# Patient Record
Sex: MALE | Race: WHITE | Employment: OTHER | URBAN - NONMETROPOLITAN AREA
[De-identification: names, ages, dates, MRNs, and addresses within clinical notes are randomized per-mention and may not be internally consistent; named-entity substitution may affect disease eponyms.]

---

## 2017-02-13 RX ORDER — FLECAINIDE ACETATE 100 MG/1
TABLET ORAL
Qty: 180 TABLET | Refills: 1 | Status: SHIPPED | OUTPATIENT
Start: 2017-02-13 | End: 2017-06-12 | Stop reason: SDUPTHER

## 2017-06-12 ENCOUNTER — OFFICE VISIT (OUTPATIENT)
Dept: CARDIOLOGY | Age: 82
End: 2017-06-12

## 2017-06-12 VITALS
BODY MASS INDEX: 25.2 KG/M2 | DIASTOLIC BLOOD PRESSURE: 74 MMHG | HEART RATE: 61 BPM | HEIGHT: 70 IN | SYSTOLIC BLOOD PRESSURE: 160 MMHG | WEIGHT: 176 LBS

## 2017-06-12 DIAGNOSIS — I48.0 PAROXYSMAL ATRIAL FIBRILLATION (HCC): Primary | ICD-10-CM

## 2017-06-12 DIAGNOSIS — I10 ESSENTIAL HYPERTENSION: ICD-10-CM

## 2017-06-12 PROCEDURE — 99213 OFFICE O/P EST LOW 20 MIN: CPT | Performed by: NUCLEAR MEDICINE

## 2017-06-12 PROCEDURE — 4040F PNEUMOC VAC/ADMIN/RCVD: CPT | Performed by: NUCLEAR MEDICINE

## 2017-06-12 PROCEDURE — G8428 CUR MEDS NOT DOCUMENT: HCPCS | Performed by: NUCLEAR MEDICINE

## 2017-06-12 PROCEDURE — G8598 ASA/ANTIPLAT THER USED: HCPCS | Performed by: NUCLEAR MEDICINE

## 2017-06-12 PROCEDURE — G8419 CALC BMI OUT NRM PARAM NOF/U: HCPCS | Performed by: NUCLEAR MEDICINE

## 2017-06-12 PROCEDURE — 1123F ACP DISCUSS/DSCN MKR DOCD: CPT | Performed by: NUCLEAR MEDICINE

## 2017-06-12 PROCEDURE — 93000 ELECTROCARDIOGRAM COMPLETE: CPT | Performed by: NUCLEAR MEDICINE

## 2017-06-12 PROCEDURE — 1036F TOBACCO NON-USER: CPT | Performed by: NUCLEAR MEDICINE

## 2017-06-12 RX ORDER — LEVOTHYROXINE SODIUM 0.03 MG/1
12.5 TABLET ORAL DAILY
COMMUNITY
End: 2019-08-07 | Stop reason: SDUPTHER

## 2017-06-12 RX ORDER — FLECAINIDE ACETATE 100 MG/1
TABLET ORAL
Qty: 180 TABLET | Refills: 3 | Status: SHIPPED | OUTPATIENT
Start: 2017-06-12 | End: 2018-01-15 | Stop reason: SDUPTHER

## 2017-06-21 ENCOUNTER — OFFICE VISIT (OUTPATIENT)
Age: 82
End: 2017-06-21

## 2017-06-21 VITALS
OXYGEN SATURATION: 96 % | DIASTOLIC BLOOD PRESSURE: 80 MMHG | BODY MASS INDEX: 25.04 KG/M2 | HEART RATE: 51 BPM | SYSTOLIC BLOOD PRESSURE: 140 MMHG | WEIGHT: 174.9 LBS | TEMPERATURE: 97.7 F | RESPIRATION RATE: 16 BRPM | HEIGHT: 70 IN

## 2017-06-21 DIAGNOSIS — L72.3 SEBACEOUS CYST: Primary | ICD-10-CM

## 2017-06-21 PROCEDURE — 99999 PR OFFICE/OUTPT VISIT,PROCEDURE ONLY: CPT | Performed by: SURGERY

## 2017-06-21 PROCEDURE — 1036F TOBACCO NON-USER: CPT | Performed by: SURGERY

## 2017-06-21 PROCEDURE — 12051 INTMD RPR FACE/MM 2.5 CM/<: CPT | Performed by: SURGERY

## 2017-06-21 PROCEDURE — 11441 EXC FACE-MM B9+MARG 0.6-1 CM: CPT | Performed by: SURGERY

## 2017-06-21 ASSESSMENT — ENCOUNTER SYMPTOMS
FACIAL SWELLING: 0
SHORTNESS OF BREATH: 0
DIARRHEA: 0
EYE PAIN: 0
EYE REDNESS: 0
SINUS PRESSURE: 0
SORE THROAT: 0
COLOR CHANGE: 0
APNEA: 0
CONSTIPATION: 0
VOMITING: 0
CHEST TIGHTNESS: 0
CHOKING: 0
COUGH: 0
ABDOMINAL DISTENTION: 0
ABDOMINAL PAIN: 0
WHEEZING: 0
RHINORRHEA: 0
EYE ITCHING: 0
BACK PAIN: 0
STRIDOR: 0
EYE DISCHARGE: 0
PHOTOPHOBIA: 0
RECTAL PAIN: 0
NAUSEA: 0
BLOOD IN STOOL: 0
TROUBLE SWALLOWING: 0
ANAL BLEEDING: 0
VOICE CHANGE: 0

## 2017-08-30 ENCOUNTER — HOSPITAL ENCOUNTER (OUTPATIENT)
Age: 82
Discharge: HOME | End: 2017-08-30
Payer: MEDICARE

## 2017-08-30 ENCOUNTER — HOSPITAL ENCOUNTER (OUTPATIENT)
Dept: GENERAL RADIOLOGY | Age: 82
Discharge: HOME | End: 2017-08-30
Payer: MEDICARE

## 2017-08-30 DIAGNOSIS — R05.9 COUGH: ICD-10-CM

## 2017-08-30 PROCEDURE — 71020 XR CHEST STANDARD TWO VW: CPT

## 2017-09-01 ENCOUNTER — HOSPITAL ENCOUNTER (OUTPATIENT)
Age: 82
Discharge: HOME | End: 2017-09-01
Payer: MEDICARE

## 2017-09-01 ENCOUNTER — HOSPITAL ENCOUNTER (OUTPATIENT)
Dept: CT IMAGING | Age: 82
Discharge: HOME | End: 2017-09-01
Payer: MEDICARE

## 2017-09-01 DIAGNOSIS — R91.8 LUNG MASS: ICD-10-CM

## 2017-09-01 LAB
CREATININE, WHOLE BLOOD: 1 MG/DL (ref 0.5–1.2)
ESTIMATED GFR, PCACC: 76 ML/MIN/1.73M2

## 2017-09-01 PROCEDURE — 71260 CT THORAX DX C+: CPT

## 2017-09-01 PROCEDURE — 6360000004 HC RX CONTRAST MEDICATION: Performed by: FAMILY MEDICINE

## 2017-09-01 PROCEDURE — 82565 ASSAY OF CREATININE: CPT

## 2017-09-01 RX ADMIN — IOPAMIDOL 100 ML: 755 INJECTION, SOLUTION INTRAVENOUS at 08:30

## 2017-09-09 ENCOUNTER — APPOINTMENT (OUTPATIENT)
Dept: GENERAL RADIOLOGY | Age: 82
DRG: 305 | End: 2017-09-09
Payer: MEDICARE

## 2017-09-09 ENCOUNTER — HOSPITAL ENCOUNTER (INPATIENT)
Age: 82
LOS: 1 days | Discharge: HOME | DRG: 305 | End: 2017-09-11
Attending: EMERGENCY MEDICINE | Admitting: INTERNAL MEDICINE
Payer: MEDICARE

## 2017-09-09 DIAGNOSIS — M54.50 ACUTE MIDLINE LOW BACK PAIN WITHOUT SCIATICA: ICD-10-CM

## 2017-09-09 DIAGNOSIS — I16.1 HYPERTENSIVE EMERGENCY: Primary | ICD-10-CM

## 2017-09-09 DIAGNOSIS — Z79.01 ANTICOAGULATED: ICD-10-CM

## 2017-09-09 DIAGNOSIS — R42 DIZZINESS: ICD-10-CM

## 2017-09-09 PROCEDURE — 84484 ASSAY OF TROPONIN QUANT: CPT

## 2017-09-09 PROCEDURE — 80053 COMPREHEN METABOLIC PANEL: CPT

## 2017-09-09 PROCEDURE — 99285 EMERGENCY DEPT VISIT HI MDM: CPT

## 2017-09-09 PROCEDURE — 36415 COLL VENOUS BLD VENIPUNCTURE: CPT

## 2017-09-09 PROCEDURE — 83690 ASSAY OF LIPASE: CPT

## 2017-09-09 PROCEDURE — 83880 ASSAY OF NATRIURETIC PEPTIDE: CPT

## 2017-09-09 PROCEDURE — 85610 PROTHROMBIN TIME: CPT

## 2017-09-09 PROCEDURE — 93005 ELECTROCARDIOGRAM TRACING: CPT

## 2017-09-09 PROCEDURE — 85025 COMPLETE CBC W/AUTO DIFF WBC: CPT

## 2017-09-09 PROCEDURE — 82248 BILIRUBIN DIRECT: CPT

## 2017-09-09 PROCEDURE — 71010 XR CHEST PORTABLE: CPT

## 2017-09-09 RX ORDER — ONDANSETRON 2 MG/ML
4 INJECTION INTRAMUSCULAR; INTRAVENOUS EVERY 30 MIN PRN
Status: DISCONTINUED | OUTPATIENT
Start: 2017-09-09 | End: 2017-09-11 | Stop reason: HOSPADM

## 2017-09-09 RX ORDER — SODIUM CHLORIDE 9 MG/ML
INJECTION, SOLUTION INTRAVENOUS CONTINUOUS
Status: DISCONTINUED | OUTPATIENT
Start: 2017-09-10 | End: 2017-09-11

## 2017-09-10 ENCOUNTER — APPOINTMENT (OUTPATIENT)
Dept: CT IMAGING | Age: 82
DRG: 305 | End: 2017-09-10
Payer: MEDICARE

## 2017-09-10 LAB
ALBUMIN SERPL-MCNC: 4 G/DL (ref 3.5–5.1)
ALP BLD-CCNC: 78 U/L (ref 38–126)
ALT SERPL-CCNC: 16 U/L (ref 11–66)
ANION GAP SERPL CALCULATED.3IONS-SCNC: 11 MEQ/L (ref 8–16)
AST SERPL-CCNC: 16 U/L (ref 5–40)
BASOPHILS # BLD: 0.3 %
BASOPHILS ABSOLUTE: 0 THOU/MM3 (ref 0–0.1)
BILIRUB SERPL-MCNC: < 0.2 MG/DL (ref 0.3–1.2)
BILIRUBIN DIRECT: < 0.2 MG/DL (ref 0–0.3)
BILIRUBIN URINE: NEGATIVE
BLOOD, URINE: NEGATIVE
BUN BLDV-MCNC: 22 MG/DL (ref 7–22)
CALCIUM SERPL-MCNC: 9.2 MG/DL (ref 8.5–10.5)
CHARACTER, URINE: CLEAR
CHLORIDE BLD-SCNC: 105 MEQ/L (ref 98–111)
CO2: 27 MEQ/L (ref 23–33)
COLOR: YELLOW
CREAT SERPL-MCNC: 0.8 MG/DL (ref 0.4–1.2)
EOSINOPHIL # BLD: 1.2 %
EOSINOPHILS ABSOLUTE: 0.1 THOU/MM3 (ref 0–0.4)
GFR SERPL CREATININE-BSD FRML MDRD: > 90 ML/MIN/1.73M2
GLUCOSE BLD-MCNC: 122 MG/DL (ref 70–108)
GLUCOSE URINE: NEGATIVE MG/DL
HCT VFR BLD CALC: 41.6 % (ref 42–52)
HEMOGLOBIN: 14.1 GM/DL (ref 14–18)
INR BLD: 0.92 (ref 0.85–1.13)
KETONES, URINE: NEGATIVE
LEUKOCYTE ESTERASE, URINE: NEGATIVE
LIPASE: 46.7 U/L (ref 5.6–51.3)
LIPASE: 76.7 U/L (ref 5.6–51.3)
LYMPHOCYTES # BLD: 17.9 %
LYMPHOCYTES ABSOLUTE: 1.9 THOU/MM3 (ref 1–4.8)
MACROCYTES: ABNORMAL
MCH RBC QN AUTO: 33.7 PG (ref 27–31)
MCHC RBC AUTO-ENTMCNC: 33.9 GM/DL (ref 33–37)
MCV RBC AUTO: 99.3 FL (ref 80–94)
MONOCYTES # BLD: 6.7 %
MONOCYTES ABSOLUTE: 0.7 THOU/MM3 (ref 0.4–1.3)
NITRITE, URINE: NEGATIVE
NUCLEATED RED BLOOD CELLS: 0 /100 WBC
OSMOLALITY CALCULATION: 289.6 MOSMOL/KG (ref 275–300)
PDW BLD-RTO: 12.2 % (ref 11.5–14.5)
PH UA: 7
PLATELET # BLD: 222 THOU/MM3 (ref 130–400)
PMV BLD AUTO: 7.9 MCM (ref 7.4–10.4)
POTASSIUM SERPL-SCNC: 3.9 MEQ/L (ref 3.5–5.2)
PRO-BNP: 69.1 PG/ML (ref 0–1800)
PROTEIN UA: NEGATIVE
RBC # BLD: 4.19 MILL/MM3 (ref 4.7–6.1)
RBC # BLD: NORMAL 10*6/UL
SEG NEUTROPHILS: 73.9 %
SEGMENTED NEUTROPHILS ABSOLUTE COUNT: 7.7 THOU/MM3 (ref 1.8–7.7)
SODIUM BLD-SCNC: 143 MEQ/L (ref 135–145)
SPECIFIC GRAVITY, URINE: 1.01 (ref 1–1.03)
TOTAL PROTEIN: 6.3 G/DL (ref 6.1–8)
TROPONIN T: < 0.01 NG/ML
TROPONIN T: < 0.01 NG/ML
UROBILINOGEN, URINE: 0.2 EU/DL
WBC # BLD: 10.4 THOU/MM3 (ref 4.8–10.8)

## 2017-09-10 PROCEDURE — 71275 CT ANGIOGRAPHY CHEST: CPT

## 2017-09-10 PROCEDURE — 2580000003 HC RX 258: Performed by: EMERGENCY MEDICINE

## 2017-09-10 PROCEDURE — 36415 COLL VENOUS BLD VENIPUNCTURE: CPT

## 2017-09-10 PROCEDURE — 6370000000 HC RX 637 (ALT 250 FOR IP): Performed by: INTERNAL MEDICINE

## 2017-09-10 PROCEDURE — 2500000003 HC RX 250 WO HCPCS: Performed by: EMERGENCY MEDICINE

## 2017-09-10 PROCEDURE — 2580000003 HC RX 258: Performed by: INTERNAL MEDICINE

## 2017-09-10 PROCEDURE — 81003 URINALYSIS AUTO W/O SCOPE: CPT

## 2017-09-10 PROCEDURE — 2060000000 HC ICU INTERMEDIATE R&B

## 2017-09-10 PROCEDURE — 6360000004 HC RX CONTRAST MEDICATION: Performed by: EMERGENCY MEDICINE

## 2017-09-10 PROCEDURE — 84484 ASSAY OF TROPONIN QUANT: CPT

## 2017-09-10 PROCEDURE — 74174 CTA ABD&PLVS W/CONTRAST: CPT

## 2017-09-10 PROCEDURE — 83690 ASSAY OF LIPASE: CPT

## 2017-09-10 PROCEDURE — 70450 CT HEAD/BRAIN W/O DYE: CPT

## 2017-09-10 RX ORDER — SODIUM CHLORIDE 0.9 % (FLUSH) 0.9 %
10 SYRINGE (ML) INJECTION PRN
Status: DISCONTINUED | OUTPATIENT
Start: 2017-09-10 | End: 2017-09-11 | Stop reason: HOSPADM

## 2017-09-10 RX ORDER — HYDROCODONE BITARTRATE AND ACETAMINOPHEN 5; 325 MG/1; MG/1
2 TABLET ORAL EVERY 4 HOURS PRN
Status: DISCONTINUED | OUTPATIENT
Start: 2017-09-10 | End: 2017-09-11 | Stop reason: HOSPADM

## 2017-09-10 RX ORDER — SODIUM CHLORIDE 0.9 % (FLUSH) 0.9 %
10 SYRINGE (ML) INJECTION EVERY 12 HOURS SCHEDULED
Status: DISCONTINUED | OUTPATIENT
Start: 2017-09-10 | End: 2017-09-11 | Stop reason: HOSPADM

## 2017-09-10 RX ORDER — FLECAINIDE ACETATE 50 MG/1
100 TABLET ORAL 2 TIMES DAILY
Status: DISCONTINUED | OUTPATIENT
Start: 2017-09-10 | End: 2017-09-11 | Stop reason: HOSPADM

## 2017-09-10 RX ORDER — ASPIRIN 81 MG/1
81 TABLET ORAL DAILY
Status: DISCONTINUED | OUTPATIENT
Start: 2017-09-10 | End: 2017-09-11 | Stop reason: HOSPADM

## 2017-09-10 RX ORDER — LEVOTHYROXINE SODIUM 0.03 MG/1
12.5 TABLET ORAL DAILY
Status: DISCONTINUED | OUTPATIENT
Start: 2017-09-10 | End: 2017-09-11 | Stop reason: HOSPADM

## 2017-09-10 RX ORDER — MULTIVITAMIN WITH FOLIC ACID 400 MCG
1 TABLET ORAL DAILY
Status: DISCONTINUED | OUTPATIENT
Start: 2017-09-10 | End: 2017-09-11 | Stop reason: HOSPADM

## 2017-09-10 RX ORDER — ACETAMINOPHEN 325 MG/1
650 TABLET ORAL EVERY 4 HOURS PRN
Status: DISCONTINUED | OUTPATIENT
Start: 2017-09-10 | End: 2017-09-11 | Stop reason: HOSPADM

## 2017-09-10 RX ORDER — HYDROCODONE BITARTRATE AND ACETAMINOPHEN 5; 325 MG/1; MG/1
1 TABLET ORAL EVERY 4 HOURS PRN
Status: DISCONTINUED | OUTPATIENT
Start: 2017-09-10 | End: 2017-09-11 | Stop reason: HOSPADM

## 2017-09-10 RX ADMIN — FLECAINIDE ACETATE 100 MG: 50 TABLET ORAL at 08:44

## 2017-09-10 RX ADMIN — FLECAINIDE ACETATE 100 MG: 50 TABLET ORAL at 17:35

## 2017-09-10 RX ADMIN — ASPIRIN 81 MG: 81 TABLET ORAL at 08:44

## 2017-09-10 RX ADMIN — IOPAMIDOL 85 ML: 755 INJECTION, SOLUTION INTRAVENOUS at 00:41

## 2017-09-10 RX ADMIN — NICARDIPINE HYDROCHLORIDE 5 MG/HR: 2.5 INJECTION INTRAVENOUS at 06:46

## 2017-09-10 RX ADMIN — Medication 10 ML: at 21:31

## 2017-09-10 RX ADMIN — SODIUM CHLORIDE: 900 INJECTION, SOLUTION INTRAVENOUS at 01:08

## 2017-09-10 RX ADMIN — RIVAROXABAN 20 MG: 20 TABLET, FILM COATED ORAL at 08:44

## 2017-09-10 RX ADMIN — LEVOTHYROXINE SODIUM 12.5 MCG: 25 TABLET ORAL at 06:43

## 2017-09-10 RX ADMIN — NICARDIPINE HYDROCHLORIDE 5 MG/HR: 2.5 INJECTION INTRAVENOUS at 01:08

## 2017-09-10 RX ADMIN — THERA TABS 1 TABLET: TAB at 08:44

## 2017-09-10 ASSESSMENT — ENCOUNTER SYMPTOMS
COUGH: 0
WHEEZING: 0
SORE THROAT: 0
BACK PAIN: 1
BLOOD IN STOOL: 0
VOMITING: 0
SHORTNESS OF BREATH: 0
NAUSEA: 0
ABDOMINAL PAIN: 0
DIARRHEA: 0

## 2017-09-10 ASSESSMENT — PAIN SCALES - GENERAL
PAINLEVEL_OUTOF10: 0
PAINLEVEL_OUTOF10: 0

## 2017-09-11 ENCOUNTER — TELEPHONE (OUTPATIENT)
Dept: CARDIOLOGY CLINIC | Age: 82
End: 2017-09-11

## 2017-09-11 ENCOUNTER — APPOINTMENT (OUTPATIENT)
Dept: INTERVENTIONAL RADIOLOGY/VASCULAR | Age: 82
DRG: 305 | End: 2017-09-11
Payer: MEDICARE

## 2017-09-11 VITALS
TEMPERATURE: 98 F | RESPIRATION RATE: 16 BRPM | BODY MASS INDEX: 24.34 KG/M2 | WEIGHT: 170 LBS | DIASTOLIC BLOOD PRESSURE: 66 MMHG | OXYGEN SATURATION: 94 % | HEART RATE: 62 BPM | HEIGHT: 70 IN | SYSTOLIC BLOOD PRESSURE: 143 MMHG

## 2017-09-11 DIAGNOSIS — I48.20 CHRONIC ATRIAL FIBRILLATION (HCC): Primary | ICD-10-CM

## 2017-09-11 DIAGNOSIS — I10 ESSENTIAL HYPERTENSION: ICD-10-CM

## 2017-09-11 DIAGNOSIS — R42 DIZZINESS: ICD-10-CM

## 2017-09-11 LAB
EKG ATRIAL RATE: 267 BPM
EKG P AXIS: 12 DEGREES
EKG P-R INTERVAL: 182 MS
EKG Q-T INTERVAL: 408 MS
EKG QRS DURATION: 98 MS
EKG QTC CALCULATION (BAZETT): 437 MS
EKG R AXIS: -21 DEGREES
EKG T AXIS: 92 DEGREES
EKG VENTRICULAR RATE: 69 BPM
PROSTATE SPECIFIC ANTIGEN: 1.17 NG/ML (ref 0–1)

## 2017-09-11 PROCEDURE — 84153 ASSAY OF PSA TOTAL: CPT

## 2017-09-11 PROCEDURE — 2580000003 HC RX 258: Performed by: INTERNAL MEDICINE

## 2017-09-11 PROCEDURE — 36415 COLL VENOUS BLD VENIPUNCTURE: CPT

## 2017-09-11 PROCEDURE — 6370000000 HC RX 637 (ALT 250 FOR IP): Performed by: INTERNAL MEDICINE

## 2017-09-11 PROCEDURE — A6257 TRANSPARENT FILM <= 16 SQ IN: HCPCS

## 2017-09-11 PROCEDURE — 93880 EXTRACRANIAL BILAT STUDY: CPT

## 2017-09-11 RX ORDER — ACETAMINOPHEN 325 MG/1
650 TABLET ORAL EVERY 4 HOURS PRN
Qty: 120 TABLET | Refills: 3 | Status: SHIPPED | OUTPATIENT
Start: 2017-09-11 | End: 2017-09-12

## 2017-09-11 RX ORDER — AMLODIPINE BESYLATE 2.5 MG/1
2.5 TABLET ORAL DAILY
Status: DISCONTINUED | OUTPATIENT
Start: 2017-09-11 | End: 2017-09-11 | Stop reason: HOSPADM

## 2017-09-11 RX ORDER — AMLODIPINE BESYLATE 2.5 MG/1
2.5 TABLET ORAL DAILY
Qty: 30 TABLET | Refills: 3 | Status: SHIPPED | OUTPATIENT
Start: 2017-09-11 | End: 2018-01-15 | Stop reason: SDUPTHER

## 2017-09-11 RX ADMIN — AMLODIPINE BESYLATE 2.5 MG: 2.5 TABLET ORAL at 13:44

## 2017-09-11 RX ADMIN — ASPIRIN 81 MG: 81 TABLET ORAL at 08:30

## 2017-09-11 RX ADMIN — LEVOTHYROXINE SODIUM 12.5 MCG: 25 TABLET ORAL at 06:49

## 2017-09-11 RX ADMIN — RIVAROXABAN 20 MG: 20 TABLET, FILM COATED ORAL at 08:29

## 2017-09-11 RX ADMIN — Medication 10 ML: at 08:30

## 2017-09-11 RX ADMIN — FLECAINIDE ACETATE 100 MG: 50 TABLET ORAL at 08:29

## 2017-09-11 RX ADMIN — THERA TABS 1 TABLET: TAB at 08:29

## 2017-09-12 ENCOUNTER — INITIAL CONSULT (OUTPATIENT)
Dept: NEUROLOGY | Age: 82
End: 2017-09-12
Payer: MEDICARE

## 2017-09-12 VITALS
HEIGHT: 70 IN | WEIGHT: 174 LBS | DIASTOLIC BLOOD PRESSURE: 82 MMHG | BODY MASS INDEX: 24.91 KG/M2 | HEART RATE: 62 BPM | SYSTOLIC BLOOD PRESSURE: 138 MMHG

## 2017-09-12 VITALS — HEIGHT: 70 IN | BODY MASS INDEX: 24.91 KG/M2 | WEIGHT: 174 LBS

## 2017-09-12 DIAGNOSIS — R42 DIZZINESS: Primary | ICD-10-CM

## 2017-09-12 DIAGNOSIS — R26.89 BALANCE PROBLEM: ICD-10-CM

## 2017-09-12 PROCEDURE — 1111F DSCHRG MED/CURRENT MED MERGE: CPT | Performed by: PSYCHIATRY & NEUROLOGY

## 2017-09-12 PROCEDURE — G8427 DOCREV CUR MEDS BY ELIG CLIN: HCPCS | Performed by: PSYCHIATRY & NEUROLOGY

## 2017-09-12 PROCEDURE — G8420 CALC BMI NORM PARAMETERS: HCPCS | Performed by: PSYCHIATRY & NEUROLOGY

## 2017-09-12 PROCEDURE — 99215 OFFICE O/P EST HI 40 MIN: CPT | Performed by: PSYCHIATRY & NEUROLOGY

## 2017-09-12 PROCEDURE — G8598 ASA/ANTIPLAT THER USED: HCPCS | Performed by: PSYCHIATRY & NEUROLOGY

## 2017-09-12 PROCEDURE — 1123F ACP DISCUSS/DSCN MKR DOCD: CPT | Performed by: PSYCHIATRY & NEUROLOGY

## 2017-09-12 PROCEDURE — 4040F PNEUMOC VAC/ADMIN/RCVD: CPT | Performed by: PSYCHIATRY & NEUROLOGY

## 2017-09-12 PROCEDURE — 1036F TOBACCO NON-USER: CPT | Performed by: PSYCHIATRY & NEUROLOGY

## 2017-09-13 PROBLEM — I71.20 THORACIC AORTIC ANEURYSM WITHOUT RUPTURE: Status: ACTIVE | Noted: 2017-09-13

## 2017-09-13 PROBLEM — R42 DIZZINESS: Status: ACTIVE | Noted: 2017-09-13

## 2017-09-15 ENCOUNTER — HOSPITAL ENCOUNTER (OUTPATIENT)
Dept: INPATIENT UNIT | Age: 82
Discharge: HOME | End: 2017-09-15

## 2017-09-15 ENCOUNTER — HOSPITAL ENCOUNTER (OUTPATIENT)
Dept: INPATIENT UNIT | Age: 82
Discharge: HOME | End: 2017-09-15
Attending: NUCLEAR MEDICINE | Admitting: NUCLEAR MEDICINE
Payer: MEDICARE

## 2017-09-15 VITALS
HEART RATE: 59 BPM | RESPIRATION RATE: 16 BRPM | SYSTOLIC BLOOD PRESSURE: 142 MMHG | OXYGEN SATURATION: 97 % | TEMPERATURE: 97.7 F | DIASTOLIC BLOOD PRESSURE: 64 MMHG

## 2017-09-15 LAB
ABO CHECK: NORMAL
ANION GAP SERPL CALCULATED.3IONS-SCNC: 14 MEQ/L (ref 8–16)
BUN BLDV-MCNC: 13 MG/DL (ref 7–22)
CALCIUM SERPL-MCNC: 9 MG/DL (ref 8.5–10.5)
CHLORIDE BLD-SCNC: 100 MEQ/L (ref 98–111)
CHOLESTEROL, TOTAL: 160 MG/DL (ref 100–199)
CO2: 28 MEQ/L (ref 23–33)
CREAT SERPL-MCNC: 0.8 MG/DL (ref 0.4–1.2)
GEL INDIRECT COOMBS: NORMAL
GFR SERPL CREATININE-BSD FRML MDRD: > 90 ML/MIN/1.73M2
GLUCOSE BLD-MCNC: 97 MG/DL (ref 70–108)
HCT VFR BLD CALC: 42.8 % (ref 42–52)
HDLC SERPL-MCNC: 48 MG/DL
HEMOGLOBIN: 14.4 GM/DL (ref 14–18)
LDL CHOLESTEROL CALCULATED: 87 MG/DL
MCH RBC QN AUTO: 33.5 PG (ref 27–31)
MCHC RBC AUTO-ENTMCNC: 33.6 GM/DL (ref 33–37)
MCV RBC AUTO: 99.7 FL (ref 80–94)
PDW BLD-RTO: 12.5 % (ref 11.5–14.5)
PLATELET # BLD: 207 THOU/MM3 (ref 130–400)
PMV BLD AUTO: 7.9 MCM (ref 7.4–10.4)
POTASSIUM SERPL-SCNC: 4.5 MEQ/L (ref 3.5–5.2)
RBC # BLD: 4.29 MILL/MM3 (ref 4.7–6.1)
RH FACTOR: NORMAL
SODIUM BLD-SCNC: 142 MEQ/L (ref 135–145)
TRIGL SERPL-MCNC: 127 MG/DL (ref 0–199)
WBC # BLD: 6.7 THOU/MM3 (ref 4.8–10.8)

## 2017-09-15 PROCEDURE — 36415 COLL VENOUS BLD VENIPUNCTURE: CPT

## 2017-09-15 PROCEDURE — 86901 BLOOD TYPING SEROLOGIC RH(D): CPT

## 2017-09-15 PROCEDURE — 80061 LIPID PANEL: CPT

## 2017-09-15 PROCEDURE — C1894 INTRO/SHEATH, NON-LASER: HCPCS

## 2017-09-15 PROCEDURE — C1769 GUIDE WIRE: HCPCS

## 2017-09-15 PROCEDURE — A6258 TRANSPARENT FILM >16<=48 IN: HCPCS

## 2017-09-15 PROCEDURE — 2580000003 HC RX 258: Performed by: NUCLEAR MEDICINE

## 2017-09-15 PROCEDURE — 80048 BASIC METABOLIC PNL TOTAL CA: CPT

## 2017-09-15 PROCEDURE — 93458 L HRT ARTERY/VENTRICLE ANGIO: CPT | Performed by: NUCLEAR MEDICINE

## 2017-09-15 PROCEDURE — 2720000010 HC SURG SUPPLY STERILE

## 2017-09-15 PROCEDURE — 2500000003 HC RX 250 WO HCPCS

## 2017-09-15 PROCEDURE — 2780000010 HC IMPLANT OTHER

## 2017-09-15 PROCEDURE — 85027 COMPLETE CBC AUTOMATED: CPT

## 2017-09-15 PROCEDURE — 93005 ELECTROCARDIOGRAM TRACING: CPT

## 2017-09-15 PROCEDURE — 86885 COOMBS TEST INDIRECT QUAL: CPT

## 2017-09-15 PROCEDURE — 6360000002 HC RX W HCPCS

## 2017-09-15 PROCEDURE — 86900 BLOOD TYPING SEROLOGIC ABO: CPT

## 2017-09-15 RX ORDER — ACETAMINOPHEN 325 MG/1
650 TABLET ORAL EVERY 4 HOURS PRN
Status: DISCONTINUED | OUTPATIENT
Start: 2017-09-15 | End: 2017-09-15 | Stop reason: HOSPADM

## 2017-09-15 RX ORDER — SODIUM CHLORIDE 0.9 % (FLUSH) 0.9 %
10 SYRINGE (ML) INJECTION EVERY 12 HOURS SCHEDULED
Status: DISCONTINUED | OUTPATIENT
Start: 2017-09-15 | End: 2017-09-15 | Stop reason: HOSPADM

## 2017-09-15 RX ORDER — ATROPINE SULFATE 0.4 MG/ML
0.5 AMPUL (ML) INJECTION
Status: DISCONTINUED | OUTPATIENT
Start: 2017-09-15 | End: 2017-09-15 | Stop reason: HOSPADM

## 2017-09-15 RX ORDER — SODIUM CHLORIDE 9 MG/ML
INJECTION, SOLUTION INTRAVENOUS CONTINUOUS
Status: DISCONTINUED | OUTPATIENT
Start: 2017-09-15 | End: 2017-09-15 | Stop reason: SDUPTHER

## 2017-09-15 RX ORDER — ATORVASTATIN CALCIUM 10 MG/1
10 TABLET, FILM COATED ORAL DAILY
Qty: 30 TABLET | Refills: 3 | Status: SHIPPED | OUTPATIENT
Start: 2017-09-15 | End: 2018-01-15 | Stop reason: SDUPTHER

## 2017-09-15 RX ORDER — SODIUM CHLORIDE 9 MG/ML
INJECTION, SOLUTION INTRAVENOUS CONTINUOUS
Status: DISCONTINUED | OUTPATIENT
Start: 2017-09-15 | End: 2017-09-15 | Stop reason: HOSPADM

## 2017-09-15 RX ORDER — ONDANSETRON 2 MG/ML
4 INJECTION INTRAMUSCULAR; INTRAVENOUS EVERY 6 HOURS PRN
Status: DISCONTINUED | OUTPATIENT
Start: 2017-09-15 | End: 2017-09-15 | Stop reason: HOSPADM

## 2017-09-15 RX ORDER — SODIUM CHLORIDE 0.9 % (FLUSH) 0.9 %
10 SYRINGE (ML) INJECTION PRN
Status: DISCONTINUED | OUTPATIENT
Start: 2017-09-15 | End: 2017-09-15 | Stop reason: HOSPADM

## 2017-09-15 RX ORDER — SODIUM CHLORIDE 0.9 % (FLUSH) 0.9 %
10 SYRINGE (ML) INJECTION PRN
Status: DISCONTINUED | OUTPATIENT
Start: 2017-09-15 | End: 2017-09-15 | Stop reason: SDUPTHER

## 2017-09-15 RX ORDER — SODIUM CHLORIDE 0.9 % (FLUSH) 0.9 %
10 SYRINGE (ML) INJECTION EVERY 12 HOURS SCHEDULED
Status: DISCONTINUED | OUTPATIENT
Start: 2017-09-15 | End: 2017-09-15 | Stop reason: SDUPTHER

## 2017-09-15 RX ADMIN — SODIUM CHLORIDE: 9 INJECTION, SOLUTION INTRAVENOUS at 11:05

## 2017-09-18 ENCOUNTER — HOSPITAL ENCOUNTER (OUTPATIENT)
Dept: MRI IMAGING | Age: 82
Discharge: HOME | End: 2017-09-18
Payer: MEDICARE

## 2017-09-18 DIAGNOSIS — R26.89 BALANCE PROBLEM: ICD-10-CM

## 2017-09-18 DIAGNOSIS — R42 DIZZINESS: ICD-10-CM

## 2017-09-18 LAB
EKG ATRIAL RATE: 51 BPM
EKG P AXIS: 0 DEGREES
EKG P-R INTERVAL: 232 MS
EKG Q-T INTERVAL: 430 MS
EKG QRS DURATION: 100 MS
EKG QTC CALCULATION (BAZETT): 396 MS
EKG R AXIS: -3 DEGREES
EKG T AXIS: 79 DEGREES
EKG VENTRICULAR RATE: 51 BPM

## 2017-09-18 PROCEDURE — 70544 MR ANGIOGRAPHY HEAD W/O DYE: CPT

## 2017-09-18 PROCEDURE — 70551 MRI BRAIN STEM W/O DYE: CPT

## 2017-09-22 ENCOUNTER — HOSPITAL ENCOUNTER (OUTPATIENT)
Dept: PULMONOLOGY | Age: 82
Discharge: HOME | End: 2017-09-22
Payer: MEDICARE

## 2017-09-22 PROCEDURE — 94729 DIFFUSING CAPACITY: CPT

## 2017-09-22 PROCEDURE — 94726 PLETHYSMOGRAPHY LUNG VOLUMES: CPT

## 2017-09-22 PROCEDURE — 94060 EVALUATION OF WHEEZING: CPT

## 2017-09-27 ENCOUNTER — OFFICE VISIT (OUTPATIENT)
Dept: NEUROLOGY | Age: 82
End: 2017-09-27
Payer: MEDICARE

## 2017-09-27 VITALS
BODY MASS INDEX: 24.9 KG/M2 | DIASTOLIC BLOOD PRESSURE: 80 MMHG | WEIGHT: 173.94 LBS | SYSTOLIC BLOOD PRESSURE: 130 MMHG | HEART RATE: 67 BPM | HEIGHT: 70 IN

## 2017-09-27 DIAGNOSIS — R42 DIZZINESS: Primary | ICD-10-CM

## 2017-09-27 PROCEDURE — 4040F PNEUMOC VAC/ADMIN/RCVD: CPT | Performed by: PSYCHIATRY & NEUROLOGY

## 2017-09-27 PROCEDURE — 1111F DSCHRG MED/CURRENT MED MERGE: CPT | Performed by: PSYCHIATRY & NEUROLOGY

## 2017-09-27 PROCEDURE — 1123F ACP DISCUSS/DSCN MKR DOCD: CPT | Performed by: PSYCHIATRY & NEUROLOGY

## 2017-09-27 PROCEDURE — G8427 DOCREV CUR MEDS BY ELIG CLIN: HCPCS | Performed by: PSYCHIATRY & NEUROLOGY

## 2017-09-27 PROCEDURE — G8598 ASA/ANTIPLAT THER USED: HCPCS | Performed by: PSYCHIATRY & NEUROLOGY

## 2017-09-27 PROCEDURE — 99213 OFFICE O/P EST LOW 20 MIN: CPT | Performed by: PSYCHIATRY & NEUROLOGY

## 2017-09-27 PROCEDURE — G8420 CALC BMI NORM PARAMETERS: HCPCS | Performed by: PSYCHIATRY & NEUROLOGY

## 2017-09-27 PROCEDURE — 1036F TOBACCO NON-USER: CPT | Performed by: PSYCHIATRY & NEUROLOGY

## 2017-09-27 RX ORDER — FLUTICASONE PROPIONATE 110 UG/1
1 AEROSOL, METERED RESPIRATORY (INHALATION) 2 TIMES DAILY
COMMUNITY
End: 2018-07-25 | Stop reason: ALTCHOICE

## 2017-09-27 RX ORDER — MINOCYCLINE HYDROCHLORIDE 100 MG/1
100 CAPSULE ORAL 2 TIMES DAILY
COMMUNITY
End: 2018-07-25 | Stop reason: ALTCHOICE

## 2017-09-28 ENCOUNTER — PROCEDURE VISIT (OUTPATIENT)
Dept: ENT CLINIC | Age: 82
End: 2017-09-28
Payer: MEDICARE

## 2017-09-28 ENCOUNTER — OFFICE VISIT (OUTPATIENT)
Dept: ENT CLINIC | Age: 82
End: 2017-09-28
Payer: MEDICARE

## 2017-09-28 VITALS
SYSTOLIC BLOOD PRESSURE: 112 MMHG | HEART RATE: 60 BPM | BODY MASS INDEX: 24.37 KG/M2 | WEIGHT: 170.2 LBS | HEIGHT: 70 IN | RESPIRATION RATE: 12 BRPM | DIASTOLIC BLOOD PRESSURE: 64 MMHG | TEMPERATURE: 97.7 F

## 2017-09-28 DIAGNOSIS — Z79.01 LONG TERM CURRENT USE OF ANTICOAGULANT THERAPY: ICD-10-CM

## 2017-09-28 DIAGNOSIS — R05.3 CHRONIC COUGH: ICD-10-CM

## 2017-09-28 DIAGNOSIS — H74.8X1 HEMOTYMPANUM, RIGHT: Primary | ICD-10-CM

## 2017-09-28 DIAGNOSIS — R42 LIGHTHEADEDNESS: ICD-10-CM

## 2017-09-28 DIAGNOSIS — H74.8X2 HEMOTYMPANUM, LEFT: Primary | ICD-10-CM

## 2017-09-28 PROCEDURE — G8598 ASA/ANTIPLAT THER USED: HCPCS | Performed by: NURSE PRACTITIONER

## 2017-09-28 PROCEDURE — 99202 OFFICE O/P NEW SF 15 MIN: CPT | Performed by: NURSE PRACTITIONER

## 2017-09-28 PROCEDURE — 1036F TOBACCO NON-USER: CPT | Performed by: NURSE PRACTITIONER

## 2017-09-28 PROCEDURE — G8427 DOCREV CUR MEDS BY ELIG CLIN: HCPCS | Performed by: NURSE PRACTITIONER

## 2017-09-28 PROCEDURE — 4040F PNEUMOC VAC/ADMIN/RCVD: CPT | Performed by: NURSE PRACTITIONER

## 2017-09-28 PROCEDURE — 92504 EAR MICROSCOPY EXAMINATION: CPT | Performed by: OTOLARYNGOLOGY

## 2017-09-28 PROCEDURE — 1123F ACP DISCUSS/DSCN MKR DOCD: CPT | Performed by: NURSE PRACTITIONER

## 2017-09-28 PROCEDURE — G8420 CALC BMI NORM PARAMETERS: HCPCS | Performed by: NURSE PRACTITIONER

## 2017-09-28 PROCEDURE — 1111F DSCHRG MED/CURRENT MED MERGE: CPT | Performed by: NURSE PRACTITIONER

## 2017-09-28 ASSESSMENT — ENCOUNTER SYMPTOMS
STRIDOR: 0
SORE THROAT: 0
TROUBLE SWALLOWING: 0
VOICE CHANGE: 0
EYE PAIN: 0
WHEEZING: 0
VOMITING: 0
COLOR CHANGE: 0
SHORTNESS OF BREATH: 0
RECTAL PAIN: 0
CHEST TIGHTNESS: 0
ABDOMINAL DISTENTION: 0
BACK PAIN: 0
ANAL BLEEDING: 0
APNEA: 0
CONSTIPATION: 0
COUGH: 1
RHINORRHEA: 0
ABDOMINAL PAIN: 0
PHOTOPHOBIA: 0
NAUSEA: 0
DIARRHEA: 0
BLOOD IN STOOL: 0
EYE DISCHARGE: 0
FACIAL SWELLING: 0
SINUS PRESSURE: 0
EYE ITCHING: 0
CHOKING: 0
EYE REDNESS: 0

## 2017-10-03 ENCOUNTER — OFFICE VISIT (OUTPATIENT)
Dept: ENT CLINIC | Age: 82
End: 2017-10-03
Payer: MEDICARE

## 2017-10-03 VITALS
DIASTOLIC BLOOD PRESSURE: 62 MMHG | BODY MASS INDEX: 24.72 KG/M2 | TEMPERATURE: 97.7 F | WEIGHT: 172.3 LBS | RESPIRATION RATE: 16 BRPM | SYSTOLIC BLOOD PRESSURE: 120 MMHG | HEART RATE: 78 BPM

## 2017-10-03 DIAGNOSIS — R05.3 CHRONIC COUGH: Primary | ICD-10-CM

## 2017-10-03 DIAGNOSIS — H91.93 BILATERAL HEARING LOSS, UNSPECIFIED HEARING LOSS TYPE: ICD-10-CM

## 2017-10-03 DIAGNOSIS — R42 LIGHTHEADEDNESS: ICD-10-CM

## 2017-10-03 DIAGNOSIS — Z79.01 LONG TERM CURRENT USE OF ANTICOAGULANT THERAPY: ICD-10-CM

## 2017-10-03 PROCEDURE — 1123F ACP DISCUSS/DSCN MKR DOCD: CPT | Performed by: OTOLARYNGOLOGY

## 2017-10-03 PROCEDURE — 99213 OFFICE O/P EST LOW 20 MIN: CPT | Performed by: OTOLARYNGOLOGY

## 2017-10-03 PROCEDURE — 1036F TOBACCO NON-USER: CPT | Performed by: OTOLARYNGOLOGY

## 2017-10-03 PROCEDURE — G8420 CALC BMI NORM PARAMETERS: HCPCS | Performed by: OTOLARYNGOLOGY

## 2017-10-03 PROCEDURE — G8427 DOCREV CUR MEDS BY ELIG CLIN: HCPCS | Performed by: OTOLARYNGOLOGY

## 2017-10-03 PROCEDURE — 31575 DIAGNOSTIC LARYNGOSCOPY: CPT | Performed by: OTOLARYNGOLOGY

## 2017-10-03 PROCEDURE — 4040F PNEUMOC VAC/ADMIN/RCVD: CPT | Performed by: OTOLARYNGOLOGY

## 2017-10-03 PROCEDURE — 1111F DSCHRG MED/CURRENT MED MERGE: CPT | Performed by: OTOLARYNGOLOGY

## 2017-10-03 PROCEDURE — G8484 FLU IMMUNIZE NO ADMIN: HCPCS | Performed by: OTOLARYNGOLOGY

## 2017-10-03 PROCEDURE — G8598 ASA/ANTIPLAT THER USED: HCPCS | Performed by: OTOLARYNGOLOGY

## 2017-10-03 ASSESSMENT — ENCOUNTER SYMPTOMS
COLOR CHANGE: 0
WHEEZING: 0
RHINORRHEA: 0
CHEST TIGHTNESS: 0
SORE THROAT: 0
NAUSEA: 0
VOICE CHANGE: 0
SHORTNESS OF BREATH: 0
SINUS PRESSURE: 0
STRIDOR: 0
FACIAL SWELLING: 0
CHOKING: 0
VOMITING: 0
COUGH: 0
TROUBLE SWALLOWING: 0
APNEA: 0
ABDOMINAL PAIN: 0
DIARRHEA: 0

## 2017-10-03 NOTE — PATIENT INSTRUCTIONS
Do not fill up stomach for 3 hours before bedtime. Elevate the head of the bed, perhaps with a foam wedge. Do not clear your throat, just cough instead.

## 2017-10-03 NOTE — MR AVS SNAPSHOT
After Visit Summary             Adrian Pabon   10/3/2017 8:45 AM   Office Visit    Description:  Male : 1934   Provider:  Dorothy Uriarte MD   Department:  [facility]              Your Follow-Up and Future Appointments         Below is a list of your follow-up and future appointments. This may not be a complete list as you may have made appointments directly with providers that we are not aware of or your providers may have made some for you. Please call your providers to confirm appointments. It is important to keep your appointments. Please bring your current insurance card, photo ID, co-pay, and all medication bottles to your appointment. If self-pay, payment is expected at the time of service. Your To-Do List     Future Appointments Provider Department Dept Phone    2017 10:00 AM Dorothy Uriarte MD [facility] [phone]    Please arrive 15 minutes prior to appointment, bring photo ID and insurance card. Please arrive 15 minutes prior to appointment, bring photo ID and insurance card. 2018 7:45 AM Jonny Omer MD [facility] [phone]    Please arrive 15 minutes prior to appointment, bring photo ID and insurance card. Please arrive 15 minutes prior to appointment, bring photo ID and insurance card. 2018 9:40 AM Chantelle Bonillaa. [facility] 60 [phone]    Please arrive 15 minutes prior to scheduled appointment time to complete paper work, bring photo ID and insurance cards. Please arrive 15 minutes prior to scheduled appointment time to complete paper work, bring photo ID and insurance cards. 2018 11:00 AM Jonny Omer MD [facility] [phone]    Please arrive 15 minutes prior to appointment, bring photo ID and insurance card. Please arrive 15 minutes prior to appointment, bring photo ID and insurance card.          Information from Your Visit        Department Name Address Phone Fax    [facility] [address] [facility]  [phone]      You Were Seen for:         Comments    Chronic cough   [[id number]]         Vital Signs     Blood Pressure Pulse Temperature Respirations Weight Body Mass Index    120/62 (Site: Right Arm, Position: Sitting) 78 97.7 °F (36.5 °C) (Oral) 16 172 lb 4.8 oz (78.2 kg) 24.72 kg/m2    Smoking Status                   Never Smoker           Instructions    Do not fill up stomach for 3 hours before bedtime. Elevate the head of the bed, perhaps with a foam wedge. Do not clear your throat, just cough instead. Medications and Orders      Your Current Medications Are              fluticasone (FLOVENT HFA) 110 MCG/ACT inhaler Inhale 1 puff into the lungs 2 times daily    mometasone-formoterol (DULERA) 100-5 MCG/ACT inhaler Inhale 2 puffs into the lungs 2 times daily    atorvastatin (LIPITOR) 10 MG tablet Take 1 tablet by mouth daily    amLODIPine (NORVASC) 2.5 MG tablet Take 1 tablet by mouth daily    levothyroxine (SYNTHROID) 25 MCG tablet Take 12.5 mcg by mouth Daily    rivaroxaban (XARELTO) 20 MG TABS tablet Take 1 tablet by mouth daily (with breakfast) Lox [id number]  Exp 5-19    flecainide (TAMBOCOR) 100 MG tablet TAKE ONE TABLET BY MOUTH TWICE A DAY    aspirin 81 MG tablet Take 81 mg by mouth daily.     MULTIPLE VITAMIN PO Take by mouth daily     minocycline (MINOCIN;DYNACIN) 100 MG capsule Take 100 mg by mouth 2 times daily      Allergies           No Known Allergies         Additional Information        Basic Information     Date Of Birth Sex Race Ethnicity Preferred Language Preferred Written Language    1934 Male White Non-/Non  English English      Problem List as of 10/3/2017  Date Reviewed: 9/28/2017                Coronary artery disease of native artery of native heart with stable angina pectoris ([address]    Dizziness    Thoracic aortic aneurysm without rupture ([address]

## 2017-10-12 RX ORDER — AZITHROMYCIN 250 MG/1
TABLET, FILM COATED ORAL
Qty: 2 PACKET | Refills: 0 | Status: SHIPPED | OUTPATIENT
Start: 2017-10-12 | End: 2018-07-25 | Stop reason: ALTCHOICE

## 2018-01-15 ENCOUNTER — OFFICE VISIT (OUTPATIENT)
Dept: CARDIOLOGY CLINIC | Age: 83
End: 2018-01-15
Payer: MEDICARE

## 2018-01-15 VITALS
DIASTOLIC BLOOD PRESSURE: 80 MMHG | BODY MASS INDEX: 25.2 KG/M2 | HEART RATE: 61 BPM | HEIGHT: 70 IN | WEIGHT: 176 LBS | SYSTOLIC BLOOD PRESSURE: 142 MMHG

## 2018-01-15 DIAGNOSIS — I25.10 CORONARY ARTERY DISEASE INVOLVING NATIVE CORONARY ARTERY OF NATIVE HEART WITHOUT ANGINA PECTORIS: ICD-10-CM

## 2018-01-15 DIAGNOSIS — I48.20 CHRONIC ATRIAL FIBRILLATION (HCC): Primary | ICD-10-CM

## 2018-01-15 DIAGNOSIS — I10 ESSENTIAL HYPERTENSION: ICD-10-CM

## 2018-01-15 PROCEDURE — 4040F PNEUMOC VAC/ADMIN/RCVD: CPT | Performed by: NUCLEAR MEDICINE

## 2018-01-15 PROCEDURE — 93000 ELECTROCARDIOGRAM COMPLETE: CPT | Performed by: NUCLEAR MEDICINE

## 2018-01-15 PROCEDURE — 1036F TOBACCO NON-USER: CPT | Performed by: NUCLEAR MEDICINE

## 2018-01-15 PROCEDURE — G8428 CUR MEDS NOT DOCUMENT: HCPCS | Performed by: NUCLEAR MEDICINE

## 2018-01-15 PROCEDURE — G8598 ASA/ANTIPLAT THER USED: HCPCS | Performed by: NUCLEAR MEDICINE

## 2018-01-15 PROCEDURE — G8484 FLU IMMUNIZE NO ADMIN: HCPCS | Performed by: NUCLEAR MEDICINE

## 2018-01-15 PROCEDURE — 99214 OFFICE O/P EST MOD 30 MIN: CPT | Performed by: NUCLEAR MEDICINE

## 2018-01-15 PROCEDURE — G8419 CALC BMI OUT NRM PARAM NOF/U: HCPCS | Performed by: NUCLEAR MEDICINE

## 2018-01-15 PROCEDURE — 1123F ACP DISCUSS/DSCN MKR DOCD: CPT | Performed by: NUCLEAR MEDICINE

## 2018-01-15 RX ORDER — ATORVASTATIN CALCIUM 10 MG/1
10 TABLET, FILM COATED ORAL DAILY
Qty: 90 TABLET | Refills: 3 | Status: SHIPPED | OUTPATIENT
Start: 2018-01-15 | End: 2019-01-12 | Stop reason: SDUPTHER

## 2018-01-15 RX ORDER — FLECAINIDE ACETATE 100 MG/1
TABLET ORAL
Qty: 180 TABLET | Refills: 3 | Status: SHIPPED | OUTPATIENT
Start: 2018-01-15 | End: 2018-12-26 | Stop reason: SDUPTHER

## 2018-01-15 RX ORDER — AMLODIPINE BESYLATE 2.5 MG/1
2.5 TABLET ORAL DAILY
Qty: 90 TABLET | Refills: 3 | Status: SHIPPED | OUTPATIENT
Start: 2018-01-15 | End: 2019-01-12 | Stop reason: SDUPTHER

## 2018-01-15 NOTE — PROGRESS NOTES
[facility]  [facility]  [address]  [address]  [facility] [address]  Dept: [phone]  Dept Fax: [phone]  Loc: [phone]    Visit Date: 1/15/2018    Umang Snowden is a 80 y.o. male who presents today for:  Chief Complaint   Patient presents with    Follow Up After Procedure     cath    Atrial Fibrillation    Hypertension    Coronary Artery Disease     Intermittent A fib   Cath done   diffuse CAD  No chest pain  No changes in breathing  Active   No new issues   Some ear issues  Some bleeding  They want him off of the Xeralto   Some vertigo   HPI:  HPI  Past Medical History:   Diagnosis Date    Bronchitis     CAD (coronary artery disease)     atrial fib     Chronic kidney disease     kidney stones     Hypertension     Stroke (cerebrum) ([address]     Thyroid disease     Trigeminal neuralgia       Past Surgical History:   Procedure Laterality Date    APPENDECTOMY  1951    BRAIN SURGERY  2003    CHOLECYSTECTOMY      COLONOSCOPY      GALLBLADDER SURGERY  2010    HERNIA REPAIR      KIDNEY STONE SURGERY  2003    TURP  2003     Family History   Problem Relation Age of Onset    Heart Disease Mother     Heart Attack Mother     Diabetes Father     Heart Disease Father     High Blood Pressure Father     Cancer Brother     Diabetes Brother     Heart Disease Brother     Diabetes Brother     Stroke Brother      Social History   Substance Use Topics    Smoking status: Never Smoker    Smokeless tobacco: Never Used    Alcohol use No      Current Outpatient Prescriptions   Medication Sig Dispense Refill    atorvastatin (LIPITOR) 10 MG tablet Take 1 tablet by mouth daily 30 tablet 3    amLODIPine (NORVASC) 2.5 MG tablet Take 1 tablet by mouth daily 30 tablet 3    rivaroxaban (XARELTO) 20 MG TABS tablet Take 1 tablet by mouth daily (with breakfast) Lox [id number]  Exp 5-19 91 tablet 0    flecainide (TAMBOCOR) 100 MG tablet TAKE ONE TABLET BY MOUTH TWICE A

## 2018-01-15 NOTE — PROGRESS NOTES
Patient here for follow up to heart cath. Patient complains of occasional lightheadedness and sob on occasion. Patient denies chest pain.

## 2018-04-17 ENCOUNTER — HOSPITAL ENCOUNTER (OUTPATIENT)
Age: 83
Discharge: HOME | End: 2018-04-17
Payer: MEDICARE

## 2018-04-17 ENCOUNTER — HOSPITAL ENCOUNTER (OUTPATIENT)
Dept: CT IMAGING | Age: 83
Discharge: HOME | End: 2018-04-17
Payer: MEDICARE

## 2018-04-17 DIAGNOSIS — R10.84 ABDOMINAL PAIN, GENERALIZED: ICD-10-CM

## 2018-04-17 LAB
CREATININE, WHOLE BLOOD: 1 MG/DL (ref 0.5–1.2)
ESTIMATED GFR, PCACC: 76 ML/MIN/1.73M2

## 2018-04-17 PROCEDURE — 74177 CT ABD & PELVIS W/CONTRAST: CPT

## 2018-04-17 PROCEDURE — 82565 ASSAY OF CREATININE: CPT

## 2018-04-17 PROCEDURE — 6360000004 HC RX CONTRAST MEDICATION: Performed by: FAMILY MEDICINE

## 2018-04-17 RX ADMIN — IOPAMIDOL 100 ML: 755 INJECTION, SOLUTION INTRAVENOUS at 10:30

## 2018-04-17 RX ADMIN — IOHEXOL 50 ML: 240 INJECTION, SOLUTION INTRATHECAL; INTRAVASCULAR; INTRAVENOUS; ORAL at 09:00

## 2018-07-25 ENCOUNTER — OFFICE VISIT (OUTPATIENT)
Dept: CARDIOLOGY CLINIC | Age: 83
End: 2018-07-25
Payer: MEDICARE

## 2018-07-25 ENCOUNTER — OFFICE VISIT (OUTPATIENT)
Dept: SURGERY | Age: 83
End: 2018-07-25
Payer: MEDICARE

## 2018-07-25 VITALS
SYSTOLIC BLOOD PRESSURE: 146 MMHG | WEIGHT: 170 LBS | HEART RATE: 51 BPM | BODY MASS INDEX: 24.34 KG/M2 | DIASTOLIC BLOOD PRESSURE: 60 MMHG | HEIGHT: 70 IN

## 2018-07-25 VITALS
OXYGEN SATURATION: 95 % | TEMPERATURE: 96.6 F | HEART RATE: 60 BPM | HEIGHT: 70 IN | RESPIRATION RATE: 18 BRPM | WEIGHT: 171.1 LBS | DIASTOLIC BLOOD PRESSURE: 60 MMHG | SYSTOLIC BLOOD PRESSURE: 132 MMHG | BODY MASS INDEX: 24.5 KG/M2

## 2018-07-25 DIAGNOSIS — Z01.818 PRE-OP TESTING: ICD-10-CM

## 2018-07-25 DIAGNOSIS — I71.20 THORACIC AORTIC ANEURYSM WITHOUT RUPTURE: ICD-10-CM

## 2018-07-25 DIAGNOSIS — I48.20 CHRONIC ATRIAL FIBRILLATION (HCC): ICD-10-CM

## 2018-07-25 DIAGNOSIS — R42 DIZZINESS: ICD-10-CM

## 2018-07-25 DIAGNOSIS — I25.118 CORONARY ARTERY DISEASE OF NATIVE ARTERY OF NATIVE HEART WITH STABLE ANGINA PECTORIS (HCC): Primary | ICD-10-CM

## 2018-07-25 DIAGNOSIS — K40.90 RIGHT INGUINAL HERNIA: Primary | ICD-10-CM

## 2018-07-25 DIAGNOSIS — I25.118 CORONARY ARTERY DISEASE OF NATIVE ARTERY OF NATIVE HEART WITH STABLE ANGINA PECTORIS (HCC): ICD-10-CM

## 2018-07-25 PROCEDURE — G8427 DOCREV CUR MEDS BY ELIG CLIN: HCPCS | Performed by: SURGERY

## 2018-07-25 PROCEDURE — G8420 CALC BMI NORM PARAMETERS: HCPCS | Performed by: NUCLEAR MEDICINE

## 2018-07-25 PROCEDURE — G8420 CALC BMI NORM PARAMETERS: HCPCS | Performed by: SURGERY

## 2018-07-25 PROCEDURE — G8427 DOCREV CUR MEDS BY ELIG CLIN: HCPCS | Performed by: NUCLEAR MEDICINE

## 2018-07-25 PROCEDURE — 99213 OFFICE O/P EST LOW 20 MIN: CPT | Performed by: SURGERY

## 2018-07-25 PROCEDURE — 1123F ACP DISCUSS/DSCN MKR DOCD: CPT | Performed by: NUCLEAR MEDICINE

## 2018-07-25 PROCEDURE — 1101F PT FALLS ASSESS-DOCD LE1/YR: CPT | Performed by: SURGERY

## 2018-07-25 PROCEDURE — G8598 ASA/ANTIPLAT THER USED: HCPCS | Performed by: NUCLEAR MEDICINE

## 2018-07-25 PROCEDURE — 1036F TOBACCO NON-USER: CPT | Performed by: NUCLEAR MEDICINE

## 2018-07-25 PROCEDURE — 99213 OFFICE O/P EST LOW 20 MIN: CPT | Performed by: NUCLEAR MEDICINE

## 2018-07-25 PROCEDURE — 93000 ELECTROCARDIOGRAM COMPLETE: CPT | Performed by: NUCLEAR MEDICINE

## 2018-07-25 PROCEDURE — 4040F PNEUMOC VAC/ADMIN/RCVD: CPT | Performed by: NUCLEAR MEDICINE

## 2018-07-25 PROCEDURE — 1101F PT FALLS ASSESS-DOCD LE1/YR: CPT | Performed by: NUCLEAR MEDICINE

## 2018-07-25 NOTE — PROGRESS NOTES
mouth daily        No current facility-administered medications for this visit. Allergies   Allergen Reactions    Prednisone      Raised BP     Health Maintenance   Topic Date Due    DTaP/Tdap/Td vaccine (1 - Tdap) 02/05/1953    Shingles Vaccine (1 of 2 - 2 Dose Series) 02/05/1984    Pneumococcal low/med risk (1 of 2 - PCV13) 02/05/1999    Flu vaccine (1) 09/01/2018       Subjective:  Review of Systems  General:   No fever, no chills, No fatigue or weight loss  Pulmonary:    No dyspnea, no wheezing  Cardiac:    Denies recent chest pain,   GI:     No nausea or vomiting, no abdominal pain  Neuro:    No dizziness or light headedness,   Musculoskeletal:  No recent active issues  Extremities:   No edema, good peripheral pulses      Objective:  Physical Exam  BP (!) 146/60   Pulse 51   Ht 5' 10\" (1.778 m)   Wt 170 lb (77.1 kg)   BMI 24.39 kg/m²   General:   Well developed, well nourished  Lungs:   Clear to auscultation  Heart:    Normal S1 S2, Slight murmur. no rubs, no gallops  Abdomen:   Soft, non tender, no organomegalies, positive bowel sounds  Extremities:   No edema, no cyanosis, good peripheral pulses  Neurological:   Awake, alert, oriented. No obvious focal deficits  Musculoskelatal:  No obvious deformities  Assessment:      Diagnosis Orders   1. Coronary artery disease of native artery of native heart with stable angina pectoris ([address]  EKG 12 lead   2. Chronic atrial fibrillation (HCC)  EKG 12 lead   3. Dizziness  EKG 12 lead   4. Thoracic aortic aneurysm without rupture (HCC)  EKG 12 lead   cardiac fair for now   ECG in office was done today. I reviewed the ECG. No acute findings    Plan:  No Follow-up on file. As above  Continue risk factor modification and medical management  Thank you for allowing me to participate in the care of your patient.  Please don't hesitate to contact me regarding any further issues related to the patient care    Orders Placed:  Orders Placed This Encounter   Procedures

## 2018-07-27 LAB
ANION GAP SERPL CALCULATED.3IONS-SCNC: 14 MEQ/L (ref 10–19)
BUN BLDV-MCNC: 17 MG/DL (ref 8–23)
CALCIUM SERPL-MCNC: 9.4 MG/DL (ref 8.5–10.5)
CHLORIDE BLD-SCNC: 105 MEQ/L (ref 95–107)
CO2: 26 MEQ/L (ref 19–31)
CREAT SERPL-MCNC: 0.9 MG/DL (ref 0.8–1.4)
EGFR AFRICAN AMERICAN: 90.6 ML/MIN/1.73 M2
EGFR IF NONAFRICAN AMERICAN: 78.2 ML/MIN/1.73 M2
GLUCOSE: 98 MG/DL (ref 70–99)
HCT VFR BLD CALC: 41.8 % (ref 41.4–51)
HEMOGLOBIN: 14.5 G/DL (ref 13.8–17)
POTASSIUM SERPL-SCNC: 4.9 MEQ/L (ref 3.5–5.4)
SODIUM BLD-SCNC: 145 MEQ/L (ref 135–146)

## 2018-07-30 ASSESSMENT — ENCOUNTER SYMPTOMS
FACIAL SWELLING: 0
BACK PAIN: 0
CHOKING: 0
COLOR CHANGE: 0
SINUS PRESSURE: 0
SORE THROAT: 0
STRIDOR: 0
ALLERGIC/IMMUNOLOGIC NEGATIVE: 1
ABDOMINAL PAIN: 1
DIARRHEA: 0
ANAL BLEEDING: 0
ABDOMINAL DISTENTION: 0
VOMITING: 0
SHORTNESS OF BREATH: 0
COUGH: 0
NAUSEA: 0
RHINORRHEA: 0
PHOTOPHOBIA: 0
EYE ITCHING: 0
TROUBLE SWALLOWING: 0
EYE DISCHARGE: 0
WHEEZING: 0
BLOOD IN STOOL: 0
RECTAL PAIN: 0
CHEST TIGHTNESS: 0
EYE REDNESS: 0
EYE PAIN: 0
APNEA: 0
CONSTIPATION: 0
VOICE CHANGE: 0

## 2018-08-15 NOTE — H&P
 levothyroxine (SYNTHROID) 25 MCG tablet Take 12.5 mcg by mouth Daily        aspirin 81 MG tablet Take 81 mg by mouth daily.        MULTIPLE VITAMIN PO Take by mouth daily           No current facility-administered medications for this visit.                   Allergies   Allergen Reactions    Prednisone         Raised BP         Family History         Family History   Problem Relation Age of Onset    Heart Disease Mother      Heart Attack Mother      Diabetes Father      Heart Disease Father      High Blood Pressure Father      Cancer Brother      Diabetes Brother      Heart Disease Brother      Diabetes Brother      Stroke Brother              Social History   Social History            Social History    Marital status:        Spouse name: N/A    Number of children: 4    Years of education: N/A          Occupational History    Not on file.           Social History Main Topics    Smoking status: Never Smoker    Smokeless tobacco: Never Used    Alcohol use No    Drug use: No    Sexual activity: Not on file           Other Topics Concern    Not on file          Social History Narrative    No narrative on file             Vitals:     07/25/18 1335   BP: 132/60   Pulse: 60   Resp: 18   Temp: 96.6 °F (35.9 °C)   SpO2: 95%      Body mass index is 24.55 kg/m².     Objective:   Physical Exam   Constitutional: He is oriented to person, place, and time. He appears well-developed and well-nourished. No distress. HENT:   Head: Normocephalic and atraumatic. Right Ear: External ear normal.   Left Ear: External ear normal.   Nose: Nose normal.   Mouth/Throat: Oropharynx is clear and moist and mucous membranes are normal. No oropharyngeal exudate. Eyes: Conjunctivae and EOM are normal. Pupils are equal, round, and reactive to light. Right eye exhibits no discharge. Left eye exhibits no discharge. No scleral icterus.    Neck: Trachea normal, normal range of motion, full passive range of motion

## 2018-08-16 ENCOUNTER — ANESTHESIA (OUTPATIENT)
Dept: OPERATING ROOM | Age: 83
End: 2018-08-16
Payer: MEDICARE

## 2018-08-16 ENCOUNTER — HOSPITAL ENCOUNTER (OUTPATIENT)
Age: 83
Setting detail: OUTPATIENT SURGERY
Discharge: HOME | End: 2018-08-16
Attending: SURGERY | Admitting: SURGERY
Payer: MEDICARE

## 2018-08-16 ENCOUNTER — ANESTHESIA EVENT (OUTPATIENT)
Dept: OPERATING ROOM | Age: 83
End: 2018-08-16
Payer: MEDICARE

## 2018-08-16 VITALS
RESPIRATION RATE: 3 BRPM | TEMPERATURE: 97 F | DIASTOLIC BLOOD PRESSURE: 71 MMHG | OXYGEN SATURATION: 99 % | SYSTOLIC BLOOD PRESSURE: 137 MMHG

## 2018-08-16 VITALS
WEIGHT: 168.4 LBS | BODY MASS INDEX: 24.11 KG/M2 | SYSTOLIC BLOOD PRESSURE: 120 MMHG | OXYGEN SATURATION: 93 % | RESPIRATION RATE: 18 BRPM | HEART RATE: 88 BPM | HEIGHT: 70 IN | TEMPERATURE: 98.4 F | DIASTOLIC BLOOD PRESSURE: 62 MMHG

## 2018-08-16 DIAGNOSIS — K40.20 NON-RECURRENT BILATERAL INGUINAL HERNIA WITHOUT OBSTRUCTION OR GANGRENE: Primary | ICD-10-CM

## 2018-08-16 LAB
APTT: 36.8 SECONDS (ref 22–38)
INR BLD: 1.01 (ref 0.85–1.13)
POTASSIUM SERPL-SCNC: 5.1 MEQ/L (ref 3.5–5.2)

## 2018-08-16 PROCEDURE — C1781 MESH (IMPLANTABLE): HCPCS | Performed by: SURGERY

## 2018-08-16 PROCEDURE — 84132 ASSAY OF SERUM POTASSIUM: CPT

## 2018-08-16 PROCEDURE — 6360000002 HC RX W HCPCS: Performed by: NURSE ANESTHETIST, CERTIFIED REGISTERED

## 2018-08-16 PROCEDURE — L0625 LO FLEX L1-BELOW L5 PRE OTS: HCPCS | Performed by: SURGERY

## 2018-08-16 PROCEDURE — 7100000011 HC PHASE II RECOVERY - ADDTL 15 MIN: Performed by: SURGERY

## 2018-08-16 PROCEDURE — 6370000000 HC RX 637 (ALT 250 FOR IP): Performed by: SURGERY

## 2018-08-16 PROCEDURE — 2580000003 HC RX 258: Performed by: SURGERY

## 2018-08-16 PROCEDURE — S2900 ROBOTIC SURGICAL SYSTEM: HCPCS | Performed by: SURGERY

## 2018-08-16 PROCEDURE — 3600000009 HC SURGERY ROBOT BASE: Performed by: SURGERY

## 2018-08-16 PROCEDURE — 36415 COLL VENOUS BLD VENIPUNCTURE: CPT

## 2018-08-16 PROCEDURE — 6360000002 HC RX W HCPCS: Performed by: SURGERY

## 2018-08-16 PROCEDURE — 2580000003 HC RX 258: Performed by: NURSE ANESTHETIST, CERTIFIED REGISTERED

## 2018-08-16 PROCEDURE — 7100000000 HC PACU RECOVERY - FIRST 15 MIN: Performed by: SURGERY

## 2018-08-16 PROCEDURE — 85610 PROTHROMBIN TIME: CPT

## 2018-08-16 PROCEDURE — 7100000010 HC PHASE II RECOVERY - FIRST 15 MIN: Performed by: SURGERY

## 2018-08-16 PROCEDURE — 2709999900 HC NON-CHARGEABLE SUPPLY: Performed by: SURGERY

## 2018-08-16 PROCEDURE — 3700000000 HC ANESTHESIA ATTENDED CARE: Performed by: SURGERY

## 2018-08-16 PROCEDURE — 2500000003 HC RX 250 WO HCPCS: Performed by: NURSE ANESTHETIST, CERTIFIED REGISTERED

## 2018-08-16 PROCEDURE — 85730 THROMBOPLASTIN TIME PARTIAL: CPT

## 2018-08-16 PROCEDURE — 3700000001 HC ADD 15 MINUTES (ANESTHESIA): Performed by: SURGERY

## 2018-08-16 PROCEDURE — 7100000001 HC PACU RECOVERY - ADDTL 15 MIN: Performed by: SURGERY

## 2018-08-16 PROCEDURE — 3600000019 HC SURGERY ROBOT ADDTL 15MIN: Performed by: SURGERY

## 2018-08-16 PROCEDURE — 49650 LAP ING HERNIA REPAIR INIT: CPT | Performed by: SURGERY

## 2018-08-16 PROCEDURE — 2500000003 HC RX 250 WO HCPCS: Performed by: SURGERY

## 2018-08-16 DEVICE — MESH HERN L W10.8XL16CM L INGUINAL WHT POLYPR MFIL: Type: IMPLANTABLE DEVICE | Status: FUNCTIONAL

## 2018-08-16 DEVICE — MESH HERN L W10.8XL16CM R INGUINAL WHT POLYPR MFIL: Type: IMPLANTABLE DEVICE | Status: FUNCTIONAL

## 2018-08-16 RX ORDER — HYDROCODONE BITARTRATE AND ACETAMINOPHEN 5; 325 MG/1; MG/1
1 TABLET ORAL EVERY 4 HOURS PRN
Status: DISCONTINUED | OUTPATIENT
Start: 2018-08-16 | End: 2018-08-16 | Stop reason: HOSPADM

## 2018-08-16 RX ORDER — ACETAMINOPHEN 325 MG/1
650 TABLET ORAL EVERY 4 HOURS PRN
Status: DISCONTINUED | OUTPATIENT
Start: 2018-08-16 | End: 2018-08-16 | Stop reason: HOSPADM

## 2018-08-16 RX ORDER — MORPHINE SULFATE 2 MG/ML
4 INJECTION, SOLUTION INTRAMUSCULAR; INTRAVENOUS
Status: DISCONTINUED | OUTPATIENT
Start: 2018-08-16 | End: 2018-08-16 | Stop reason: HOSPADM

## 2018-08-16 RX ORDER — ROCURONIUM BROMIDE 10 MG/ML
INJECTION, SOLUTION INTRAVENOUS PRN
Status: DISCONTINUED | OUTPATIENT
Start: 2018-08-16 | End: 2018-08-16 | Stop reason: SDUPTHER

## 2018-08-16 RX ORDER — SODIUM CHLORIDE 9 MG/ML
INJECTION, SOLUTION INTRAVENOUS CONTINUOUS PRN
Status: DISCONTINUED | OUTPATIENT
Start: 2018-08-16 | End: 2018-08-16 | Stop reason: SDUPTHER

## 2018-08-16 RX ORDER — PROMETHAZINE HYDROCHLORIDE 25 MG/ML
INJECTION, SOLUTION INTRAMUSCULAR; INTRAVENOUS PRN
Status: DISCONTINUED | OUTPATIENT
Start: 2018-08-16 | End: 2018-08-16 | Stop reason: SDUPTHER

## 2018-08-16 RX ORDER — GLYCOPYRROLATE 1 MG/5 ML
SYRINGE (ML) INTRAVENOUS PRN
Status: DISCONTINUED | OUTPATIENT
Start: 2018-08-16 | End: 2018-08-16 | Stop reason: SDUPTHER

## 2018-08-16 RX ORDER — NEOSTIGMINE METHYLSULFATE 1 MG/ML
INJECTION, SOLUTION INTRAVENOUS PRN
Status: DISCONTINUED | OUTPATIENT
Start: 2018-08-16 | End: 2018-08-16 | Stop reason: SDUPTHER

## 2018-08-16 RX ORDER — DEXAMETHASONE SODIUM PHOSPHATE 4 MG/ML
INJECTION, SOLUTION INTRA-ARTICULAR; INTRALESIONAL; INTRAMUSCULAR; INTRAVENOUS; SOFT TISSUE PRN
Status: DISCONTINUED | OUTPATIENT
Start: 2018-08-16 | End: 2018-08-16 | Stop reason: SDUPTHER

## 2018-08-16 RX ORDER — SODIUM CHLORIDE 0.9 % (FLUSH) 0.9 %
10 SYRINGE (ML) INJECTION EVERY 12 HOURS SCHEDULED
Status: DISCONTINUED | OUTPATIENT
Start: 2018-08-16 | End: 2018-08-16 | Stop reason: HOSPADM

## 2018-08-16 RX ORDER — FENTANYL CITRATE 50 UG/ML
25 INJECTION, SOLUTION INTRAMUSCULAR; INTRAVENOUS EVERY 5 MIN PRN
Status: DISCONTINUED | OUTPATIENT
Start: 2018-08-16 | End: 2018-08-16 | Stop reason: HOSPADM

## 2018-08-16 RX ORDER — HYDRALAZINE HYDROCHLORIDE 20 MG/ML
5 INJECTION INTRAMUSCULAR; INTRAVENOUS EVERY 10 MIN PRN
Status: DISCONTINUED | OUTPATIENT
Start: 2018-08-16 | End: 2018-08-16 | Stop reason: HOSPADM

## 2018-08-16 RX ORDER — LABETALOL HYDROCHLORIDE 5 MG/ML
5 INJECTION, SOLUTION INTRAVENOUS EVERY 10 MIN PRN
Status: DISCONTINUED | OUTPATIENT
Start: 2018-08-16 | End: 2018-08-16 | Stop reason: HOSPADM

## 2018-08-16 RX ORDER — SODIUM CHLORIDE 9 MG/ML
INJECTION, SOLUTION INTRAVENOUS CONTINUOUS
Status: DISCONTINUED | OUTPATIENT
Start: 2018-08-16 | End: 2018-08-16 | Stop reason: HOSPADM

## 2018-08-16 RX ORDER — FENTANYL CITRATE 50 UG/ML
INJECTION, SOLUTION INTRAMUSCULAR; INTRAVENOUS PRN
Status: DISCONTINUED | OUTPATIENT
Start: 2018-08-16 | End: 2018-08-16 | Stop reason: SDUPTHER

## 2018-08-16 RX ORDER — LIDOCAINE HYDROCHLORIDE 10 MG/ML
INJECTION, SOLUTION INFILTRATION; PERINEURAL PRN
Status: DISCONTINUED | OUTPATIENT
Start: 2018-08-16 | End: 2018-08-16 | Stop reason: SDUPTHER

## 2018-08-16 RX ORDER — ONDANSETRON 2 MG/ML
4 INJECTION INTRAMUSCULAR; INTRAVENOUS EVERY 6 HOURS PRN
Status: DISCONTINUED | OUTPATIENT
Start: 2018-08-16 | End: 2018-08-16 | Stop reason: HOSPADM

## 2018-08-16 RX ORDER — SODIUM CHLORIDE 0.9 % (FLUSH) 0.9 %
10 SYRINGE (ML) INJECTION PRN
Status: DISCONTINUED | OUTPATIENT
Start: 2018-08-16 | End: 2018-08-16 | Stop reason: HOSPADM

## 2018-08-16 RX ORDER — HYDROCODONE BITARTRATE AND ACETAMINOPHEN 5; 325 MG/1; MG/1
2 TABLET ORAL EVERY 4 HOURS PRN
Status: DISCONTINUED | OUTPATIENT
Start: 2018-08-16 | End: 2018-08-16 | Stop reason: HOSPADM

## 2018-08-16 RX ORDER — EPHEDRINE SULFATE 50 MG/ML
INJECTION INTRAVENOUS PRN
Status: DISCONTINUED | OUTPATIENT
Start: 2018-08-16 | End: 2018-08-16 | Stop reason: SDUPTHER

## 2018-08-16 RX ORDER — HYDROCODONE BITARTRATE AND ACETAMINOPHEN 5; 325 MG/1; MG/1
1-2 TABLET ORAL EVERY 6 HOURS PRN
Qty: 25 TABLET | Refills: 0 | Status: SHIPPED | OUTPATIENT
Start: 2018-08-16 | End: 2018-08-23

## 2018-08-16 RX ORDER — FENTANYL CITRATE 50 UG/ML
50 INJECTION, SOLUTION INTRAMUSCULAR; INTRAVENOUS EVERY 5 MIN PRN
Status: DISCONTINUED | OUTPATIENT
Start: 2018-08-16 | End: 2018-08-16 | Stop reason: HOSPADM

## 2018-08-16 RX ORDER — MORPHINE SULFATE 2 MG/ML
2 INJECTION, SOLUTION INTRAMUSCULAR; INTRAVENOUS
Status: DISCONTINUED | OUTPATIENT
Start: 2018-08-16 | End: 2018-08-16 | Stop reason: HOSPADM

## 2018-08-16 RX ORDER — ONDANSETRON 2 MG/ML
4 INJECTION INTRAMUSCULAR; INTRAVENOUS
Status: DISCONTINUED | OUTPATIENT
Start: 2018-08-16 | End: 2018-08-16 | Stop reason: HOSPADM

## 2018-08-16 RX ORDER — PROPOFOL 10 MG/ML
INJECTION, EMULSION INTRAVENOUS PRN
Status: DISCONTINUED | OUTPATIENT
Start: 2018-08-16 | End: 2018-08-16 | Stop reason: SDUPTHER

## 2018-08-16 RX ORDER — BUPIVACAINE HYDROCHLORIDE AND EPINEPHRINE 5; 5 MG/ML; UG/ML
INJECTION, SOLUTION EPIDURAL; INTRACAUDAL; PERINEURAL PRN
Status: DISCONTINUED | OUTPATIENT
Start: 2018-08-16 | End: 2018-08-16 | Stop reason: HOSPADM

## 2018-08-16 RX ADMIN — SODIUM CHLORIDE: 9 INJECTION, SOLUTION INTRAVENOUS at 10:09

## 2018-08-16 RX ADMIN — HYDROCODONE BITARTRATE AND ACETAMINOPHEN 1 TABLET: 5; 325 TABLET ORAL at 13:50

## 2018-08-16 RX ADMIN — PROMETHAZINE HYDROCHLORIDE 12.5 MG: 25 INJECTION INTRAMUSCULAR; INTRAVENOUS at 10:20

## 2018-08-16 RX ADMIN — ROCURONIUM BROMIDE 10 MG: 10 INJECTION INTRAVENOUS at 10:46

## 2018-08-16 RX ADMIN — CEFAZOLIN 1 G: 1 INJECTION, POWDER, FOR SOLUTION INTRAMUSCULAR; INTRAVENOUS; PARENTERAL at 10:18

## 2018-08-16 RX ADMIN — ROCURONIUM BROMIDE 40 MG: 10 INJECTION INTRAVENOUS at 10:13

## 2018-08-16 RX ADMIN — NEOSTIGMINE METHYLSULFATE 2 MG: 1 INJECTION, SOLUTION INTRAVENOUS at 11:41

## 2018-08-16 RX ADMIN — DEXAMETHASONE SODIUM PHOSPHATE 4 MG: 4 INJECTION, SOLUTION INTRAMUSCULAR; INTRAVENOUS at 10:20

## 2018-08-16 RX ADMIN — FENTANYL CITRATE 50 MCG: 50 INJECTION INTRAMUSCULAR; INTRAVENOUS at 11:30

## 2018-08-16 RX ADMIN — FENTANYL CITRATE 50 MCG: 50 INJECTION INTRAMUSCULAR; INTRAVENOUS at 10:13

## 2018-08-16 RX ADMIN — Medication 0.2 MG: at 10:25

## 2018-08-16 RX ADMIN — SODIUM CHLORIDE: 9 INJECTION, SOLUTION INTRAVENOUS at 11:04

## 2018-08-16 RX ADMIN — Medication 0.6 MG: at 11:31

## 2018-08-16 RX ADMIN — PROPOFOL 120 MG: 10 INJECTION, EMULSION INTRAVENOUS at 10:13

## 2018-08-16 RX ADMIN — HYDROCODONE BITARTRATE AND ACETAMINOPHEN 1 TABLET: 5; 325 TABLET ORAL at 15:15

## 2018-08-16 RX ADMIN — NEOSTIGMINE METHYLSULFATE 3 MG: 1 INJECTION, SOLUTION INTRAVENOUS at 11:31

## 2018-08-16 RX ADMIN — EPHEDRINE SULFATE 10 MG: 50 INJECTION, SOLUTION INTRAVENOUS at 10:29

## 2018-08-16 RX ADMIN — Medication 0.2 MG: at 11:41

## 2018-08-16 RX ADMIN — SODIUM CHLORIDE: 9 INJECTION, SOLUTION INTRAVENOUS at 08:23

## 2018-08-16 RX ADMIN — LIDOCAINE HYDROCHLORIDE 50 MG: 10 INJECTION, SOLUTION INFILTRATION; PERINEURAL at 10:13

## 2018-08-16 ASSESSMENT — PULMONARY FUNCTION TESTS
PIF_VALUE: 23
PIF_VALUE: 23
PIF_VALUE: 1
PIF_VALUE: 20
PIF_VALUE: 1
PIF_VALUE: 23
PIF_VALUE: 23
PIF_VALUE: 0
PIF_VALUE: 24
PIF_VALUE: 22
PIF_VALUE: 20
PIF_VALUE: 23
PIF_VALUE: 23
PIF_VALUE: 21
PIF_VALUE: 25
PIF_VALUE: 23
PIF_VALUE: 22
PIF_VALUE: 22
PIF_VALUE: 14
PIF_VALUE: 12
PIF_VALUE: 12
PIF_VALUE: 22
PIF_VALUE: 23
PIF_VALUE: 22
PIF_VALUE: 23
PIF_VALUE: 18
PIF_VALUE: 4
PIF_VALUE: 22
PIF_VALUE: 23
PIF_VALUE: 1
PIF_VALUE: 22
PIF_VALUE: 22
PIF_VALUE: 23
PIF_VALUE: 23
PIF_VALUE: 24
PIF_VALUE: 23
PIF_VALUE: 4
PIF_VALUE: 24
PIF_VALUE: 12
PIF_VALUE: 23
PIF_VALUE: 23
PIF_VALUE: 11
PIF_VALUE: 23
PIF_VALUE: 23
PIF_VALUE: 2
PIF_VALUE: 24
PIF_VALUE: 12
PIF_VALUE: 1
PIF_VALUE: 23
PIF_VALUE: 22
PIF_VALUE: 23
PIF_VALUE: 13
PIF_VALUE: 0
PIF_VALUE: 19
PIF_VALUE: 0
PIF_VALUE: 11
PIF_VALUE: 11
PIF_VALUE: 22
PIF_VALUE: 21
PIF_VALUE: 19
PIF_VALUE: 24
PIF_VALUE: 23
PIF_VALUE: 23
PIF_VALUE: 7
PIF_VALUE: 21
PIF_VALUE: 2
PIF_VALUE: 23
PIF_VALUE: 23
PIF_VALUE: 1
PIF_VALUE: 2
PIF_VALUE: 12
PIF_VALUE: 1
PIF_VALUE: 12
PIF_VALUE: 0
PIF_VALUE: 9
PIF_VALUE: 23
PIF_VALUE: 22
PIF_VALUE: 24
PIF_VALUE: 23
PIF_VALUE: 23
PIF_VALUE: 21
PIF_VALUE: 24
PIF_VALUE: 22
PIF_VALUE: 23
PIF_VALUE: 21
PIF_VALUE: 23
PIF_VALUE: 3
PIF_VALUE: 9
PIF_VALUE: 5
PIF_VALUE: 24
PIF_VALUE: 20

## 2018-08-16 ASSESSMENT — PAIN - FUNCTIONAL ASSESSMENT: PAIN_FUNCTIONAL_ASSESSMENT: 0-10

## 2018-08-16 ASSESSMENT — PAIN DESCRIPTION - LOCATION: LOCATION: ABDOMEN

## 2018-08-16 ASSESSMENT — PAIN SCALES - GENERAL
PAINLEVEL_OUTOF10: 10
PAINLEVEL_OUTOF10: 10
PAINLEVEL_OUTOF10: 5
PAINLEVEL_OUTOF10: 10
PAINLEVEL_OUTOF10: 10

## 2018-08-16 ASSESSMENT — PAIN DESCRIPTION - PAIN TYPE
TYPE: SURGICAL PAIN
TYPE: SURGICAL PAIN

## 2018-08-16 NOTE — INTERVAL H&P NOTE
[facility]  History and Physical Update    Pt Name: Huber Starr  MRN: [id number]  YOB: 1934  Date of evaluation: 8/16/2018    [x] I have examined the patient and reviewed the H&P/Consult and there are no changes to the patient or plans.     [] I have examined the patient and reviewed the H&P/Consult and have noted the following changes:        Kika Mason  Electronically signed 8/16/2018 at 9:30 AM

## 2018-08-16 NOTE — PROGRESS NOTES
1 Pt has been transferred to PACU, hooked up to monitor and SCDs. Pt is asleep, awakens to verbal stimuli. Will easily doze back to sleep. 4LNC. Abdomen is soft/flat. Abdominal binder reinforced and ice pack placed over abdomen. x3 surgical sites with steri strips and band aid C/D/I. Warm blankets placed over pt.  1157 Pt remains asleep, RR 21. Awakens to his name and tactile stimulation, will doze back to sleep. 1214 Pt asleep, awakens to repeated name. Pt stated his age to be [de-identified]. \" Pt does not know where he is or why. Reoriented pt to [facility]/recovery and his surgery has been completed. Pt nodded his head yes and dozed back to sleep. 12 Pt awakens to repeated name and tactile stimulation, needed reoriented to place and situation. Pt stated his abdomen hurts, dozed back to sleep. Family updated. 1257 Pt will intermittently open his eyes and look around, doze back to sleep. 5  evaluated pt. Pt remains sleepy and needs repeated verbal stimulation in order to awaken. Vitals have been stable, doctor stated ok to transfer to [facility]. [phone] Pt transferred to [facility], report given to nurse, and family at bedside. Warm blankets placed over pt. Pt complaining of abdominal pain -reviewed with pt and family, he needs to be more awake in order to receive narcotics. 12 Pt starting to become more aware of his surroundings and talking to his family.

## 2018-08-16 NOTE — PROGRESS NOTES
Oriented to sds     20      gave information on flu and pneumonia vaccine. Refuses flu and pneumonia vaccine. Family updated to register with volunteer out at . Informed family to take belonging with them. Keep nothing of value in room unattended. Medication given back to family. Family is taking them with them.

## 2018-08-16 NOTE — ANESTHESIA PRE PROCEDURE
Department of Anesthesiology  Preprocedure Note       Name:  Beatrice Michael   Age:  80 y.o.  :  1934                                          MRN:  [id number]         Date:  2018      Surgeon: Jacob Reddy):  Kurt Jj MD    Procedure: Procedure(s):  ROBOTIC RIGHT INGUINAL HERNIA REPAIR, POSSIBLE LEFT, POSSIBLE OPEN, POSSIBLE MESH    Medications prior to admission:   Prior to Admission medications    Medication Sig Start Date End Date Taking? Authorizing Provider   atorvastatin (LIPITOR) 10 MG tablet Take 1 tablet by mouth daily 1/15/18  Yes Soniya Boyce MD   amLODIPine (NORVASC) 2.5 MG tablet Take 1 tablet by mouth daily 1/15/18  Yes Soniya Boyce MD   flecainide (TAMBOCOR) 100 MG tablet TAKE ONE TABLET BY MOUTH TWICE A DAY 1/15/18  Yes Soniya Boyce MD   levothyroxine (SYNTHROID) 25 MCG tablet Take 12.5 mcg by mouth Daily   Yes Historical Provider, MD   aspirin 81 MG tablet Take 81 mg by mouth daily. Yes Historical Provider, MD   rivaroxaban (XARELTO) 20 MG TABS tablet Take 1 tablet by mouth daily (with breakfast) 1/15/18   Soniya Boyce MD   MULTIPLE VITAMIN PO Take by mouth daily     Historical Provider, MD       Current medications:    Current Facility-Administered Medications   Medication Dose Route Frequency Provider Last Rate Last Dose    0.9 % sodium chloride infusion   Intravenous Continuous Kurt Jj  mL/hr at 18 0823      ceFAZolin (ANCEF) 1 g in dextrose 5 % 50 mL IVPB (mini-bag)  1 g Intravenous 30 Min Pre-Op Kurt Jj MD           Allergies:     Allergies   Allergen Reactions    Prednisone      Raised BP       Problem List:    Patient Active Problem List   Diagnosis Code    Kidney stone N20.0    Stroke (cerebrum) ([facility]) I63.9    Chronic atrial fibrillation (HCC) I48.2    Dizziness R42    Thoracic aortic aneurysm without rupture ([facility]) I71.2    Coronary artery disease of native artery of native heart with stable angina 07/26/2018    PROT 6.3 09/09/2017    CALCIUM 9.4 07/26/2018    BILITOT <0.2 09/09/2017    ALKPHOS 78 09/09/2017    AST 16 09/09/2017    ALT 16 09/09/2017       POC Tests: No results for input(s): POCGLU, POCNA, POCK, POCCL, POCBUN, POCHEMO, POCHCT in the last 72 hours. Coags:   Lab Results   Component Value Date    INR 1.01 08/16/2018    APTT 36.8 08/16/2018       HCG (If Applicable): No results found for: PREGTESTUR, PREGSERUM, HCG, HCGQUANT     ABGs: No results found for: PHART, PO2ART, SYT4CGK, DRU6TKC, BEART, X5KNGWUU     Type & Screen (If Applicable):  Lab Results   Component Value Date    LABRH POS 09/15/2017       Anesthesia Evaluation   no history of anesthetic complications:   Airway: Mallampati: II  TM distance: >3 FB   Neck ROM: full  Mouth opening: > = 3 FB Dental:          Pulmonary:normal exam              Patient did not smoke on day of surgery. Cardiovascular:  Exercise tolerance: good (>4 METS),   (+) hypertension:, CAD:, dysrhythmias: atrial fibrillation,       ECG reviewed      Echocardiogram reviewed    Cleared by cardiology              Neuro/Psych:   (+) CVA:,             GI/Hepatic/Renal: Neg GI/Hepatic/Renal ROS            Endo/Other: Negative Endo/Other ROS             Pt had no PAT visit       Abdominal:           Vascular: negative vascular ROS. Anesthesia Plan      general     ASA 3       Induction: intravenous. MIPS: Postoperative opioids intended and Prophylactic antiemetics administered. Anesthetic plan and risks discussed with patient. Plan discussed with CRNA.                   Marquita Amezquita MD   8/16/2018

## 2018-08-16 NOTE — OP NOTE
[address] [place] [address]                                 OPERATIVE REPORT    PATIENT NAME: Yisel Rivera                :        1934  MED REC NO:   [id number]                           ROOM:  ACCOUNT NO:   [de-identified]                           ADMIT DATE: 2018  PROVIDER:     MICHAEL Drake Osei OF PROCEDURE:  2018    PREOPERATIVE DIAGNOSIS:  Right inguinal hernia. POSTOPERATIVE DIAGNOSIS:  Bilateral inguinal hernia. PROCEDURES:  1.  Robotic repair, right indirect inguinal hernia with mesh (large 3DMax  mesh). 2.  Robotic repair, left indirect inguinal hernia with mesh (large 3DMax  mesh). SURGEONS:  Rashi Geiger MD.    Corina Bonilla. [place], [facility]. ANESTHESIA:  General/local.    ESTIMATED BLOOD LOSS:  10 mL. DRAINS:  None. COMPLICATIONS:  None. DISPOSITION:  Stable to the recovery room. INDICATIONS:  The patient is an 51-year-old gentleman who I had seen in the  office secondary to right inguinal hernia. He was found to have possible  small left inguinal hernia as well. He wished to proceed with repair. Both operative and nonoperative intervention plans were discussed. He  wished to proceed with surgery. Risks of surgery were then further  discussed. Some of the risks included but were not limited to bleeding,  infection, the need for reoperation, severe chronic postoperative pain,  nausea or vomiting, cardiopulmonary complications, anesthetic  complications, seroma/hematoma formation, wound breakdown, recurrence of  the hernia, chronic groin pain, ischemic orchitis resulting in loss of  testicle, mesh infection requiring removal of the mesh, and death. After  all of the questions were answered in their entirety and the patient was  completely aware of the current situation, he elected to proceed with the  procedure.     DESCRIPTION OF THE PROCEDURE:  After informed consent was signed and placed  on the chart, the patient was taken back to the operating room, placed  supine on the operating room table. General anesthesia was induced. He  tolerated this well throughout the case. All pressure points were padded. He was on preoperative antibiotics. Bilateral lower extremity sequential  compression devices were placed prior to incision. His abdomen, pelvis, and groin region was all prepped and draped in usual  sterile standard fashion. A time-out occurred prior to the operation which  not only identified the patient but also the planned procedure to be  performed. At the end of the time-out, there were no questions or  concerns. I began the operation first by making a small transverse  incision just above the umbilicus. Fascia was elevated and a Veress needle  inserted. Intraabdominal cavity was insufflated to a pressure of  approximately 15 mmHg carbon dioxide gas. The patient tolerated  insufflation well. An 8-mm trocar placed. Laparoscope inserted. Upon initial evaluation,  there was no hollow viscus, solid organ, or major vascular injury with  Veress needle insertion or the first trocar placement. An 8-mm trocar was  then placed also on the right lateral abdominal region. A 5-mm was then  placed in the left lateral abdominal region. The patient was placed in  Trendelenburg. Robot brought in and docked. Instruments placed under  direct vision. Once everything was aligned and in order, I then unscrubbed  and was back to the console. There was a large right indirect inguinal  hernia. There was also a smaller left indirect inguinal hernia. I began  on the right side. A transverse incision made across the peritoneum with  monopolar scissors. Preperitoneal space was entered into and this was  fully dissected out to the standard landmarks. Hernia sac was dissected  off of the cord structures. The cord structures otherwise were evaluated. There appeared to be no other abnormal findings other than a small cord  lipoma which was removed and discarded. After the hernia sac had been  completely reduced back into the intraabdominal cavity, the large 3DMax  mesh was then brought in and secured. This initially was secured to  Geraldo's x2 with #0 Ethibond suture, twice to anterior abdominal wall and  once laterally. Care was taken during dissection, also during the suture  placement to avoid neurovascular bundles, cord structures, triangle of  pain, and triangle of doom. Peritoneal flaps then reapproximated with  running 3-0 V-Loc x2. Hernia sac was incorporated up to the anterior  abdominal wall during closure. A transverse incision was then made in a mirror image on the left side. Preperitoneal plane was again fully dissected out to the standard  landmarks. Cord structures circumferentially dissected free and again the  hernia sac was dissected off the cord structures and reduced back into the  intraabdominal cavity. A large 3DMax mesh was then brought in and secured. Again, #0 Ethibond suture was used to secure the mesh to Geraldo's x2, twice  to the anterior abdominal wall and once laterally. Again, care was taken  during dissection and also during suture placement to avoid neurovascular  bundles, triangle of pain, triangle of doom, and cord structures. Hemostasis appeared to be excellent. The peritoneal flaps were then again  reapproximated with a running 3-0 V-Loc. During closure, the hernia sac  was incorporated up to the intraabdominal wall. Instruments removed, robot  undocked, and I scrubbed back into the case. Trocar sites were all closed and at the fascial level with #0 Vicryl suture  on a Storz suture passer under direct vision. At the completion, there  were no fascial defects. Skin was then reapproximated with 4-0 Vicryl in a  subcuticular fashion. Care was taken to avoid any bowel injury during  fascial closure. Closed incisions were then cleaned, dried, Steri-Strips  applied. 0.5% Marcaine with epinephrine was used for local anesthetic. The patient tolerated injection of local anesthetic without difficulty. Sponge, needle, and instrumentation count was correct at the end of the  procedure. The patient tolerated the procedure well with no apparent  complications and only about 10-20 mL of blood loss. He was later brought  out of general anesthesia and then transferred to postanesthesia care unit  in a stable condition.         Dyana Murry M.D.    D: 08/16/2018 12:01:54       T: 08/16/2018 12:04:41     [email or web address]  Job#: [id number]     Doc#: [id number]    CC:

## 2018-08-17 ENCOUNTER — TELEPHONE (OUTPATIENT)
Dept: SURGERY | Age: 83
End: 2018-08-17

## 2018-09-05 ENCOUNTER — OFFICE VISIT (OUTPATIENT)
Dept: SURGERY | Age: 83
End: 2018-09-05

## 2018-09-05 VITALS
TEMPERATURE: 96.4 F | HEART RATE: 68 BPM | DIASTOLIC BLOOD PRESSURE: 60 MMHG | BODY MASS INDEX: 24.54 KG/M2 | HEIGHT: 70 IN | WEIGHT: 171.4 LBS | OXYGEN SATURATION: 95 % | RESPIRATION RATE: 18 BRPM | SYSTOLIC BLOOD PRESSURE: 128 MMHG

## 2018-09-05 DIAGNOSIS — Z98.890 S/P BILATERAL INGUINAL HERNIA REPAIR: Primary | ICD-10-CM

## 2018-09-05 DIAGNOSIS — Z87.19 S/P BILATERAL INGUINAL HERNIA REPAIR: Primary | ICD-10-CM

## 2018-09-05 PROCEDURE — 99024 POSTOP FOLLOW-UP VISIT: CPT | Performed by: NURSE PRACTITIONER

## 2018-09-05 ASSESSMENT — ENCOUNTER SYMPTOMS
VOICE CHANGE: 0
COLOR CHANGE: 0
BACK PAIN: 0
NAUSEA: 0
STRIDOR: 0
COUGH: 0
APNEA: 0
EYE REDNESS: 0
CONSTIPATION: 0
RECTAL PAIN: 0
SINUS PAIN: 0
RHINORRHEA: 0
PHOTOPHOBIA: 0
SORE THROAT: 0
FACIAL SWELLING: 0
ANAL BLEEDING: 0
EYE DISCHARGE: 0
CHEST TIGHTNESS: 0
EYE ITCHING: 0
DIARRHEA: 0
SINUS PRESSURE: 0
BLOOD IN STOOL: 0
CHOKING: 0
SHORTNESS OF BREATH: 0
TROUBLE SWALLOWING: 0
ABDOMINAL DISTENTION: 0
EYE PAIN: 0
VOMITING: 0
WHEEZING: 0

## 2018-09-05 NOTE — PROGRESS NOTES
Relation Age of Onset    Heart Disease Mother     Heart Attack Mother     Diabetes Father     Heart Disease Father     High Blood Pressure Father     Cancer Brother     Diabetes Brother     Heart Disease Brother     Diabetes Brother     Stroke Brother        Social History   Substance Use Topics    Smoking status: Never Smoker    Smokeless tobacco: Never Used    Alcohol use No      Current Outpatient Prescriptions   Medication Sig Dispense Refill    atorvastatin (LIPITOR) 10 MG tablet Take 1 tablet by mouth daily 90 tablet 3    amLODIPine (NORVASC) 2.5 MG tablet Take 1 tablet by mouth daily 90 tablet 3    rivaroxaban (XARELTO) 20 MG TABS tablet Take 1 tablet by mouth daily (with breakfast) 90 tablet 3    flecainide (TAMBOCOR) 100 MG tablet TAKE ONE TABLET BY MOUTH TWICE A  tablet 3    levothyroxine (SYNTHROID) 25 MCG tablet Take 12.5 mcg by mouth Daily      aspirin 81 MG tablet Take 81 mg by mouth daily.  MULTIPLE VITAMIN PO Take by mouth daily        No current facility-administered medications for this visit. Allergies   Allergen Reactions    Prednisone      Raised BP       Subjective:      Review of Systems   Constitutional: Negative for activity change, appetite change, chills, diaphoresis, fatigue, fever and unexpected weight change. HENT: Negative for congestion, dental problem, drooling, ear discharge, ear pain, facial swelling, hearing loss, mouth sores, nosebleeds, postnasal drip, rhinorrhea, sinus pain, sinus pressure, sneezing, sore throat, tinnitus, trouble swallowing and voice change. Eyes: Negative for photophobia, pain, discharge, redness, itching and visual disturbance. Respiratory: Negative for apnea, cough, choking, chest tightness, shortness of breath, wheezing and stridor. Cardiovascular: Negative for chest pain, palpitations and leg swelling. Gastrointestinal: Positive for abdominal pain (left trochar incsiion).  Negative for abdominal distention, not pale, not dry and not cyanotic. Eyes: Lids are normal.   Neck: Trachea normal, normal range of motion and phonation normal. Neck supple. Cardiovascular: Normal rate, regular rhythm, S1 normal, S2 normal, intact distal pulses and normal pulses. Pulmonary/Chest: Effort normal and breath sounds normal. No accessory muscle usage. No apnea, no tachypnea and no bradypnea. No respiratory distress. He has no decreased breath sounds. He has no wheezes. He has no rales. He exhibits no tenderness. Abdominal: Soft. Normal appearance and bowel sounds are normal. He exhibits no distension and no mass. There is tenderness in the left lower quadrant. Musculoskeletal: Normal range of motion. He exhibits no edema or tenderness. Neurological: He is alert and oriented to person, place, and time. He has normal strength and normal reflexes. He displays no atrophy and no tremor. He exhibits normal muscle tone. Coordination and gait normal.   Skin: Skin is warm, dry and intact. No abrasion, no bruising, no burn, no ecchymosis, no laceration, no lesion and no rash noted. No erythema. Psychiatric: He has a normal mood and affect. His speech is normal and behavior is normal. Thought content normal. Cognition and memory are normal.   Vitals reviewed. Patient Active Problem List   Diagnosis    Kidney stone    Stroke (cerebrum) (HCC)    Chronic atrial fibrillation (HCC)    Dizziness    Thoracic aortic aneurysm without rupture ([address]    Coronary artery disease of native artery of native heart with stable angina pectoris ([address]    Non-recurrent bilateral inguinal hernia without obstruction or gangrene     Assessment:      1. Post op check status post robotic repair of bilateral inguinal hernias with mesh    Plan:     1. Incisions healing well without signs of infection  2. Appetite returned  3. Bowel function returned. Stool softeners as needed. 4. Off all pain medications. Okay for tylenol as needed. 5. Activity/lifting restrictions discussed. Continue restrictions for full 6 weeks as discussed. Questions answered. 6. Follow up as needed. Signs and symptoms reviewed with patient that would be concerning and need him to return to office for re-evaluation. Patient states he will call if he has questions or concerns.   7. Follow up with PCP as directed       Electronically signed by HOWIE Piña CNP on 9/8/2018 at 2:25 PM

## 2018-09-08 ASSESSMENT — ENCOUNTER SYMPTOMS: ABDOMINAL PAIN: 1

## 2018-10-29 ENCOUNTER — HOSPITAL ENCOUNTER (OUTPATIENT)
Age: 83
Discharge: HOME | End: 2018-10-29
Payer: MEDICARE

## 2018-10-29 ENCOUNTER — HOSPITAL ENCOUNTER (OUTPATIENT)
Dept: GENERAL RADIOLOGY | Age: 83
Discharge: HOME | End: 2018-10-29
Payer: MEDICARE

## 2018-10-29 DIAGNOSIS — M25.512 LEFT SHOULDER PAIN, UNSPECIFIED CHRONICITY: ICD-10-CM

## 2018-10-29 PROCEDURE — 73030 X-RAY EXAM OF SHOULDER: CPT

## 2018-11-01 ENCOUNTER — HOSPITAL ENCOUNTER (OUTPATIENT)
Dept: MRI IMAGING | Age: 83
Discharge: HOME | End: 2018-11-01
Payer: MEDICARE

## 2018-11-01 DIAGNOSIS — M25.512 LEFT SHOULDER PAIN, UNSPECIFIED CHRONICITY: ICD-10-CM

## 2018-11-01 PROCEDURE — 73221 MRI JOINT UPR EXTREM W/O DYE: CPT

## 2018-12-26 RX ORDER — FLECAINIDE ACETATE 100 MG/1
TABLET ORAL
Qty: 180 TABLET | Refills: 3 | Status: SHIPPED | OUTPATIENT
Start: 2018-12-26 | End: 2020-01-31 | Stop reason: SDUPTHER

## 2019-01-14 RX ORDER — ATORVASTATIN CALCIUM 10 MG/1
TABLET, FILM COATED ORAL
Qty: 90 TABLET | Refills: 2 | Status: SHIPPED | OUTPATIENT
Start: 2019-01-14 | End: 2019-11-08 | Stop reason: SDUPTHER

## 2019-01-14 RX ORDER — AMLODIPINE BESYLATE 2.5 MG/1
TABLET ORAL
Qty: 90 TABLET | Refills: 2 | Status: SHIPPED | OUTPATIENT
Start: 2019-01-14 | End: 2019-04-17

## 2019-02-06 RX ORDER — RIVAROXABAN 20 MG/1
TABLET, FILM COATED ORAL
Qty: 90 TABLET | Refills: 3 | Status: SHIPPED | OUTPATIENT
Start: 2019-02-06 | End: 2020-01-31 | Stop reason: SDUPTHER

## 2019-03-29 ENCOUNTER — OFFICE VISIT (OUTPATIENT)
Dept: ENT CLINIC | Age: 84
End: 2019-03-29
Payer: MEDICARE

## 2019-03-29 VITALS
HEART RATE: 60 BPM | SYSTOLIC BLOOD PRESSURE: 118 MMHG | RESPIRATION RATE: 14 BRPM | WEIGHT: 177 LBS | TEMPERATURE: 98 F | DIASTOLIC BLOOD PRESSURE: 70 MMHG | BODY MASS INDEX: 25.34 KG/M2 | HEIGHT: 70 IN

## 2019-03-29 DIAGNOSIS — R04.0 EPISTAXIS: Primary | ICD-10-CM

## 2019-03-29 DIAGNOSIS — H61.23 BILATERAL IMPACTED CERUMEN: ICD-10-CM

## 2019-03-29 PROCEDURE — G8419 CALC BMI OUT NRM PARAM NOF/U: HCPCS | Performed by: OTOLARYNGOLOGY

## 2019-03-29 PROCEDURE — G8427 DOCREV CUR MEDS BY ELIG CLIN: HCPCS | Performed by: OTOLARYNGOLOGY

## 2019-03-29 PROCEDURE — G8598 ASA/ANTIPLAT THER USED: HCPCS | Performed by: OTOLARYNGOLOGY

## 2019-03-29 PROCEDURE — 1123F ACP DISCUSS/DSCN MKR DOCD: CPT | Performed by: OTOLARYNGOLOGY

## 2019-03-29 PROCEDURE — G8484 FLU IMMUNIZE NO ADMIN: HCPCS | Performed by: OTOLARYNGOLOGY

## 2019-03-29 PROCEDURE — 1036F TOBACCO NON-USER: CPT | Performed by: OTOLARYNGOLOGY

## 2019-03-29 PROCEDURE — 4040F PNEUMOC VAC/ADMIN/RCVD: CPT | Performed by: OTOLARYNGOLOGY

## 2019-03-29 PROCEDURE — 99213 OFFICE O/P EST LOW 20 MIN: CPT | Performed by: OTOLARYNGOLOGY

## 2019-04-05 ENCOUNTER — OFFICE VISIT (OUTPATIENT)
Dept: ENT CLINIC | Age: 84
End: 2019-04-05
Payer: MEDICARE

## 2019-04-05 VITALS
WEIGHT: 175.5 LBS | HEART RATE: 74 BPM | BODY MASS INDEX: 25.13 KG/M2 | SYSTOLIC BLOOD PRESSURE: 132 MMHG | RESPIRATION RATE: 12 BRPM | DIASTOLIC BLOOD PRESSURE: 82 MMHG | HEIGHT: 70 IN | TEMPERATURE: 98.2 F

## 2019-04-05 DIAGNOSIS — H61.23 BILATERAL IMPACTED CERUMEN: Primary | ICD-10-CM

## 2019-04-05 DIAGNOSIS — R04.0 EPISTAXIS: ICD-10-CM

## 2019-04-05 PROCEDURE — 1123F ACP DISCUSS/DSCN MKR DOCD: CPT | Performed by: OTOLARYNGOLOGY

## 2019-04-05 PROCEDURE — G8419 CALC BMI OUT NRM PARAM NOF/U: HCPCS | Performed by: OTOLARYNGOLOGY

## 2019-04-05 PROCEDURE — G8427 DOCREV CUR MEDS BY ELIG CLIN: HCPCS | Performed by: OTOLARYNGOLOGY

## 2019-04-05 PROCEDURE — G8598 ASA/ANTIPLAT THER USED: HCPCS | Performed by: OTOLARYNGOLOGY

## 2019-04-05 PROCEDURE — 4040F PNEUMOC VAC/ADMIN/RCVD: CPT | Performed by: OTOLARYNGOLOGY

## 2019-04-05 PROCEDURE — 69210 REMOVE IMPACTED EAR WAX UNI: CPT | Performed by: OTOLARYNGOLOGY

## 2019-04-05 PROCEDURE — 99213 OFFICE O/P EST LOW 20 MIN: CPT | Performed by: OTOLARYNGOLOGY

## 2019-04-05 PROCEDURE — 1036F TOBACCO NON-USER: CPT | Performed by: OTOLARYNGOLOGY

## 2019-04-05 ASSESSMENT — ENCOUNTER SYMPTOMS
RHINORRHEA: 0
SORE THROAT: 0
SINUS PAIN: 0
VOICE CHANGE: 0
VOMITING: 0
NAUSEA: 0
COUGH: 0
SINUS PRESSURE: 0
SHORTNESS OF BREATH: 0
TROUBLE SWALLOWING: 0
FACIAL SWELLING: 0
STRIDOR: 0

## 2019-04-05 NOTE — PROGRESS NOTES
History:   Diagnosis Date    A-fib ([address]     Bronchitis     CAD (coronary artery disease)     atrial fib     Chronic kidney disease     kidney stones     Hypertension     Stroke (cerebrum) ([address]     Thyroid disease     Trigeminal neuralgia       Past Surgical History:   Procedure Laterality Date    APPENDECTOMY  1951    BRAIN SURGERY  2003    CHOLECYSTECTOMY      COLONOSCOPY  2013    GALLBLADDER SURGERY  2010    KIDNEY STONE SURGERY  2003    HI LAP,INGUINAL HERNIA REPR,INITIAL Bilateral 8/16/2018    robotic bilateral inguinal hernia repair with mesh performed by Marly Hicks MD at [address] [place] [address]  2003     Family History   Problem Relation Age of Onset    Heart Disease Mother     Heart Attack Mother     Diabetes Father     Heart Disease Father     High Blood Pressure Father     Cancer Brother     Diabetes Brother     Heart Disease Brother     Diabetes Brother     Stroke Brother      Social History     Tobacco Use    Smoking status: Never Smoker    Smokeless tobacco: Never Used   Substance Use Topics    Alcohol use: No       Subjective:      Review of Systems   Constitutional: Negative. HENT: Positive for hearing loss. Negative for congestion, ear discharge, ear pain, facial swelling, mouth sores, nosebleeds, postnasal drip, rhinorrhea, sinus pressure, sinus pain, sneezing, sore throat, tinnitus, trouble swallowing and voice change. Eyes: Negative for visual disturbance. Respiratory: Negative for cough, shortness of breath and stridor. Cardiovascular: Negative for chest pain. Gastrointestinal: Negative for nausea and vomiting. Musculoskeletal: Negative for neck pain. Skin: Negative for wound. Allergic/Immunologic: Negative for environmental allergies. Neurological: Negative for dizziness. All other systems reviewed and are negative. Rest of review of systems are negative, except as noted in HPI.      Objective:   /82 (Site: Left Upper Arm, Position:

## 2019-04-17 ENCOUNTER — OFFICE VISIT (OUTPATIENT)
Dept: FAMILY MEDICINE CLINIC | Age: 84
End: 2019-04-17
Payer: MEDICARE

## 2019-04-17 VITALS
WEIGHT: 177 LBS | BODY MASS INDEX: 25.34 KG/M2 | SYSTOLIC BLOOD PRESSURE: 138 MMHG | RESPIRATION RATE: 12 BRPM | HEART RATE: 68 BPM | OXYGEN SATURATION: 93 % | HEIGHT: 70 IN | DIASTOLIC BLOOD PRESSURE: 66 MMHG

## 2019-04-17 DIAGNOSIS — I63.9 CEREBROVASCULAR ACCIDENT (CVA), UNSPECIFIED MECHANISM (HCC): ICD-10-CM

## 2019-04-17 DIAGNOSIS — I10 ESSENTIAL HYPERTENSION: ICD-10-CM

## 2019-04-17 DIAGNOSIS — I71.20 THORACIC AORTIC ANEURYSM WITHOUT RUPTURE: ICD-10-CM

## 2019-04-17 DIAGNOSIS — I25.118 CORONARY ARTERY DISEASE OF NATIVE ARTERY OF NATIVE HEART WITH STABLE ANGINA PECTORIS (HCC): ICD-10-CM

## 2019-04-17 DIAGNOSIS — I48.20 CHRONIC ATRIAL FIBRILLATION (HCC): Primary | ICD-10-CM

## 2019-04-17 PROCEDURE — G8419 CALC BMI OUT NRM PARAM NOF/U: HCPCS | Performed by: FAMILY MEDICINE

## 2019-04-17 PROCEDURE — G8427 DOCREV CUR MEDS BY ELIG CLIN: HCPCS | Performed by: FAMILY MEDICINE

## 2019-04-17 PROCEDURE — 4040F PNEUMOC VAC/ADMIN/RCVD: CPT | Performed by: FAMILY MEDICINE

## 2019-04-17 PROCEDURE — 1036F TOBACCO NON-USER: CPT | Performed by: FAMILY MEDICINE

## 2019-04-17 PROCEDURE — 99204 OFFICE O/P NEW MOD 45 MIN: CPT | Performed by: FAMILY MEDICINE

## 2019-04-17 PROCEDURE — 1123F ACP DISCUSS/DSCN MKR DOCD: CPT | Performed by: FAMILY MEDICINE

## 2019-04-17 PROCEDURE — G8598 ASA/ANTIPLAT THER USED: HCPCS | Performed by: FAMILY MEDICINE

## 2019-04-17 RX ORDER — AMLODIPINE BESYLATE 5 MG/1
5 TABLET ORAL DAILY
Qty: 90 TABLET | Refills: 3 | Status: SHIPPED | OUTPATIENT
Start: 2019-04-17 | End: 2019-10-16

## 2019-04-17 ASSESSMENT — PATIENT HEALTH QUESTIONNAIRE - PHQ9
SUM OF ALL RESPONSES TO PHQ9 QUESTIONS 1 & 2: 0
1. LITTLE INTEREST OR PLEASURE IN DOING THINGS: 0
SUM OF ALL RESPONSES TO PHQ QUESTIONS 1-9: 0
2. FEELING DOWN, DEPRESSED OR HOPELESS: 0
SUM OF ALL RESPONSES TO PHQ QUESTIONS 1-9: 0

## 2019-04-17 ASSESSMENT — ENCOUNTER SYMPTOMS
BACK PAIN: 0
RHINORRHEA: 0
ABDOMINAL PAIN: 0
SORE THROAT: 0
CONSTIPATION: 0
APNEA: 0
EYE REDNESS: 0
DIARRHEA: 0
COUGH: 0
SHORTNESS OF BREATH: 0
BLOOD IN STOOL: 0
NAUSEA: 0
EYE DISCHARGE: 0
SINUS PRESSURE: 0
WHEEZING: 0
VOMITING: 0
COLOR CHANGE: 0

## 2019-04-17 NOTE — PROGRESS NOTES
Keon Evans  [address] [place] [address]  Dept: [phone]  Dept Fax: [phone]  Loc: [phone]    Nilesh Renae is a 80 y.o. male who presents today for his medical conditions/complaints as noted below. Chief Complaint   Patient presents with    New Patient           HPI:     Patient presents to establish care and review chronic medical conditions. He has history of chronic atrial fibrillation, hypertension, history of stroke, thoracic aortic aneurysm, and coronary artery disease. He does follow up with cardiology. He is on anticoagulation and rate controlled for his A. fib. He does complain of nosebleeds which have occurred a few times over the past few weeks related to his anticoagulation, but states they have been able to be stopped after cauterization by ENT. Blood pressure has been slightly elevated and he mentions that he has seen it [id number] systolic over the past few weeks. He does have a history of stroke but denies any weakness or symptoms with that. Also has a 4 cm thoracic aortic aneurysm but mentions no one has checked this that he is aware of. He is a nonsmoker. Otherwise he's feeling well today. Does have annual labs done at the health [facility].       Past Medical History:   Diagnosis Date    [facility]-fib [facility])     Bronchitis     CAD (coronary artery disease)     atrial fib     Chronic kidney disease     kidney stones     Hypertension     [facility]um) [facility]     Thyroid disease     Trigeminal neuralgia       Past Surgical History:   Procedure Laterality Date    APPENDECTOMY  1951    BRAIN SURGERY  2003    CHOLECYSTECTOMY      COLONOSCOPY  2013    GALLBLADDER SURGERY  2010    KIDNEY STONE SURGERY  2003    AL LAP,INGUINAL HERNIA REPR,INITIAL Bilateral 8/16/2018    robotic bilateral inguinal hernia repair with mesh performed by Mayela Barkley MD at [address] [place] [address]  2003 Family History   Problem Relation Age of Onset    Heart Disease Mother     Heart Attack Mother     Diabetes Father     Heart Disease Father     High Blood Pressure Father     Cancer Brother     Diabetes Brother     Heart Disease Brother     Diabetes Brother     Stroke Brother        Social History     Tobacco Use    Smoking status: Never Smoker    Smokeless tobacco: Never Used   Substance Use Topics    Alcohol use: No      Current Outpatient Medications   Medication Sig Dispense Refill    amLODIPine (NORVASC) 5 MG tablet Take 1 tablet by mouth daily 90 tablet 3    UNKNOWN TO PATIENT Saline nasal spray OTC      XARELTO 20 MG TABS tablet TAKE ONE TABLET BY MOUTH EVERY DAY WITH BREAKFAST 90 tablet 3    atorvastatin (LIPITOR) 10 MG tablet TAKE ONE TABLET BY MOUTH EVERY DAY 90 tablet 2    flecainide (TAMBOCOR) 100 MG tablet TAKE ONE TABLET BY MOUTH TWICE A  tablet 3    levothyroxine (SYNTHROID) 25 MCG tablet Take 12.5 mcg by mouth Daily      MULTIPLE VITAMIN PO Take by mouth daily        No current facility-administered medications for this visit. Allergies   Allergen Reactions    Prednisone      Raised BP       Health Maintenance   Topic Date Due    DTaP/Tdap/Td vaccine (1 - Tdap) 02/05/1953    Shingles Vaccine (1 of 2) 02/05/1984    Pneumococcal 65+ years Vaccine (1 of 2 - PCV13) 02/05/1999    Flu vaccine (Season Ended) 09/01/2019       Subjective:      Review of Systems   Constitutional: Negative for appetite change, chills, fatigue and fever. HENT: Positive for nosebleeds. Negative for congestion, hearing loss, postnasal drip, rhinorrhea, sinus pressure, sore throat and tinnitus. Eyes: Negative for discharge, redness and visual disturbance. Respiratory: Negative for apnea, cough, shortness of breath and wheezing. Cardiovascular: Negative for chest pain, palpitations and leg swelling.    Gastrointestinal: Negative for abdominal pain, blood in stool, constipation, diarrhea, nausea and vomiting. Endocrine: Negative for polyuria. Genitourinary: Negative for difficulty urinating, dysuria, frequency, hematuria and urgency. Musculoskeletal: Negative for arthralgias, back pain, joint swelling, myalgias and neck stiffness. Skin: Negative for color change and rash. Neurological: Negative for dizziness, tremors, seizures, syncope, light-headedness, numbness and headaches. Psychiatric/Behavioral: Positive for sleep disturbance. Negative for decreased concentration and dysphoric mood. The patient is not nervous/anxious. Objective:     Physical Exam   Constitutional: He is oriented to person, place, and time. He appears well-developed and well-nourished. No distress. HENT:   Head: Normocephalic and atraumatic. Right Ear: Hearing, tympanic membrane, external ear and ear canal normal.   Left Ear: Hearing, tympanic membrane, external ear and ear canal normal.   Nose: Right sinus exhibits no maxillary sinus tenderness and no frontal sinus tenderness. Left sinus exhibits no maxillary sinus tenderness and no frontal sinus tenderness. Mouth/Throat: Oropharynx is clear and moist and mucous membranes are normal. No oropharyngeal exudate, posterior oropharyngeal edema or posterior oropharyngeal erythema. Eyes: Pupils are equal, round, and reactive to light. Conjunctivae and EOM are normal. Right eye exhibits no discharge. Left eye exhibits no discharge. No scleral icterus. Neck: Normal range of motion. Neck supple. Cardiovascular: Normal rate, normal heart sounds and intact distal pulses. An irregularly irregular rhythm present. Pulmonary/Chest: Effort normal and breath sounds normal. No respiratory distress. He has no wheezes. Abdominal: Soft. Bowel sounds are normal. He exhibits no distension. There is no tenderness. Musculoskeletal: Normal range of motion. He exhibits no edema or tenderness. Lymphadenopathy:     He has no cervical adenopathy. Neurological: He is alert and oriented to person, place, and time. He exhibits normal muscle tone. Coordination normal.   Skin: Skin is warm and dry. No rash noted. Psychiatric: He has a normal mood and affect. His behavior is normal. Judgment and thought content normal.   Nursing note and vitals reviewed. /66 (Site: Left Upper Arm, Position: Sitting, Cuff Size: Medium Adult)   Pulse 68   Resp 12   Ht 5' 10\" (1.778 m)   Wt 177 lb (80.3 kg)   SpO2 93%   BMI 25.40 kg/m²     Assessment:       Diagnosis Orders   1. Chronic atrial fibrillation ([facility]     2. Coronary artery disease of native artery of native heart with stable angina pectoris ([address]     3. Thoracic aortic aneurysm without rupture (HCC)  amLODIPine (NORVASC) 5 MG tablet    CTA CHEST W CONTRAST   4. Essential hypertension  amLODIPine (NORVASC) 5 MG tablet   5. Cerebrovascular accident (CVA), unspecified mechanism ([address]         Plan:     CAF- anticoag/rate control  CAD- on statin, follows with cardio  Aneurysm- will recheck CT in fall and follow up afterwards  HTN- has been running a little higher, will increase norvasc to 5 mg daily. Hx of CVA- anticoag, statin, bp control  Fasting labs at [facility] 5/11/19    Discussed use, benefit, and side effects of prescribed medications. Barriers tomedication compliance addressed. All patient questions answered. Pt voiced understanding. Return in about 6 months (around 10/17/2019) for follow up, HTN. Orders Placed This Encounter   Procedures    CTA CHEST W CONTRAST     Standing Status:   Future     Standing Expiration Date:   4/17/2020     Scheduling Instructions:       To be completed Fall of 2019     Orders Placed This Encounter   Medications    amLODIPine (NORVASC) 5 MG tablet     Sig: Take 1 tablet by mouth daily     Dispense:  90 tablet     Refill:  3        Reccommended tobacco cessation options including pharmacologicmethods, counseled great than 3 minutes during this visit:  Yes []  No  []     Patient given educational materials - see patient instructions. Discussed use, benefit, and sideeffects of prescribed medications. All patient questions answered. Pt voiced understanding. Reviewed health maintenance. Instructed to continue current medications, diet and exercise. Patient agreed with treatment plan. Follow up as directed.      Electronically signed by Stephy Freeman MD on 4/17/2019 at 3:00 PM

## 2019-05-11 LAB
CHOLESTEROL, TOTAL: 146 MG/DL
CHOLESTEROL/HDL RATIO: NORMAL
HDLC SERPL-MCNC: 52 MG/DL (ref 35–70)
LDL CHOLESTEROL CALCULATED: 72 MG/DL (ref 0–160)
PROSTATE SPECIFIC ANTIGEN: 1.16 NG/ML
TRIGL SERPL-MCNC: 109 MG/DL
VLDLC SERPL CALC-MCNC: 22 MG/DL

## 2019-07-17 ENCOUNTER — OFFICE VISIT (OUTPATIENT)
Dept: CARDIOLOGY CLINIC | Age: 84
End: 2019-07-17
Payer: MEDICARE

## 2019-07-17 VITALS
BODY MASS INDEX: 29.32 KG/M2 | WEIGHT: 176 LBS | HEART RATE: 67 BPM | DIASTOLIC BLOOD PRESSURE: 70 MMHG | SYSTOLIC BLOOD PRESSURE: 136 MMHG | HEIGHT: 65 IN

## 2019-07-17 DIAGNOSIS — I48.20 CHRONIC ATRIAL FIBRILLATION (HCC): ICD-10-CM

## 2019-07-17 DIAGNOSIS — Q24.8 ABNORMAL CARDIAC VALVE: ICD-10-CM

## 2019-07-17 DIAGNOSIS — I25.118 CORONARY ARTERY DISEASE OF NATIVE ARTERY OF NATIVE HEART WITH STABLE ANGINA PECTORIS (HCC): Primary | ICD-10-CM

## 2019-07-17 DIAGNOSIS — I10 ESSENTIAL HYPERTENSION: ICD-10-CM

## 2019-07-17 PROCEDURE — 4040F PNEUMOC VAC/ADMIN/RCVD: CPT | Performed by: NUCLEAR MEDICINE

## 2019-07-17 PROCEDURE — 1036F TOBACCO NON-USER: CPT | Performed by: NUCLEAR MEDICINE

## 2019-07-17 PROCEDURE — 93000 ELECTROCARDIOGRAM COMPLETE: CPT | Performed by: NUCLEAR MEDICINE

## 2019-07-17 PROCEDURE — 1123F ACP DISCUSS/DSCN MKR DOCD: CPT | Performed by: NUCLEAR MEDICINE

## 2019-07-17 PROCEDURE — G8419 CALC BMI OUT NRM PARAM NOF/U: HCPCS | Performed by: NUCLEAR MEDICINE

## 2019-07-17 PROCEDURE — G8427 DOCREV CUR MEDS BY ELIG CLIN: HCPCS | Performed by: NUCLEAR MEDICINE

## 2019-07-17 PROCEDURE — 99214 OFFICE O/P EST MOD 30 MIN: CPT | Performed by: NUCLEAR MEDICINE

## 2019-07-17 PROCEDURE — G8598 ASA/ANTIPLAT THER USED: HCPCS | Performed by: NUCLEAR MEDICINE

## 2019-07-24 ENCOUNTER — HOSPITAL ENCOUNTER (OUTPATIENT)
Dept: NON INVASIVE DIAGNOSTICS | Age: 84
Discharge: HOME | End: 2019-07-24
Payer: MEDICARE

## 2019-07-24 DIAGNOSIS — I10 ESSENTIAL HYPERTENSION: ICD-10-CM

## 2019-07-24 DIAGNOSIS — I25.118 CORONARY ARTERY DISEASE OF NATIVE ARTERY OF NATIVE HEART WITH STABLE ANGINA PECTORIS (HCC): ICD-10-CM

## 2019-07-24 DIAGNOSIS — Q24.8 ABNORMAL CARDIAC VALVE: ICD-10-CM

## 2019-07-24 DIAGNOSIS — I48.20 CHRONIC ATRIAL FIBRILLATION (HCC): ICD-10-CM

## 2019-07-24 LAB
LV EF: 55 %
LVEF MODALITY: NORMAL

## 2019-07-24 PROCEDURE — 93306 TTE W/DOPPLER COMPLETE: CPT

## 2019-07-29 ENCOUNTER — HOSPITAL ENCOUNTER (INPATIENT)
Age: 84
LOS: 1 days | Discharge: HOME | DRG: 149 | End: 2019-07-30
Attending: INTERNAL MEDICINE | Admitting: INTERNAL MEDICINE
Payer: MEDICARE

## 2019-07-29 ENCOUNTER — OFFICE VISIT (OUTPATIENT)
Dept: CARDIOLOGY CLINIC | Age: 84
End: 2019-07-29
Payer: MEDICARE

## 2019-07-29 ENCOUNTER — APPOINTMENT (OUTPATIENT)
Dept: CT IMAGING | Age: 84
DRG: 149 | End: 2019-07-29
Payer: MEDICARE

## 2019-07-29 ENCOUNTER — APPOINTMENT (OUTPATIENT)
Dept: GENERAL RADIOLOGY | Age: 84
DRG: 149 | End: 2019-07-29
Payer: MEDICARE

## 2019-07-29 VITALS — OXYGEN SATURATION: 97 % | SYSTOLIC BLOOD PRESSURE: 190 MMHG | HEART RATE: 72 BPM | DIASTOLIC BLOOD PRESSURE: 88 MMHG

## 2019-07-29 DIAGNOSIS — R42 DIZZINESS: ICD-10-CM

## 2019-07-29 DIAGNOSIS — I71.20 THORACIC AORTIC ANEURYSM WITHOUT RUPTURE: ICD-10-CM

## 2019-07-29 DIAGNOSIS — D68.9 COAGULOPATHY (HCC): ICD-10-CM

## 2019-07-29 DIAGNOSIS — R07.9 CHEST PAIN, UNSPECIFIED TYPE: Primary | ICD-10-CM

## 2019-07-29 DIAGNOSIS — I48.20 CHRONIC ATRIAL FIBRILLATION (HCC): Primary | ICD-10-CM

## 2019-07-29 LAB
ALBUMIN SERPL-MCNC: 4.3 G/DL (ref 3.5–5.1)
ALP BLD-CCNC: 92 U/L (ref 38–126)
ALT SERPL-CCNC: 16 U/L (ref 11–66)
ANION GAP SERPL CALCULATED.3IONS-SCNC: 13 MEQ/L (ref 8–16)
APTT: 44.4 SECONDS (ref 22–38)
AST SERPL-CCNC: 21 U/L (ref 5–40)
BASOPHILS # BLD: 0.4 %
BASOPHILS ABSOLUTE: 0 THOU/MM3 (ref 0–0.1)
BILIRUB SERPL-MCNC: 0.5 MG/DL (ref 0.3–1.2)
BILIRUBIN DIRECT: < 0.2 MG/DL (ref 0–0.3)
BILIRUBIN URINE: NEGATIVE
BLOOD, URINE: NEGATIVE
BUN BLDV-MCNC: 12 MG/DL (ref 7–22)
CALCIUM SERPL-MCNC: 9.4 MG/DL (ref 8.5–10.5)
CHARACTER, URINE: CLEAR
CHLORIDE BLD-SCNC: 106 MEQ/L (ref 98–111)
CO2: 26 MEQ/L (ref 23–33)
COLOR: YELLOW
CREAT SERPL-MCNC: 0.9 MG/DL (ref 0.4–1.2)
EKG ATRIAL RATE: 66 BPM
EKG P AXIS: 24 DEGREES
EKG P-R INTERVAL: 252 MS
EKG Q-T INTERVAL: 412 MS
EKG QRS DURATION: 116 MS
EKG QTC CALCULATION (BAZETT): 431 MS
EKG R AXIS: -29 DEGREES
EKG T AXIS: 85 DEGREES
EKG VENTRICULAR RATE: 66 BPM
EOSINOPHIL # BLD: 1.3 %
EOSINOPHILS ABSOLUTE: 0.1 THOU/MM3 (ref 0–0.4)
ERYTHROCYTE [DISTWIDTH] IN BLOOD BY AUTOMATED COUNT: 11.9 % (ref 11.5–14.5)
ERYTHROCYTE [DISTWIDTH] IN BLOOD BY AUTOMATED COUNT: 43.6 FL (ref 35–45)
GFR SERPL CREATININE-BSD FRML MDRD: 80 ML/MIN/1.73M2
GLUCOSE BLD-MCNC: 103 MG/DL (ref 70–108)
GLUCOSE URINE: NEGATIVE MG/DL
HCT VFR BLD CALC: 46.3 % (ref 42–52)
HEMOGLOBIN: 15.7 GM/DL (ref 14–18)
IMMATURE GRANS (ABS): 0.02 THOU/MM3 (ref 0–0.07)
IMMATURE GRANULOCYTES: 0.4 %
INR BLD: 1.42 (ref 0.85–1.13)
KETONES, URINE: ABNORMAL
LEUKOCYTE ESTERASE, URINE: NEGATIVE
LYMPHOCYTES # BLD: 26.2 %
LYMPHOCYTES ABSOLUTE: 1.4 THOU/MM3 (ref 1–4.8)
MAGNESIUM: 2.1 MG/DL (ref 1.6–2.4)
MCH RBC QN AUTO: 33.9 PG (ref 26–33)
MCHC RBC AUTO-ENTMCNC: 33.9 GM/DL (ref 32.2–35.5)
MCV RBC AUTO: 100 FL (ref 80–94)
MONOCYTES # BLD: 11 %
MONOCYTES ABSOLUTE: 0.6 THOU/MM3 (ref 0.4–1.3)
NITRITE, URINE: NEGATIVE
NUCLEATED RED BLOOD CELLS: 0 /100 WBC
OSMOLALITY CALCULATION: 288.7 MOSMOL/KG (ref 275–300)
PH UA: 7.5 (ref 5–9)
PLATELET # BLD: 212 THOU/MM3 (ref 130–400)
PMV BLD AUTO: 9.6 FL (ref 9.4–12.4)
POTASSIUM SERPL-SCNC: 4.4 MEQ/L (ref 3.5–5.2)
PRO-BNP: 18.9 PG/ML (ref 0–1800)
PROTEIN UA: NEGATIVE
RBC # BLD: 4.63 MILL/MM3 (ref 4.7–6.1)
REASON FOR REJECTION: NORMAL
REJECTED TEST: NORMAL
SEG NEUTROPHILS: 60.7 %
SEGMENTED NEUTROPHILS ABSOLUTE COUNT: 3.3 THOU/MM3 (ref 1.8–7.7)
SODIUM BLD-SCNC: 145 MEQ/L (ref 135–145)
SPECIFIC GRAVITY, URINE: 1.03 (ref 1–1.03)
TOTAL PROTEIN: 7.1 G/DL (ref 6.1–8)
TROPONIN T: < 0.01 NG/ML
UROBILINOGEN, URINE: 0.2 EU/DL (ref 0–1)
WBC # BLD: 5.5 THOU/MM3 (ref 4.8–10.8)

## 2019-07-29 PROCEDURE — 99213 OFFICE O/P EST LOW 20 MIN: CPT | Performed by: INTERNAL MEDICINE

## 2019-07-29 PROCEDURE — 84484 ASSAY OF TROPONIN QUANT: CPT

## 2019-07-29 PROCEDURE — 70498 CT ANGIOGRAPHY NECK: CPT

## 2019-07-29 PROCEDURE — 4040F PNEUMOC VAC/ADMIN/RCVD: CPT | Performed by: INTERNAL MEDICINE

## 2019-07-29 PROCEDURE — 2709999900 HC NON-CHARGEABLE SUPPLY

## 2019-07-29 PROCEDURE — 81003 URINALYSIS AUTO W/O SCOPE: CPT

## 2019-07-29 PROCEDURE — G8427 DOCREV CUR MEDS BY ELIG CLIN: HCPCS | Performed by: INTERNAL MEDICINE

## 2019-07-29 PROCEDURE — 85730 THROMBOPLASTIN TIME PARTIAL: CPT

## 2019-07-29 PROCEDURE — 93010 ELECTROCARDIOGRAM REPORT: CPT | Performed by: INTERNAL MEDICINE

## 2019-07-29 PROCEDURE — G8420 CALC BMI NORM PARAMETERS: HCPCS | Performed by: INTERNAL MEDICINE

## 2019-07-29 PROCEDURE — 83880 ASSAY OF NATRIURETIC PEPTIDE: CPT

## 2019-07-29 PROCEDURE — G0378 HOSPITAL OBSERVATION PER HR: HCPCS

## 2019-07-29 PROCEDURE — 2580000003 HC RX 258: Performed by: PHYSICIAN ASSISTANT

## 2019-07-29 PROCEDURE — 70496 CT ANGIOGRAPHY HEAD: CPT

## 2019-07-29 PROCEDURE — 1123F ACP DISCUSS/DSCN MKR DOCD: CPT | Performed by: INTERNAL MEDICINE

## 2019-07-29 PROCEDURE — 93005 ELECTROCARDIOGRAM TRACING: CPT | Performed by: PHYSICIAN ASSISTANT

## 2019-07-29 PROCEDURE — 1200000003 HC TELEMETRY R&B

## 2019-07-29 PROCEDURE — 93000 ELECTROCARDIOGRAM COMPLETE: CPT | Performed by: INTERNAL MEDICINE

## 2019-07-29 PROCEDURE — 6370000000 HC RX 637 (ALT 250 FOR IP): Performed by: INTERNAL MEDICINE

## 2019-07-29 PROCEDURE — 36415 COLL VENOUS BLD VENIPUNCTURE: CPT

## 2019-07-29 PROCEDURE — 1036F TOBACCO NON-USER: CPT | Performed by: INTERNAL MEDICINE

## 2019-07-29 PROCEDURE — 71045 X-RAY EXAM CHEST 1 VIEW: CPT

## 2019-07-29 PROCEDURE — 2580000003 HC RX 258: Performed by: INTERNAL MEDICINE

## 2019-07-29 PROCEDURE — 80048 BASIC METABOLIC PNL TOTAL CA: CPT

## 2019-07-29 PROCEDURE — 6360000004 HC RX CONTRAST MEDICATION: Performed by: PHYSICIAN ASSISTANT

## 2019-07-29 PROCEDURE — 83735 ASSAY OF MAGNESIUM: CPT

## 2019-07-29 PROCEDURE — 99285 EMERGENCY DEPT VISIT HI MDM: CPT

## 2019-07-29 PROCEDURE — 85610 PROTHROMBIN TIME: CPT

## 2019-07-29 PROCEDURE — 80076 HEPATIC FUNCTION PANEL: CPT

## 2019-07-29 PROCEDURE — 70450 CT HEAD/BRAIN W/O DYE: CPT

## 2019-07-29 PROCEDURE — G8598 ASA/ANTIPLAT THER USED: HCPCS | Performed by: INTERNAL MEDICINE

## 2019-07-29 PROCEDURE — 85025 COMPLETE CBC W/AUTO DIFF WBC: CPT

## 2019-07-29 PROCEDURE — 99223 1ST HOSP IP/OBS HIGH 75: CPT | Performed by: INTERNAL MEDICINE

## 2019-07-29 RX ORDER — ATORVASTATIN CALCIUM 10 MG/1
10 TABLET, FILM COATED ORAL DAILY
Status: DISCONTINUED | OUTPATIENT
Start: 2019-07-30 | End: 2019-07-30 | Stop reason: HOSPADM

## 2019-07-29 RX ORDER — SODIUM CHLORIDE 0.9 % (FLUSH) 0.9 %
10 SYRINGE (ML) INJECTION PRN
Status: DISCONTINUED | OUTPATIENT
Start: 2019-07-29 | End: 2019-07-30 | Stop reason: HOSPADM

## 2019-07-29 RX ORDER — MULTIVITAMIN WITH FOLIC ACID 400 MCG
TABLET ORAL DAILY
Status: DISCONTINUED | OUTPATIENT
Start: 2019-07-30 | End: 2019-07-30 | Stop reason: HOSPADM

## 2019-07-29 RX ORDER — ACETAMINOPHEN 325 MG/1
650 TABLET ORAL EVERY 4 HOURS PRN
Status: DISCONTINUED | OUTPATIENT
Start: 2019-07-29 | End: 2019-07-30 | Stop reason: HOSPADM

## 2019-07-29 RX ORDER — LEVOTHYROXINE SODIUM 0.03 MG/1
12.5 TABLET ORAL DAILY
Status: DISCONTINUED | OUTPATIENT
Start: 2019-07-30 | End: 2019-07-30 | Stop reason: HOSPADM

## 2019-07-29 RX ORDER — AMLODIPINE BESYLATE 5 MG/1
5 TABLET ORAL DAILY
Status: DISCONTINUED | OUTPATIENT
Start: 2019-07-30 | End: 2019-07-30 | Stop reason: HOSPADM

## 2019-07-29 RX ORDER — FLECAINIDE ACETATE 50 MG/1
100 TABLET ORAL 2 TIMES DAILY
Status: DISCONTINUED | OUTPATIENT
Start: 2019-07-29 | End: 2019-07-30 | Stop reason: HOSPADM

## 2019-07-29 RX ORDER — HYDROCODONE BITARTRATE AND ACETAMINOPHEN 5; 325 MG/1; MG/1
2 TABLET ORAL EVERY 4 HOURS PRN
Status: DISCONTINUED | OUTPATIENT
Start: 2019-07-29 | End: 2019-07-30 | Stop reason: HOSPADM

## 2019-07-29 RX ORDER — ONDANSETRON 2 MG/ML
4 INJECTION INTRAMUSCULAR; INTRAVENOUS EVERY 6 HOURS PRN
Status: DISCONTINUED | OUTPATIENT
Start: 2019-07-29 | End: 2019-07-30 | Stop reason: HOSPADM

## 2019-07-29 RX ORDER — SODIUM CHLORIDE 0.9 % (FLUSH) 0.9 %
10 SYRINGE (ML) INJECTION EVERY 12 HOURS SCHEDULED
Status: DISCONTINUED | OUTPATIENT
Start: 2019-07-29 | End: 2019-07-30 | Stop reason: HOSPADM

## 2019-07-29 RX ORDER — MORPHINE SULFATE 2 MG/ML
2 INJECTION, SOLUTION INTRAMUSCULAR; INTRAVENOUS EVERY 4 HOURS PRN
Status: DISCONTINUED | OUTPATIENT
Start: 2019-07-29 | End: 2019-07-30 | Stop reason: HOSPADM

## 2019-07-29 RX ORDER — MORPHINE SULFATE 2 MG/ML
1 INJECTION, SOLUTION INTRAMUSCULAR; INTRAVENOUS EVERY 4 HOURS PRN
Status: DISCONTINUED | OUTPATIENT
Start: 2019-07-29 | End: 2019-07-30 | Stop reason: HOSPADM

## 2019-07-29 RX ORDER — HYDROCODONE BITARTRATE AND ACETAMINOPHEN 5; 325 MG/1; MG/1
1 TABLET ORAL EVERY 4 HOURS PRN
Status: DISCONTINUED | OUTPATIENT
Start: 2019-07-29 | End: 2019-07-30 | Stop reason: HOSPADM

## 2019-07-29 RX ORDER — 0.9 % SODIUM CHLORIDE 0.9 %
1000 INTRAVENOUS SOLUTION INTRAVENOUS ONCE
Status: COMPLETED | OUTPATIENT
Start: 2019-07-29 | End: 2019-07-29

## 2019-07-29 RX ADMIN — FLECAINIDE ACETATE 100 MG: 50 TABLET ORAL at 22:49

## 2019-07-29 RX ADMIN — SODIUM CHLORIDE 1000 ML: 9 INJECTION, SOLUTION INTRAVENOUS at 13:54

## 2019-07-29 RX ADMIN — Medication 10 ML: at 22:49

## 2019-07-29 RX ADMIN — IOPAMIDOL 80 ML: 755 INJECTION, SOLUTION INTRAVENOUS at 14:42

## 2019-07-29 ASSESSMENT — ENCOUNTER SYMPTOMS
VOMITING: 0
RHINORRHEA: 0
DIARRHEA: 0
SORE THROAT: 0
SHORTNESS OF BREATH: 0
WHEEZING: 0
BACK PAIN: 0
EYE DISCHARGE: 0
COUGH: 0
ABDOMINAL PAIN: 0
NAUSEA: 0
EYE REDNESS: 0

## 2019-07-29 NOTE — PROGRESS NOTES
REPR,INITIAL Bilateral 8/16/2018    robotic bilateral inguinal hernia repair with mesh performed by Tadeo Hui MD at [address] [place] [address]  2003       Review of Systems   Constitutional: Negative for chills and fever  HENT: Negative for congestion, sinus pressure, sneezing and sore throat. Eyes: Negative for pain, discharge, redness and itching. Respiratory: Negative for apnea, cough  Gastrointestinal: Negative for blood in stool, constipation, diarrhea   Endocrine: Negative for cold intolerance, heat intolerance, polydipsia. Genitourinary: Negative for dysuria, enuresis, flank pain and hematuria. Musculoskeletal: Negative for arthralgias, joint swelling and neck pain. Neurological: Negative for numbness and headaches. Psychiatric/Behavioral: Negative for agitation, confusion, decreased concentration and dysphoric mood. Objective:     BP (!) 190/88   Pulse 72   SpO2 97%     Wt Readings from Last 3 Encounters:   07/29/19 173 lb 3.2 oz (78.6 kg)   07/17/19 176 lb (79.8 kg)   04/17/19 177 lb (80.3 kg)     BP Readings from Last 3 Encounters:   07/29/19 (!) 167/79   07/29/19 (!) 190/88   07/17/19 136/70       Nursing note and vitals reviewed. Physical Exam   Constitutional: Oriented to person, place, and time. Appears well-developed and well-nourished. HENT:   Head: Normocephalic and atraumatic. Eyes: EOM are normal. Pupils are equal, round, and reactive to light. Neck: Normal range of motion. Neck supple. No JVD present. Cardiovascular: Normal rate, regular rhythm, normal heart sounds and intact distal pulses. No murmur heard. Pulmonary/Chest: Effort normal and breath sounds normal. No respiratory distress. No wheezes. No rales. Abdominal: Soft. Bowel sounds are normal. No distension. There is no tenderness. Musculoskeletal: Normal range of motion. No edema. Neurological: Alert and oriented to person, place, and time. No cranial nerve deficit.  Able to do finger to nose,  With lateral gaze he has some fleeting nystagmus. Skin: Skin is warm and dry. Psychiatric: Normal mood and affect.        No results found for: CKTOTAL, CKMB, CKMBINDEX    Lab Results   Component Value Date    WBC 5.5 07/29/2019    RBC 4.63 07/29/2019    HGB 15.7 07/29/2019    HCT 46.3 07/29/2019    .0 07/29/2019    MCH 33.9 07/29/2019    MCHC 33.9 07/29/2019    RDW 12.5 09/15/2017     07/29/2019    MPV 9.6 07/29/2019       Lab Results   Component Value Date     07/29/2019    K 4.4 07/29/2019     07/29/2019    CO2 26 07/29/2019    BUN 12 07/29/2019    LABALBU 4.3 07/29/2019    CREATININE 0.9 07/29/2019    CALCIUM 9.4 07/29/2019    LABGLOM 80 07/29/2019    GLUCOSE 103 07/29/2019    GLUCOSE 98 07/26/2018       Lab Results   Component Value Date    ALKPHOS 92 07/29/2019    ALT 16 07/29/2019    AST 21 07/29/2019    PROT 7.1 07/29/2019    BILITOT 0.5 07/29/2019    BILIDIR <0.2 07/29/2019    LABALBU 4.3 07/29/2019       Lab Results   Component Value Date    MG 2.1 07/29/2019       Lab Results   Component Value Date    INR 1.42 (H) 07/29/2019    INR 1.01 08/16/2018    INR 0.92 09/09/2017         No results found for: LABA1C    Lab Results   Component Value Date    TRIG 109 05/11/2019    HDL 52 05/11/2019    LDLCALC 72 05/11/2019       No results found for: TSH      Testing Reviewed:      I have individually reviewed the cardiac test below:    ECHO:   Results for orders placed during the hospital encounter of 07/24/19   ECHO Complete 2D W Doppler W Color    Narrative Transthoracic Echocardiography Report (TTE)     Demographics      Patient Name   [facility]        Gender              Male                  Giancarlo Helton      MR #           [id number]        Race                                                      Ethnicity      Account #      [de-identified]        Room Number      Accession      [id number]        Date of Study       07/24/2019   Number      Date of Birth  1934       Referring

## 2019-07-29 NOTE — ED PROVIDER NOTES
Negative for arthralgias, back pain, joint swelling and neck pain. Skin: Negative for pallor and rash. Allergic/Immunologic: Negative for environmental allergies. Neurological: Positive for light-headedness. Negative for dizziness, syncope, speech difficulty, weakness, numbness and headaches. Hematological: Negative for adenopathy. Psychiatric/Behavioral: Negative for confusion and suicidal ideas. The patient is not nervous/anxious. PAST MEDICAL HISTORY    has a past medical history of A-fib ([facility] Bronchitis, CAD (coronary artery disease), Chronic kidney disease, Hypertension, Stroke (cerebrum) ([facility] Thyroid disease, and Trigeminal neuralgia. SURGICAL HISTORY      has a past surgical history that includes Appendectomy (); Kidney stone surgery (); Gallbladder surgery (); TURP (); brain surgery (); Cholecystectomy; Colonoscopy (); and pr lap,inguinal hernia repr,initial (Bilateral, 2018). CURRENT MEDICATIONS       Current Discharge Medication List      CONTINUE these medications which have NOT CHANGED    Details   amLODIPine (NORVASC) 5 MG tablet Take 1 tablet by mouth daily  Qty: 90 tablet, Refills: 3    Associated Diagnoses: Thoracic aortic aneurysm without rupture ([facility] [address] Essential hypertension      XARELTO 20 MG TABS tablet TAKE ONE TABLET BY MOUTH EVERY DAY WITH BREAKFAST  Qty: 90 tablet, Refills: 3      atorvastatin (LIPITOR) 10 MG tablet TAKE ONE TABLET BY MOUTH EVERY DAY  Qty: 90 tablet, Refills: 2      flecainide (TAMBOCOR) 100 MG tablet TAKE ONE TABLET BY MOUTH TWICE A DAY  Qty: 180 tablet, Refills: 3      levothyroxine (SYNTHROID) 25 MCG tablet Take 12.5 mcg by mouth Daily      MULTIPLE VITAMIN PO Take 1 tablet by mouth daily              ALLERGIES     is allergic to prednisone. FAMILY HISTORY     He indicated that his mother is . He indicated that his father is . He indicated that his sister is .  He indicated that two of his effusions and atelectasis, edema or chronic changes in the lung bases. Correlation with symptoms is advised. **This report has been created using voice recognition software. It may contain minor errors which are inherent in voice recognition technology. **      Final report electronically signed by Dr. Savannah Garcia on 7/29/2019 1:39 PM          LABS:   Labs Reviewed   CBC WITH AUTO DIFFERENTIAL - Abnormal; Notable for the following components:       Result Value    RBC 4.63 (*)     .0 (*)     MCH 33.9 (*)     All other components within normal limits   APTT - Abnormal; Notable for the following components:    aPTT 44.4 (*)     All other components within normal limits   PROTIME-INR - Abnormal; Notable for the following components:    INR 1.42 (*)     All other components within normal limits   URINE RT REFLEX TO CULTURE - Abnormal; Notable for the following components:    Ketones, Urine TRACE (*)     All other components within normal limits   GLOMERULAR FILTRATION RATE, ESTIMATED - Abnormal; Notable for the following components:    Est, Glom Filt Rate 80 (*)     All other components within normal limits   CBC WITH AUTO DIFFERENTIAL - Abnormal; Notable for the following components:    RBC 4.19 (*)     .0 (*)     MCH 33.7 (*)     MPV 9.3 (*)     All other components within normal limits   SPECIMEN REJECTION   TROPONIN   HEPATIC FUNCTION PANEL   BASIC METABOLIC PANEL   BRAIN NATRIURETIC PEPTIDE   MAGNESIUM   ANION GAP   OSMOLALITY   TROPONIN   BASIC METABOLIC PANEL W/ REFLEX TO MG FOR LOW K   MAGNESIUM   TROPONIN   TROPONIN   ANION GAP   GLOMERULAR FILTRATION RATE, ESTIMATED   TSH WITH REFLEX       EMERGENCY DEPARTMENT COURSE:   Vitals:    Vitals:    07/30/19 0436 07/30/19 0945 07/30/19 1045 07/30/19 1114   BP: 125/74 (!) 146/70 137/68    Pulse: 60 58 66 66   Resp: 16 18     Temp: 97.6 °F (36.4 °C) 97.5 °F (36.4 °C)  98.1 °F (36.7 °C)   TempSrc: Oral Oral  Oral   SpO2: 97% 98%  97%   Weight: the services described in the documentation, reviewed and edited the documentation which was dictated to the scribe in my presence, and it accurately records my words and actions.     Emelyn Francois PA-C 07/30/19 12:17 PM    ELIA [place], [place]  07/30/19 [id number]

## 2019-07-29 NOTE — PROGRESS NOTES
Pharmacy Medication History Note      List of current medications patient is taking is complete. Source of information: patient, patient spouse, Epic dispense report    Changes made to medication list:  Medications removed (include reason, ex. therapy complete or physician discontinued): Saline nasal spray - pt states he no longer uses after having his nose cauterized    Medications added/doses adjusted:  None    Other notes (ex. Recent course of antibiotics, Coumadin dosing):  Denies use of other OTC or herbal medications.         Electronically signed by Sanam Bailey, [address] [place] [address] on 7/29/2019 at 2:55 PM

## 2019-07-30 ENCOUNTER — APPOINTMENT (OUTPATIENT)
Dept: MRI IMAGING | Age: 84
DRG: 149 | End: 2019-07-30
Payer: MEDICARE

## 2019-07-30 ENCOUNTER — TELEPHONE (OUTPATIENT)
Dept: FAMILY MEDICINE CLINIC | Age: 84
End: 2019-07-30

## 2019-07-30 VITALS
DIASTOLIC BLOOD PRESSURE: 72 MMHG | HEIGHT: 70 IN | HEART RATE: 64 BPM | BODY MASS INDEX: 24.38 KG/M2 | WEIGHT: 170.3 LBS | RESPIRATION RATE: 18 BRPM | OXYGEN SATURATION: 97 % | SYSTOLIC BLOOD PRESSURE: 153 MMHG | TEMPERATURE: 97.4 F

## 2019-07-30 PROBLEM — R07.9 CHEST PAIN: Status: RESOLVED | Noted: 2019-07-29 | Resolved: 2019-07-30

## 2019-07-30 PROBLEM — R42 DIZZINESS, NONSPECIFIC: Status: RESOLVED | Noted: 2019-07-29 | Resolved: 2019-07-30

## 2019-07-30 LAB
ANION GAP SERPL CALCULATED.3IONS-SCNC: 10 MEQ/L (ref 8–16)
BASOPHILS # BLD: 0.5 %
BASOPHILS ABSOLUTE: 0 THOU/MM3 (ref 0–0.1)
BUN BLDV-MCNC: 13 MG/DL (ref 7–22)
CALCIUM SERPL-MCNC: 9.2 MG/DL (ref 8.5–10.5)
CHLORIDE BLD-SCNC: 106 MEQ/L (ref 98–111)
CO2: 27 MEQ/L (ref 23–33)
CREAT SERPL-MCNC: 0.8 MG/DL (ref 0.4–1.2)
EOSINOPHIL # BLD: 3.2 %
EOSINOPHILS ABSOLUTE: 0.2 THOU/MM3 (ref 0–0.4)
ERYTHROCYTE [DISTWIDTH] IN BLOOD BY AUTOMATED COUNT: 11.9 % (ref 11.5–14.5)
ERYTHROCYTE [DISTWIDTH] IN BLOOD BY AUTOMATED COUNT: 43.6 FL (ref 35–45)
GFR SERPL CREATININE-BSD FRML MDRD: > 90 ML/MIN/1.73M2
GLUCOSE BLD-MCNC: 101 MG/DL (ref 70–108)
HCT VFR BLD CALC: 42.3 % (ref 42–52)
HEMOGLOBIN: 14.1 GM/DL (ref 14–18)
IMMATURE GRANS (ABS): 0.03 THOU/MM3 (ref 0–0.07)
IMMATURE GRANULOCYTES: 0.5 %
LYMPHOCYTES # BLD: 24.9 %
LYMPHOCYTES ABSOLUTE: 1.4 THOU/MM3 (ref 1–4.8)
MAGNESIUM: 2.1 MG/DL (ref 1.6–2.4)
MCH RBC QN AUTO: 33.7 PG (ref 26–33)
MCHC RBC AUTO-ENTMCNC: 33.3 GM/DL (ref 32.2–35.5)
MCV RBC AUTO: 101 FL (ref 80–94)
MONOCYTES # BLD: 14 %
MONOCYTES ABSOLUTE: 0.8 THOU/MM3 (ref 0.4–1.3)
NUCLEATED RED BLOOD CELLS: 0 /100 WBC
PLATELET # BLD: 184 THOU/MM3 (ref 130–400)
PMV BLD AUTO: 9.3 FL (ref 9.4–12.4)
POTASSIUM REFLEX MAGNESIUM: 4.6 MEQ/L (ref 3.5–5.2)
RBC # BLD: 4.19 MILL/MM3 (ref 4.7–6.1)
SEG NEUTROPHILS: 56.9 %
SEGMENTED NEUTROPHILS ABSOLUTE COUNT: 3.2 THOU/MM3 (ref 1.8–7.7)
SODIUM BLD-SCNC: 143 MEQ/L (ref 135–145)
T4 FREE: 1.1 NG/DL (ref 0.93–1.76)
TROPONIN T: < 0.01 NG/ML
TSH SERPL DL<=0.05 MIU/L-ACNC: 4.24 UIU/ML (ref 0.4–4.2)
WBC # BLD: 5.7 THOU/MM3 (ref 4.8–10.8)

## 2019-07-30 PROCEDURE — 99222 1ST HOSP IP/OBS MODERATE 55: CPT | Performed by: PSYCHIATRY & NEUROLOGY

## 2019-07-30 PROCEDURE — 72141 MRI NECK SPINE W/O DYE: CPT

## 2019-07-30 PROCEDURE — 80048 BASIC METABOLIC PNL TOTAL CA: CPT

## 2019-07-30 PROCEDURE — 2580000003 HC RX 258: Performed by: INTERNAL MEDICINE

## 2019-07-30 PROCEDURE — 99223 1ST HOSP IP/OBS HIGH 75: CPT | Performed by: INTERNAL MEDICINE

## 2019-07-30 PROCEDURE — 70551 MRI BRAIN STEM W/O DYE: CPT

## 2019-07-30 PROCEDURE — 83735 ASSAY OF MAGNESIUM: CPT

## 2019-07-30 PROCEDURE — 85025 COMPLETE CBC W/AUTO DIFF WBC: CPT

## 2019-07-30 PROCEDURE — 84484 ASSAY OF TROPONIN QUANT: CPT

## 2019-07-30 PROCEDURE — 84443 ASSAY THYROID STIM HORMONE: CPT

## 2019-07-30 PROCEDURE — 84439 ASSAY OF FREE THYROXINE: CPT

## 2019-07-30 PROCEDURE — 36415 COLL VENOUS BLD VENIPUNCTURE: CPT

## 2019-07-30 PROCEDURE — 6370000000 HC RX 637 (ALT 250 FOR IP): Performed by: INTERNAL MEDICINE

## 2019-07-30 PROCEDURE — 99239 HOSP IP/OBS DSCHRG MGMT >30: CPT | Performed by: INTERNAL MEDICINE

## 2019-07-30 RX ADMIN — AMLODIPINE BESYLATE 5 MG: 5 TABLET ORAL at 10:06

## 2019-07-30 RX ADMIN — RIVAROXABAN 20 MG: 20 TABLET, FILM COATED ORAL at 10:05

## 2019-07-30 RX ADMIN — LEVOTHYROXINE SODIUM 12.5 MCG: 25 TABLET ORAL at 05:44

## 2019-07-30 RX ADMIN — THERA TABS 1 TABLET: TAB at 10:05

## 2019-07-30 RX ADMIN — Medication 10 ML: at 10:07

## 2019-07-30 RX ADMIN — FLECAINIDE ACETATE 100 MG: 50 TABLET ORAL at 10:05

## 2019-07-30 RX ADMIN — ATORVASTATIN CALCIUM 10 MG: 10 TABLET, FILM COATED ORAL at 10:05

## 2019-07-30 NOTE — CARE COORDINATION
7/30/19, 7:50 AM      Khari Morillo       Admitted from: ER 7/29/2019/ [address] day: 1   Location: [facility]06/006-A Reason for admit: Dizziness, nonspecific [R42]  Chest pain [R07.9] Status: IP  Admit order signed?: yes  PMH:  has a past medical history of A-fib ([facility] [address] Bronchitis, CAD (coronary artery disease), Chronic kidney disease, Hypertension, Stroke (cerebrum) ([facility] [address] Thyroid disease, and Trigeminal neuralgia. Medications:  Scheduled Meds:   amLODIPine  5 mg Oral Daily    atorvastatin  10 mg Oral Daily    flecainide  100 mg Oral BID    levothyroxine  12.5 mcg Oral Daily    multivitamin   Oral Daily    rivaroxaban  20 mg Oral Daily with breakfast    sodium chloride flush  10 mL Intravenous 2 times per day     Continuous Infusions:   Pertinent Info/Orders/Treatment Plan:  Ortho BPs + (sitting to standing). Neuro and cardio consults pending. MRI brain pending. Diet: Diet NPO, After Midnight   Smoking status:  reports that he has never smoked. He has never used smokeless tobacco.   PCP: Marylee Perla, MD  Readmission: no    Discharge Planning  Current Residence:  Private Residence  Living Arrangements:  Spouse/Significant Other   Support Systems:  Children  Current Services PTA:     Potential Assistance Needed:  N/A  Potential Assistance Purchasing Medications:  No  Does patient want to participate in local refill/ meds to beds program?  No  Type of Home Care Services:  None  Patient expects to be discharged to:  home  Expected Discharge date:  07/31/19  Follow Up Appointment: Best Day/ Time: Monday AM    Discharge Plan: Spoke with Melony Holloway, he plans return home with his wife. He is independent with his care, does not use [facility] or [facility] services. He denies discharge needs.       Evaluation: no

## 2019-07-30 NOTE — CONSULTS
Procedure Laterality Date    APPENDECTOMY  1951   [facility] BRAIN SURGERY  2003    CHOLECYSTECTOMY      COLONOSCOPY  2013    GALLBLADDER SURGERY  2010    KIDNEY STONE SURGERY  2003    [place] LAP,INGUINAL HERNIA REPR,INITIAL Bilateral 8/16/2018    robotic bilateral inguinal hernia repair with mesh performed by Prem Lin MD at [address] [place] [address]  2003       Allergies:     Allergies   Allergen Reactions    Prednisone      Raised BP        Current Medications:     amLODIPine (NORVASC) tablet 5 mg Daily   atorvastatin (LIPITOR) tablet 10 mg Daily   flecainide (TAMBOCOR) tablet 100 mg BID   levothyroxine (SYNTHROID) tablet 12.5 mcg Daily   multivitamin Daily   rivaroxaban (XARELTO) tablet 20 mg Daily with breakfast   sodium chloride flush 0.9 % injection 10 mL 2 times per day   sodium chloride flush 0.9 % injection 10 mL PRN   magnesium hydroxide (MILK OF MAGNESIA) 400 MG/5ML suspension 30 mL Daily PRN   ondansetron (ZOFRAN) injection 4 mg Q6H PRN   acetaminophen (TYLENOL) tablet 650 mg Q4H PRN   acetaminophen (TYLENOL) tablet 650 mg Q4H PRN   HYDROcodone-acetaminophen (NORCO) 5-325 MG per tablet 1 tablet Q4H PRN   Or    HYDROcodone-acetaminophen (NORCO) 5-325 MG per tablet 2 tablet Q4H PRN   morphine (PF) injection 1 mg Q4H PRN   Or    morphine (PF) injection 2 mg Q4H PRN        Social History:  Social History     Tobacco Use   Smoking Status Never Smoker   Smokeless Tobacco Never Used     Social History     Substance and Sexual Activity   Alcohol Use No     Social History     Substance and Sexual Activity   Drug Use No         Family History:       Problem Relation Age of Onset    Heart Disease Mother     Heart Attack Mother     Diabetes Father     Heart Disease Father     High Blood Pressure Father     Cancer Brother     Diabetes Brother     Heart Disease Brother     Diabetes Brother     Stroke Brother        Review of Systems:  All systems reviewed are negative except what is mentioned in history of present illness. Physical Exam:  /74   Pulse 60   Temp 97.6 °F (36.4 °C) (Oral)   Resp 16   Ht 5' 10\" (1.778 m)   Wt 170 lb 4.8 oz (77.2 kg)   SpO2 97%   BMI 24.44 kg/m²  I Body mass index is 24.44 kg/m². I Wt Readings from Last 1 Encounters:   07/30/19 170 lb 4.8 oz (77.2 kg)         General appearance - alert, well appearing, and in no distress, oriented to person, place, and time and normal weight, he is sitting on the edge of the bed  Mental status -Level of Alertness: awake  Orientation: person, place, time  Memory: normal  Fund of Knowledge: normal  Attention/Concentration: normal  Language: normal. Mood is normal  Neck - supple, no significant adenopathy, carotids upstroke normal bilaterally, no carotid bruits. There is no LAP in the neck. There is no thyroid enlargement. There is limited range of motion of the neck bilaterally. Neurological -   Cranial [id number]:   Cranial nerve II: Normal. There is full visual fields  Cranial nerve III: Pupils: equal, round, reactive to light   Cranial nerves III, IV, VI: Extraocular Movements: intact   Cranial nerve V: Facial sensation: intact   Cranial nerve VII:Facial strength: intact   Cranial nerve VIII: Hearing: intact   Cranial nerve IX: Palate Elevation intact bilaterally  Cranial nerve XI: Shoulder shrug intact bilaterally  Cranial nerve XII: Tongue midline   neck supple without rigidity  DTR's are decreased distal and symmetric  Babinski sign is negative? on bilaterally. Motor exam is 5/5 in the upper and lower extremities. Normal muscle tone . There is no muscle atrophy. There is no muscle fasciculation . Drift No, Orbiting No   Sensory is intact forlight touch? Coordination: finger to nose intact  Gait and station, guarded, he is using walker  Abnormal movement none.   proprioception normal   Skin - no rashes or lesions  Superficial temporal artery pulses are normal.   Musculoskeletal: Has no hand arthritis, no limitation of ROM in any arborization of the distal branches. There is a patent   anterior communicating artery. The bilateral PCAs and SCA's are patent. The superior sagittal sinus, vein of Scar, internal cerebral veins, straight sinus, transverse sinuses and sigmoid sinuses are patent. Postsurgical changes are noted from a right suboccipital craniotomy. No acute intracranial abnormality is identified. The visualized orbits and temporal bone structures are unremarkable. The paranasal sinuses are within normal limits. Multilevel   degenerative changes are present within the cervical spine. This causes mild to moderate spinal canal narrowing at C7-T1. No suspicious osseous lesion is identified. No suspicious finding is seen within the visualized paraspinal soft tissues. The   visualized lung apices are unremarkable. Impression    1. No major branch occlusion, flow-limiting stenosis or aneurysm is identified intracranially. 2. No occlusion, flow-limiting stenosis, dissection or aneurysm is identified of the major cervical arterial structures. Note is made that the right vertebral artery is hypoplastic and terminates in a PICA branch. **This report has been created using voice recognition software. It may contain minor errors which are inherent in voice recognition technology. **    Final report electronically signed by Dr. Juan Antonio Harden on 7/29/2019 3:28 PM     Results for orders placed during the hospital encounter of 07/29/19   CTA NECK W WO CONTRAST    Narrative PROCEDURE: CTA NECK W WO CONTRAST, CTA HEAD W WO CONTRAST    CLINICAL INFORMATION: Acute onset of dizziness. COMPARISON: None available. Correlation is made to MR angiogram of the head dated September 18, 2017. TECHNIQUE: 1 mm axial images were obtained through the head and neck after the fast bolus administration of contrast. A noncontrast localizer was obtained. 3-D reconstructions were performed on a dedicated 3-D workstation.  These include paranasal sinuses are within normal limits. Multilevel   degenerative changes are present within the cervical spine. This causes mild to moderate spinal canal narrowing at C7-T1. No suspicious osseous lesion is identified. No suspicious finding is seen within the visualized paraspinal soft tissues. The   visualized lung apices are unremarkable. Impression    1. No major branch occlusion, flow-limiting stenosis or aneurysm is identified intracranially. 2. No occlusion, flow-limiting stenosis, dissection or aneurysm is identified of the major cervical arterial structures. Note is made that the right vertebral artery is hypoplastic and terminates in a PICA branch. **This report has been created using voice recognition software. It may contain minor errors which are inherent in voice recognition technology. **    Final report electronically signed by Dr. Laquita Quinones on 7/29/2019 3:28 PM     Results for orders placed during the hospital encounter of 09/18/17   MRI BRAIN WO CONTRAST    Addendum ** ADDENDUM #1 **  This examination is reviewed and discussed with Dr. Sujit Mendez. There is  scattered fluid in the left mastoid air cells. Of note, this does contain  some high signal on the T1-weighted images best seen posteriorly. This can  indicate blood products or  proteinaceous fluid. The patient does take Xarelto. Per Dr. Sujit Mendez, on  clinical examination the patient has a hemotympanum on the left. In light  of this, this fluid is most likely blood. This fluid is new since a head  CT dated 9/10/2017. There is no fluid signal in the right mastoid air cells. There are severe degenerative changes in the left temporomandibular joint. There are mild degenerative changes in the right temporomandibular joint. Final report electronically signed by Dr. Majo Be on 9/28/2017 3:45  PM  ** ORIGINAL REPORT ** PROCEDURE: MRI BRAIN WO CONTRAST  CLINICAL INFORMATION Dizziness, Balance problem. Lightheadedness.

## 2019-07-30 NOTE — CONSULTS
shortness of breath on exertion when he was under hot  and humid weather. Troponin x3 are negative. No acute EKG abnormality  and currently the patient is comfortable. REVIEW OF SYSTEMS:  Negative except the above-mentioned ones. PRIMARY CARDIOLOGIST:  Louisa Spann MD    PAST MEDICAL HISTORY:  Paroxysmal atrial fibrillation, mild-to-moderate  nonobstructive coronary artery disease, chronic kidney disease,  hypertension, history of stroke, trigeminal neuralgia, thyroid disease,  hypothyroidism. PAST SURGICAL HISTORY:  TURP, kidney stone removal, gallbladder surgery,  colonoscopy, cholecystectomy, brain surgery, and appendectomy. FAMILY HISTORY:  Positive for heart disease, coronary artery disease,  and diabetes. SOCIAL HISTORY:  He quit smoking in 2015 after 15 pack-year of smoking. No alcohol. No drug use. ALLERGIES:  Allergic to _____. HOME MEDICATIONS:  Prior to admission include amlodipine, Xarelto,  Lipitor, Tambocor, Synthroid 12.5 mcg, multivitamin. PHYSICAL EXAMINATION:  GENERAL:  Looks comfortable, in no form of distress. VITAL SIGNS:  Blood pressure 137/68, pulse rate 50, respiratory rate 18,  temperature 97.5. HEENT:  Pink conjunctivae. Nonicteric sclerae. Pupils are equal and  reactive bilaterally to light. NECK:  No lymphadenopathy. No goiter. No JVD. CHEST:  Clear to auscultation and resonant to percussion. CARDIOVASCULAR:  Arteries are felt; carotid, femoral, pedal.  No bruits. S1 and S2 well heard. No murmur or gallop rhythm. ABDOMEN:  Soft, nontender, nondistended. Bowel sounds are positive. GENITOURINARY:  No CVA, flank, or suprapubic tenderness. LOCOMOTOR:  No cyanosis, clubbing, or muscle or joint tenderness. SKIN:  No rashes, ulcers, or nodules noted. CNS:  Alert, awake, oriented to time, person, and place. Cranial nerves  are intact. Sensation is intact to light touch and pain. Motor:   Normal muscle strength and tone.   PSYCHIATRIC: Appropriate mood and affect. DIAGNOSTIC DATA:  EKG on this admission, the patient is in a normal  sinus rhythm, first-degree AV block and left ventricular hypertrophy. No acute EKG abnormality as such. Compared to the previous, there is no  significant changes. Echocardiogram in 07/2019, ejection fraction 55%. Blood chemistry, sodium 143, potassium 4.6, BUN 13, creatinine 0.8. Troponin x3 less than 0.01. Chest x-ray, small bilateral pleural effusion, atelectasis, edema,  chronic changes in the lung. Correlation with the symptom is required. Otherwise, no evidence of definite congestion on this chest x-ray. He  had a cardiac catheterization in 09/2017, LAD, maximum lesion is in mid  LAD, around 50% stenosis. Otherwise, does have 30% lesion. ASSESSMENT:  1.  Dizziness, clinically consistent with benign paroxysmal positional  vertigo. The dizziness is positional and postural.  The patient denied  stating vertigo, but clinically from the detailed history evaluation  will appear certainly the vertigo. It is positional and not a postural.  Rather it is a positional, depending on the position of the head and  sudden movement of the head induced. 2.  Paroxysmal atrial fibrillation. 3.  One episode of chest pain yesterday. 4.  Hypertension. 5.  Hypothyroidism. 6.  Coronary artery disease, mild to moderate. Nonobstructive coronary  artery disease per cath in 2017. PLAN AND RECOMMENDATION:  1. At this level, bedside orthostasis evaluation was done. It shows no  orthostatic drop. 2.  Echocardiogram within normal limits. Consider recommended  functional study to be done as inpatient or as outpatient and family  considered to get it done as outpatient. Basically, from cardiac point  of view, it looks like more of a benign paroxysmal positional vertigo.    The patient had similar episode several years ago and consider  vestibular rehabilitation if need arise down the line if there is

## 2019-07-31 ENCOUNTER — TELEPHONE (OUTPATIENT)
Dept: FAMILY MEDICINE CLINIC | Age: 84
End: 2019-07-31

## 2019-07-31 NOTE — TELEPHONE ENCOUNTER
[facility] Transitions Initial Follow Up Call    Outreach made within 2 business days of discharge: Yes    Patient: Joey Suarez Patient : 1934   MRN: [id number]  Reason for Admission: There are no discharge diagnoses documented for the most recent discharge. Discharge Date: 19       Spoke with: Julian    Discharge department/facility: [facility]    TCM Interactive Patient Contact:  Was patient able to fill all prescriptions: Yes  Was patient instructed to bring all medications to the follow-up visit: Yes  Is patient taking all medications as directed in the discharge summary?  Yes  Does patient understand their discharge instructions: Yes  Does patient have questions or concerns that need addressed prior to 7-14 day follow up office visit: yes    Scheduled appointment with PCP within 7-14 days    Follow Up  Future Appointments   Date Time Provider Flower Magallanes   2019  8:30 AM Ebenezer Keller MD [address] [facility] [facility]   10/16/2019 10:45 AM Ebenezer Keller MD [facility] [place]   2020 11:45 AM [facility] F[facility] [place]   2020 10:15 AM Brad Rowell MD [address] [place] [address] ([address] [place] [address])

## 2019-08-01 NOTE — CARE COORDINATION
8/1/19, 11:13 AM    Jose was discharged to home with wife. Denied needs. Discharge plan discussed by  and . Discharge plan reviewed with patient/ family. Patient/ family verbalize understanding of discharge plan and are in agreement with plan. Understanding was demonstrated using the teach back method.

## 2019-08-07 ENCOUNTER — OFFICE VISIT (OUTPATIENT)
Dept: FAMILY MEDICINE CLINIC | Age: 84
End: 2019-08-07
Payer: MEDICARE

## 2019-08-07 ENCOUNTER — HOSPITAL ENCOUNTER (OUTPATIENT)
Dept: GENERAL RADIOLOGY | Age: 84
Discharge: HOME | End: 2019-08-07
Payer: MEDICARE

## 2019-08-07 VITALS
HEART RATE: 53 BPM | BODY MASS INDEX: 25 KG/M2 | OXYGEN SATURATION: 93 % | WEIGHT: 174.2 LBS | SYSTOLIC BLOOD PRESSURE: 128 MMHG | RESPIRATION RATE: 16 BRPM | DIASTOLIC BLOOD PRESSURE: 80 MMHG

## 2019-08-07 DIAGNOSIS — R42 DIZZINESS: Primary | ICD-10-CM

## 2019-08-07 DIAGNOSIS — R00.1 BRADYCARDIA: ICD-10-CM

## 2019-08-07 DIAGNOSIS — E03.8 OTHER SPECIFIED HYPOTHYROIDISM: ICD-10-CM

## 2019-08-07 DIAGNOSIS — I25.118 CORONARY ARTERY DISEASE OF NATIVE ARTERY OF NATIVE HEART WITH STABLE ANGINA PECTORIS (HCC): ICD-10-CM

## 2019-08-07 DIAGNOSIS — I48.20 CHRONIC ATRIAL FIBRILLATION (HCC): ICD-10-CM

## 2019-08-07 DIAGNOSIS — R42 DIZZINESS: ICD-10-CM

## 2019-08-07 PROCEDURE — 93226 XTRNL ECG REC<48 HR SCAN A/R: CPT

## 2019-08-07 PROCEDURE — 2709999900 HC NON-CHARGEABLE SUPPLY

## 2019-08-07 PROCEDURE — 1111F DSCHRG MED/CURRENT MED MERGE: CPT | Performed by: FAMILY MEDICINE

## 2019-08-07 PROCEDURE — 93225 XTRNL ECG REC<48 HRS REC: CPT

## 2019-08-07 PROCEDURE — 99495 TRANSJ CARE MGMT MOD F2F 14D: CPT | Performed by: FAMILY MEDICINE

## 2019-08-07 RX ORDER — LEVOTHYROXINE SODIUM 0.03 MG/1
12.5 TABLET ORAL DAILY
Qty: 45 TABLET | Refills: 3 | Status: SHIPPED | OUTPATIENT
Start: 2019-08-07 | End: 2020-07-27 | Stop reason: SDUPTHER

## 2019-08-07 ASSESSMENT — ENCOUNTER SYMPTOMS
NAUSEA: 0
ABDOMINAL PAIN: 0
EYE DISCHARGE: 0
COUGH: 0
WHEEZING: 0
SHORTNESS OF BREATH: 0
SORE THROAT: 0
CONSTIPATION: 1
DIARRHEA: 0
RHINORRHEA: 0

## 2019-08-07 NOTE — PROGRESS NOTES
levothyroxine (SYNTHROID) 25 MCG tablet Take 0.5 tablets by mouth Daily 45 tablet 3    amLODIPine (NORVASC) 5 MG tablet Take 1 tablet by mouth daily 90 tablet 3    XARELTO 20 MG TABS tablet TAKE ONE TABLET BY MOUTH EVERY DAY WITH BREAKFAST 90 tablet 3    atorvastatin (LIPITOR) 10 MG tablet TAKE ONE TABLET BY MOUTH EVERY DAY 90 tablet 2    flecainide (TAMBOCOR) 100 MG tablet TAKE ONE TABLET BY MOUTH TWICE A  tablet 3    MULTIPLE VITAMIN PO Take 1 tablet by mouth daily           Medications patient taking as of now reconciled against medications ordered at time of hospital discharge: Yes    Chief Complaint   Patient presents with    Follow-Up from Hospital     Dizziness    Fatigue     Patient states that the dizziness is better but that he is extremely tired. Presents for hospital follow-up. He went to the hospital after waking up with acute onset severe dizziness. Patient has history of coronary artery disease as well as A. fib so was evaluated from a cardiac and neuro standpoint in the hospital.  CT and MRI as well as labs were all within normal limits. Patient's blood pressure was elevated initially in the hospital as was his heart rate. Patient states that he may be feels a little better but still feels dizzy and lightheaded today. States it does not feel like the room is spinning around him but rather he feels lightheaded. His heart rate today is only 53. He is not on a beta-blocker. Also patient's thyroid was minimally office his TSH was 4.24. He does ask for refill of his thyroid medication. Does complain of constipation which is controlled with apple juice. Inpatient course: Discharge summary reviewed- see chart. Interval history/Current status: see HPI    Review of Systems   Constitutional: Positive for activity change, appetite change and fatigue. Negative for chills and fever. HENT: Positive for ear pain (fullness, on left).  Negative for congestion, rhinorrhea and sore throat. Eyes: Negative for discharge and visual disturbance. Respiratory: Negative for cough, shortness of breath and wheezing. Cardiovascular: Negative for chest pain and palpitations. Gastrointestinal: Positive for constipation. Negative for abdominal pain, diarrhea and nausea. Genitourinary: Negative for dysuria and hematuria. Musculoskeletal: Negative for arthralgias and myalgias. Neurological: Positive for dizziness and light-headedness. Negative for headaches. Psychiatric/Behavioral: Negative for sleep disturbance. The patient is not nervous/anxious. Vitals:    08/07/19 0829   BP: 128/80   Site: Left Upper Arm   Position: Sitting   Cuff Size: Medium Adult   Pulse: 53   Resp: 16   SpO2: 93%   Weight: 174 lb 3.2 oz (79 kg)     Body mass index is 25 kg/m². Wt Readings from Last 3 Encounters:   08/07/19 174 lb 3.2 oz (79 kg)   07/30/19 170 lb 4.8 oz (77.2 kg)   07/17/19 176 lb (79.8 kg)     BP Readings from Last 3 Encounters:   08/07/19 128/80   07/30/19 (!) 153/72   07/29/19 (!) 190/88       Physical Exam   Constitutional: He is oriented to person, place, and time. He appears well-developed and well-nourished. No distress. HENT:   Head: Normocephalic and atraumatic. Right Ear: Hearing, tympanic membrane, external ear and ear canal normal.   Left Ear: Hearing, tympanic membrane, external ear and ear canal normal.   Nose: Right sinus exhibits no maxillary sinus tenderness and no frontal sinus tenderness. Left sinus exhibits no maxillary sinus tenderness and no frontal sinus tenderness. Mouth/Throat: Oropharynx is clear and moist and mucous membranes are normal. No oropharyngeal exudate, posterior oropharyngeal edema or posterior oropharyngeal erythema. Eyes: Pupils are equal, round, and reactive to light. Conjunctivae and EOM are normal. Right eye exhibits no discharge. Left eye exhibits no discharge. No scleral icterus. Neck: Normal range of motion. Neck supple.

## 2019-08-07 NOTE — PROGRESS NOTES
Holter monitor placed on patient and instructions given to patient. Patient and his wife verbalize understanding. Patient sent with log and tape.

## 2019-08-14 LAB
ACQUISITION DURATION: NORMAL S
AVERAGE HEART RATE: 58 BPM
FASTEST SUPRAVENTRICULAR RATE: 106 BPM
HOOKUP DATE: NORMAL
HOOKUP TIME: NORMAL
LONGEST SUPRAVENTRICULAR RATE: 76 BPM
MAX HEART RATE TIME/DATE: NORMAL
MAX HEART RATE: 101 BPM
MIN HEART RATE TIME/DATE: NORMAL
MIN HEART RATE: 41 BPM
NUMBER OF FASTEST SUPRAVENTRICULAR BEATS: 3
NUMBER OF LONGEST SUPRAVENTRICULAR BEATS: 7
NUMBER OF QRS COMPLEXES: NORMAL
NUMBER OF SUPRAVENTRICULAR BEATS IN RUNS: 21
NUMBER OF SUPRAVENTRICULAR COUPLETS: 10
NUMBER OF SUPRAVENTRICULAR ECTOPICS: 66
NUMBER OF SUPRAVENTRICULAR ISOLATED BEATS: 25
NUMBER OF SUPRAVENTRICULAR RUNS: 5
NUMBER OF VENTRICULAR BEATS IN RUNS: 0
NUMBER OF VENTRICULAR BIGEMINAL CYCLES: 0
NUMBER OF VENTRICULAR COUPLETS: 0
NUMBER OF VENTRICULAR ECTOPICS: 204
NUMBER OF VENTRICULAR ISOLATED BEATS: 204
NUMBER OF VENTRICULAR RUNS: 0

## 2019-08-28 ENCOUNTER — TELEPHONE (OUTPATIENT)
Dept: FAMILY MEDICINE CLINIC | Age: 84
End: 2019-08-28

## 2019-09-12 DIAGNOSIS — I71.20 THORACIC AORTIC ANEURYSM WITHOUT RUPTURE: Primary | ICD-10-CM

## 2019-09-13 ENCOUNTER — HOSPITAL ENCOUNTER (OUTPATIENT)
Dept: CT IMAGING | Age: 84
Discharge: HOME | End: 2019-09-13
Payer: MEDICARE

## 2019-09-13 DIAGNOSIS — I71.20 THORACIC AORTIC ANEURYSM WITHOUT RUPTURE: ICD-10-CM

## 2019-09-13 LAB — POC CREATININE WHOLE BLOOD: 0.9 MG/DL (ref 0.5–1.2)

## 2019-09-13 PROCEDURE — 71275 CT ANGIOGRAPHY CHEST: CPT

## 2019-09-13 PROCEDURE — 82565 ASSAY OF CREATININE: CPT

## 2019-09-13 PROCEDURE — 6360000004 HC RX CONTRAST MEDICATION: Performed by: FAMILY MEDICINE

## 2019-09-13 RX ADMIN — IOPAMIDOL 95 ML: 755 INJECTION, SOLUTION INTRAVENOUS at 10:42

## 2019-09-18 LAB — TSH SERPL DL<=0.05 MIU/L-ACNC: 3.78 UIU/ML (ref 0.49–4.67)

## 2019-10-16 ENCOUNTER — OFFICE VISIT (OUTPATIENT)
Dept: FAMILY MEDICINE CLINIC | Age: 84
End: 2019-10-16
Payer: MEDICARE

## 2019-10-16 VITALS
SYSTOLIC BLOOD PRESSURE: 132 MMHG | BODY MASS INDEX: 25.2 KG/M2 | RESPIRATION RATE: 20 BRPM | OXYGEN SATURATION: 97 % | HEART RATE: 58 BPM | DIASTOLIC BLOOD PRESSURE: 80 MMHG | HEIGHT: 70 IN | WEIGHT: 176 LBS

## 2019-10-16 DIAGNOSIS — I10 ESSENTIAL HYPERTENSION: Primary | ICD-10-CM

## 2019-10-16 DIAGNOSIS — Z23 NEED FOR INFLUENZA VACCINATION: ICD-10-CM

## 2019-10-16 DIAGNOSIS — I48.20 CHRONIC ATRIAL FIBRILLATION (HCC): ICD-10-CM

## 2019-10-16 DIAGNOSIS — I71.20 THORACIC AORTIC ANEURYSM WITHOUT RUPTURE: ICD-10-CM

## 2019-10-16 PROCEDURE — 1123F ACP DISCUSS/DSCN MKR DOCD: CPT | Performed by: FAMILY MEDICINE

## 2019-10-16 PROCEDURE — 90653 IIV ADJUVANT VACCINE IM: CPT | Performed by: FAMILY MEDICINE

## 2019-10-16 PROCEDURE — G8419 CALC BMI OUT NRM PARAM NOF/U: HCPCS | Performed by: FAMILY MEDICINE

## 2019-10-16 PROCEDURE — 1036F TOBACCO NON-USER: CPT | Performed by: FAMILY MEDICINE

## 2019-10-16 PROCEDURE — G8482 FLU IMMUNIZE ORDER/ADMIN: HCPCS | Performed by: FAMILY MEDICINE

## 2019-10-16 PROCEDURE — 99214 OFFICE O/P EST MOD 30 MIN: CPT | Performed by: FAMILY MEDICINE

## 2019-10-16 PROCEDURE — G8427 DOCREV CUR MEDS BY ELIG CLIN: HCPCS | Performed by: FAMILY MEDICINE

## 2019-10-16 PROCEDURE — G8598 ASA/ANTIPLAT THER USED: HCPCS | Performed by: FAMILY MEDICINE

## 2019-10-16 PROCEDURE — G0008 ADMIN INFLUENZA VIRUS VAC: HCPCS | Performed by: FAMILY MEDICINE

## 2019-10-16 PROCEDURE — 4040F PNEUMOC VAC/ADMIN/RCVD: CPT | Performed by: FAMILY MEDICINE

## 2019-10-16 RX ORDER — AMLODIPINE BESYLATE 10 MG/1
10 TABLET ORAL DAILY
Qty: 90 TABLET | Refills: 1 | Status: SHIPPED
Start: 2019-10-16 | End: 2020-02-14 | Stop reason: DRUGHIGH

## 2019-10-16 ASSESSMENT — ENCOUNTER SYMPTOMS
CONSTIPATION: 0
NAUSEA: 0
COUGH: 0
SHORTNESS OF BREATH: 0
WHEEZING: 0
RHINORRHEA: 0
DIARRHEA: 0
ABDOMINAL PAIN: 0
EYE DISCHARGE: 0
SORE THROAT: 0

## 2019-11-08 RX ORDER — ATORVASTATIN CALCIUM 10 MG/1
TABLET, FILM COATED ORAL
Qty: 90 TABLET | Refills: 1 | Status: SHIPPED | OUTPATIENT
Start: 2019-11-08 | End: 2019-11-08 | Stop reason: SDUPTHER

## 2019-11-11 RX ORDER — ATORVASTATIN CALCIUM 10 MG/1
TABLET, FILM COATED ORAL
Qty: 90 TABLET | Refills: 1 | Status: SHIPPED | OUTPATIENT
Start: 2019-11-11 | End: 2020-04-23 | Stop reason: SDUPTHER

## 2020-01-31 ENCOUNTER — OFFICE VISIT (OUTPATIENT)
Dept: CARDIOLOGY CLINIC | Age: 85
End: 2020-01-31
Payer: MEDICARE

## 2020-01-31 VITALS
HEART RATE: 75 BPM | SYSTOLIC BLOOD PRESSURE: 144 MMHG | WEIGHT: 172.8 LBS | BODY MASS INDEX: 24.74 KG/M2 | HEIGHT: 70 IN | DIASTOLIC BLOOD PRESSURE: 80 MMHG

## 2020-01-31 PROCEDURE — G8482 FLU IMMUNIZE ORDER/ADMIN: HCPCS | Performed by: NUCLEAR MEDICINE

## 2020-01-31 PROCEDURE — 1036F TOBACCO NON-USER: CPT | Performed by: NUCLEAR MEDICINE

## 2020-01-31 PROCEDURE — 93000 ELECTROCARDIOGRAM COMPLETE: CPT | Performed by: NUCLEAR MEDICINE

## 2020-01-31 PROCEDURE — 99213 OFFICE O/P EST LOW 20 MIN: CPT | Performed by: NUCLEAR MEDICINE

## 2020-01-31 PROCEDURE — G8427 DOCREV CUR MEDS BY ELIG CLIN: HCPCS | Performed by: NUCLEAR MEDICINE

## 2020-01-31 PROCEDURE — 1123F ACP DISCUSS/DSCN MKR DOCD: CPT | Performed by: NUCLEAR MEDICINE

## 2020-01-31 PROCEDURE — 4040F PNEUMOC VAC/ADMIN/RCVD: CPT | Performed by: NUCLEAR MEDICINE

## 2020-01-31 PROCEDURE — G8420 CALC BMI NORM PARAMETERS: HCPCS | Performed by: NUCLEAR MEDICINE

## 2020-01-31 RX ORDER — FLECAINIDE ACETATE 100 MG/1
TABLET ORAL
Qty: 180 TABLET | Refills: 3 | Status: SHIPPED | OUTPATIENT
Start: 2020-01-31 | End: 2021-01-14 | Stop reason: SDUPTHER

## 2020-01-31 NOTE — PROGRESS NOTES
[address] [facility],[place] [address]  [address] [facility] [address]  [facility] [address]  Dept: [phone]  Dept Fax: [phone]  Loc: [phone]    Visit Date: 1/31/2020    Acosta Minaya is a 80 y.o. male who presents todayfor:  Chief Complaint   Patient presents with    Check-Up    Atrial Fibrillation    Hypertension    Coronary Artery Disease     Known moderate CAD  Known mild AS  Known A fib   No chest pain  dizziness is better  BP is better now   Was higher at some time    HPI:  HPI  Past Medical History:   Diagnosis Date    A-fib ([address]     Bronchitis     CAD (coronary artery disease)     atrial fib     Chronic kidney disease     kidney stones     Hypertension     Stroke (cerebrum) ([address]     Thyroid disease     Trigeminal neuralgia       Past Surgical History:   Procedure Laterality Date    APPENDECTOMY  1951    BRAIN SURGERY  2003    CHOLECYSTECTOMY      COLONOSCOPY  2013    GALLBLADDER SURGERY  2010    KIDNEY STONE SURGERY  2003    MA Lauren Marmolejo 19 Bilateral 8/16/2018    robotic bilateral inguinal hernia repair with mesh performed by Jose Dodson MD at [address] [place] [address]  2003     Family History   Problem Relation Age of Onset    Heart Disease Mother     Heart Attack Mother     Diabetes Father     Heart Disease Father     High Blood Pressure Father     Cancer Brother     Diabetes Brother     Heart Disease Brother     Diabetes Brother     Stroke Brother      Social History     Tobacco Use    Smoking status: Never Smoker    Smokeless tobacco: Never Used   Substance Use Topics    Alcohol use: No      Current Outpatient Medications   Medication Sig Dispense Refill    atorvastatin (LIPITOR) 10 MG tablet TAKE ONE TABLET BY MOUTH EVERY DAY 90 tablet 1    amLODIPine (NORVASC) 10 MG tablet Take 1 tablet by mouth daily 90 tablet 1    levothyroxine (SYNTHROID) 25 MCG tablet Take 0.5 tablets by mouth Daily 45 tablet 3    XARELTO 20 MG TABS tablet TAKE ONE TABLET BY MOUTH EVERY DAY WITH BREAKFAST 90 tablet 3    flecainide (TAMBOCOR) 100 MG tablet TAKE ONE TABLET BY MOUTH TWICE A  tablet 3    MULTIPLE VITAMIN PO Take 1 tablet by mouth daily        No current facility-administered medications for this visit. Allergies   Allergen Reactions    Prednisone      Raised BP     Health Maintenance   Topic Date Due    DTaP/Tdap/Td vaccine (1 - Tdap) 02/05/1945    Annual Wellness Visit (AWV)  05/29/2019    Lipid screen  05/11/2020    TSH testing  09/17/2020    Flu vaccine  Completed    Shingles Vaccine  Completed    Pneumococcal 65+ years Vaccine  Completed       Subjective:  Review of Systems  General:   No fever, no chills, No fatigue or weight loss  Pulmonary:    No dyspnea, no wheezing  Cardiac:    Denies recent chest pain,   GI:     No nausea or vomiting, no abdominal pain  Neuro:    No dizziness or light headedness,   Musculoskeletal:  No recent active issues  Extremities:   No edema, no obvious claudication       Objective:  Physical Exam  BP (!) 144/80   Pulse 75   Ht 5' 10\" (1.778 m)   Wt 172 lb 12.8 oz (78.4 kg)   BMI 24.79 kg/m²   General:   Well developed, well nourished  Lungs:   Clear to auscultation  Heart:    Normal S1 S2, Slight murmur. no rubs, no gallops  Abdomen:   Soft, non tender, no organomegalies, positive bowel sounds  Extremities:   No edema, no cyanosis, good peripheral pulses  Neurological:   Awake, alert, oriented. No obvious focal deficits  Musculoskelatal:  No obvious deformities    Assessment:      Diagnosis Orders   1. Chronic atrial fibrillation  EKG 12 lead   2. Dizziness  EKG 12 lead   3. Coronary artery disease of native artery of native heart with stable angina pectoris ([address]  EKG 12 lead     cardiac fair for now  Plan:  No follow-ups on file. As above  Continue risk factor modification and medical management  Thank you for allowing me to participate in the care of your patient. Please don't hesitate to contact me regarding any further issues related to the patient care    Orders Placed:  Orders Placed This Encounter   Procedures    EKG 12 lead     Order Specific Question:   Reason for Exam?     Answer: Other       Medications Prescribed:  No orders of the defined types were placed in this encounter. Discussed use, benefit, and side effects of prescribed medications. All patient questions answered. Pt voicedunderstanding. Instructed to continue current medications, diet and exercise. Continue risk factor modification and medical management. Patient agreed with treatment plan. Follow up as directed.     Electronically signedby Mallorie Marquez MD on 1/31/2020 at 11:50 AM

## 2020-02-13 RX ORDER — VALSARTAN 160 MG/1
160 TABLET ORAL DAILY
Qty: 90 TABLET | Refills: 3 | Status: SHIPPED | OUTPATIENT
Start: 2020-02-13 | End: 2021-01-14 | Stop reason: SDUPTHER

## 2020-02-14 RX ORDER — AMLODIPINE BESYLATE 5 MG/1
5 TABLET ORAL DAILY
Qty: 30 TABLET | Refills: 4 | Status: SHIPPED | OUTPATIENT
Start: 2020-02-14 | End: 2020-02-17 | Stop reason: SDUPTHER

## 2020-02-14 RX ORDER — AMLODIPINE BESYLATE 5 MG/1
5 TABLET ORAL DAILY
COMMUNITY
End: 2020-02-14 | Stop reason: SDUPTHER

## 2020-02-17 RX ORDER — AMLODIPINE BESYLATE 5 MG/1
5 TABLET ORAL DAILY
Qty: 30 TABLET | Refills: 4 | Status: SHIPPED | OUTPATIENT
Start: 2020-02-17 | End: 2020-09-21 | Stop reason: SDUPTHER

## 2020-02-22 ENCOUNTER — APPOINTMENT (OUTPATIENT)
Dept: GENERAL RADIOLOGY | Age: 85
DRG: 554 | End: 2020-02-22
Payer: MEDICARE

## 2020-02-22 ENCOUNTER — HOSPITAL ENCOUNTER (INPATIENT)
Age: 85
LOS: 1 days | Discharge: HOME | DRG: 554 | End: 2020-02-23
Attending: EMERGENCY MEDICINE | Admitting: ORTHOPAEDIC SURGERY
Payer: MEDICARE

## 2020-02-22 PROBLEM — M25.012: Status: ACTIVE | Noted: 2020-02-22

## 2020-02-22 LAB
ALBUMIN SERPL-MCNC: 3.7 GM/DL (ref 3.4–5)
ALP BLD-CCNC: 93 U/L (ref 46–116)
ALT SERPL-CCNC: 23 U/L (ref 14–63)
ANION GAP: 7 MEQ/L (ref 8–16)
APTT: 33 SECONDS (ref 22–38)
AST SERPL-CCNC: 19 U/L (ref 15–37)
BILIRUB SERPL-MCNC: 0.4 MG/DL (ref 0.2–1)
BUN BLDV-MCNC: 17 MG/DL (ref 7–18)
CHLORIDE BLD-SCNC: 103 MEQ/L (ref 98–107)
CO2: 28 MEQ/L (ref 21–32)
CREAT SERPL-MCNC: 1 MG/DL (ref 0.6–1.3)
DIFFERENTIAL, MANUAL: NORMAL
GFR, ESTIMATED: 75 ML/MIN/1.73M2
GLUCOSE BLD-MCNC: 207 MG/DL (ref 74–106)
HCT VFR BLD CALC: 41.7 % (ref 42–52)
HEMOGLOBIN: 13.6 GM/DL (ref 14–18)
INR BLD: 1.46 (ref 0.85–1.13)
LYMPHOCYTES # BLD: 16 % (ref 15–47)
MACROCYTES: SLIGHT
MCH RBC QN AUTO: 32.7 PG (ref 27–31)
MCHC RBC AUTO-ENTMCNC: 32.5 GM/DL (ref 33–37)
MCV RBC AUTO: 100.6 FL (ref 80–94)
MONOCYTES: 5 % (ref 0–12)
PDW BLD-RTO: 11.9 % (ref 11.5–14.5)
PLATELET # BLD: 238 THOU/MM3 (ref 130–400)
PLATELET ESTIMATE: ADEQUATE
PMV BLD AUTO: 7.3 FL (ref 7.4–10.4)
POC CALCIUM: 9 MG/DL (ref 8.5–10.1)
POTASSIUM SERPL-SCNC: 4.1 MEQ/L (ref 3.5–5.1)
RBC # BLD: 4.15 MILL/MM3 (ref 4.7–6.1)
RBC # BLD: ABNORMAL 10*6/UL
SEGS: 79 % (ref 43–75)
SODIUM BLD-SCNC: 138 MEQ/L (ref 136–145)
TOTAL PROTEIN: 6.8 GM/DL (ref 6.4–8.2)
WBC # BLD: 14.2 THOU/MM3 (ref 4.8–10.8)

## 2020-02-22 PROCEDURE — 6360000002 HC RX W HCPCS: Performed by: EMERGENCY MEDICINE

## 2020-02-22 PROCEDURE — 36415 COLL VENOUS BLD VENIPUNCTURE: CPT

## 2020-02-22 PROCEDURE — 6370000000 HC RX 637 (ALT 250 FOR IP): Performed by: PHYSICIAN ASSISTANT

## 2020-02-22 PROCEDURE — 2709999900 HC NON-CHARGEABLE SUPPLY

## 2020-02-22 PROCEDURE — 80053 COMPREHEN METABOLIC PANEL: CPT

## 2020-02-22 PROCEDURE — 73020 X-RAY EXAM OF SHOULDER: CPT

## 2020-02-22 PROCEDURE — 99284 EMERGENCY DEPT VISIT MOD MDM: CPT

## 2020-02-22 PROCEDURE — 2500000003 HC RX 250 WO HCPCS: Performed by: EMERGENCY MEDICINE

## 2020-02-22 PROCEDURE — 85025 COMPLETE CBC W/AUTO DIFF WBC: CPT

## 2020-02-22 PROCEDURE — 85730 THROMBOPLASTIN TIME PARTIAL: CPT

## 2020-02-22 PROCEDURE — 1200000000 HC SEMI PRIVATE

## 2020-02-22 PROCEDURE — 85610 PROTHROMBIN TIME: CPT

## 2020-02-22 PROCEDURE — 2580000003 HC RX 258: Performed by: EMERGENCY MEDICINE

## 2020-02-22 PROCEDURE — 96372 THER/PROPH/DIAG INJ SC/IM: CPT

## 2020-02-22 PROCEDURE — 6370000000 HC RX 637 (ALT 250 FOR IP): Performed by: EMERGENCY MEDICINE

## 2020-02-22 PROCEDURE — 6360000002 HC RX W HCPCS

## 2020-02-22 RX ORDER — PROMETHAZINE HYDROCHLORIDE 25 MG/1
12.5 TABLET ORAL EVERY 6 HOURS PRN
Status: CANCELLED | OUTPATIENT
Start: 2020-02-22

## 2020-02-22 RX ORDER — CYCLOBENZAPRINE HCL 10 MG
10 TABLET ORAL 3 TIMES DAILY PRN
Status: DISCONTINUED | OUTPATIENT
Start: 2020-02-22 | End: 2020-02-23 | Stop reason: HOSPADM

## 2020-02-22 RX ORDER — MORPHINE SULFATE 4 MG/ML
4 INJECTION, SOLUTION INTRAMUSCULAR; INTRAVENOUS
Status: DISCONTINUED | OUTPATIENT
Start: 2020-02-22 | End: 2020-02-23 | Stop reason: HOSPADM

## 2020-02-22 RX ORDER — TRAMADOL HYDROCHLORIDE 50 MG/1
50 TABLET ORAL EVERY 4 HOURS PRN
Status: DISCONTINUED | OUTPATIENT
Start: 2020-02-22 | End: 2020-02-23 | Stop reason: HOSPADM

## 2020-02-22 RX ORDER — POLYETHYLENE GLYCOL 3350 17 G/17G
17 POWDER, FOR SOLUTION ORAL DAILY PRN
Status: CANCELLED | OUTPATIENT
Start: 2020-02-22

## 2020-02-22 RX ORDER — HYDROCODONE BITARTRATE AND ACETAMINOPHEN 5; 325 MG/1; MG/1
1 TABLET ORAL EVERY 4 HOURS PRN
Status: DISCONTINUED | OUTPATIENT
Start: 2020-02-22 | End: 2020-02-23 | Stop reason: HOSPADM

## 2020-02-22 RX ORDER — SODIUM CHLORIDE 0.9 % (FLUSH) 0.9 %
10 SYRINGE (ML) INJECTION EVERY 12 HOURS SCHEDULED
Status: CANCELLED | OUTPATIENT
Start: 2020-02-22

## 2020-02-22 RX ORDER — ACETAMINOPHEN 325 MG/1
650 TABLET ORAL EVERY 6 HOURS
Status: DISCONTINUED | OUTPATIENT
Start: 2020-02-22 | End: 2020-02-23 | Stop reason: HOSPADM

## 2020-02-22 RX ORDER — SODIUM CHLORIDE 0.9 % (FLUSH) 0.9 %
10 SYRINGE (ML) INJECTION PRN
Status: CANCELLED | OUTPATIENT
Start: 2020-02-22

## 2020-02-22 RX ORDER — ONDANSETRON 4 MG/1
4 TABLET, ORALLY DISINTEGRATING ORAL ONCE
Status: COMPLETED | OUTPATIENT
Start: 2020-02-22 | End: 2020-02-22

## 2020-02-22 RX ORDER — MORPHINE SULFATE 2 MG/ML
2 INJECTION, SOLUTION INTRAMUSCULAR; INTRAVENOUS
Status: DISCONTINUED | OUTPATIENT
Start: 2020-02-22 | End: 2020-02-23 | Stop reason: HOSPADM

## 2020-02-22 RX ORDER — ONDANSETRON 2 MG/ML
4 INJECTION INTRAMUSCULAR; INTRAVENOUS EVERY 6 HOURS PRN
Status: CANCELLED | OUTPATIENT
Start: 2020-02-22

## 2020-02-22 RX ORDER — HYDROCODONE BITARTRATE AND ACETAMINOPHEN 5; 325 MG/1; MG/1
2 TABLET ORAL EVERY 4 HOURS PRN
Status: DISCONTINUED | OUTPATIENT
Start: 2020-02-22 | End: 2020-02-23 | Stop reason: HOSPADM

## 2020-02-22 RX ADMIN — ONDANSETRON 4 MG: 4 TABLET, ORALLY DISINTEGRATING ORAL at 18:25

## 2020-02-22 RX ADMIN — CYCLOBENZAPRINE 10 MG: 10 TABLET, FILM COATED ORAL at 22:02

## 2020-02-22 RX ADMIN — HYDROCODONE BITARTRATE AND ACETAMINOPHEN 1 TABLET: 5; 325 TABLET ORAL at 22:02

## 2020-02-22 RX ADMIN — TRAMADOL HYDROCHLORIDE 50 MG: 50 TABLET, FILM COATED ORAL at 23:35

## 2020-02-22 RX ADMIN — TRANEXAMIC ACID 1000 MG: 100 INJECTION, SOLUTION INTRAVENOUS at 19:35

## 2020-02-22 RX ADMIN — HYDROMORPHONE HYDROCHLORIDE: 1 INJECTION, SOLUTION INTRAMUSCULAR; INTRAVENOUS; SUBCUTANEOUS at 18:18

## 2020-02-22 RX ADMIN — HYDROMORPHONE HYDROCHLORIDE 0.5 MG: 1 INJECTION, SOLUTION INTRAMUSCULAR; INTRAVENOUS; SUBCUTANEOUS at 18:18

## 2020-02-22 RX ADMIN — HYDROMORPHONE HYDROCHLORIDE 1 MG: 1 INJECTION, SOLUTION INTRAMUSCULAR; INTRAVENOUS; SUBCUTANEOUS at 19:16

## 2020-02-22 RX ADMIN — ACETAMINOPHEN 650 MG: 325 TABLET ORAL at 23:35

## 2020-02-22 ASSESSMENT — PAIN SCALES - GENERAL
PAINLEVEL_OUTOF10: 10
PAINLEVEL_OUTOF10: 10
PAINLEVEL_OUTOF10: 7
PAINLEVEL_OUTOF10: 7
PAINLEVEL_OUTOF10: 10
PAINLEVEL_OUTOF10: 10
PAINLEVEL_OUTOF10: 7

## 2020-02-22 ASSESSMENT — ENCOUNTER SYMPTOMS
ABDOMINAL PAIN: 0
BLOOD IN STOOL: 0
BACK PAIN: 0
WHEEZING: 0
DIARRHEA: 0
SHORTNESS OF BREATH: 0
SORE THROAT: 0

## 2020-02-22 ASSESSMENT — PAIN DESCRIPTION - ORIENTATION
ORIENTATION: LEFT

## 2020-02-22 ASSESSMENT — PAIN DESCRIPTION - LOCATION
LOCATION: SHOULDER

## 2020-02-22 ASSESSMENT — PAIN DESCRIPTION - FREQUENCY
FREQUENCY: CONTINUOUS
FREQUENCY: CONTINUOUS

## 2020-02-22 ASSESSMENT — PAIN DESCRIPTION - DESCRIPTORS: DESCRIPTORS: ACHING;SHARP;SHOOTING;STABBING

## 2020-02-22 ASSESSMENT — PAIN DESCRIPTION - PAIN TYPE
TYPE: ACUTE PAIN
TYPE: ACUTE PAIN

## 2020-02-22 ASSESSMENT — PAIN - FUNCTIONAL ASSESSMENT: PAIN_FUNCTIONAL_ASSESSMENT: PREVENTS OR INTERFERES SOME ACTIVE ACTIVITIES AND ADLS

## 2020-02-22 ASSESSMENT — PAIN DESCRIPTION - ONSET: ONSET: ON-GOING

## 2020-02-22 ASSESSMENT — PAIN DESCRIPTION - PROGRESSION: CLINICAL_PROGRESSION: NOT CHANGED

## 2020-02-22 NOTE — ED NOTES
PT. Assisted from private auto with c/o \"severe\" left shoulder pain, pt. Holding left shoulder and moaning. Spouse voices pt. Has upcoming appt. With Dr. Eleonora Enamorado to get \"cortisone injection\". PT. Has decreased ROM, good radial pulse, CMS intact. Pt. Has slight bruising noted to anterior left shoulder. Pt. Has been having chronic shoulder pain, but severe today after shutting car door.       William Bryson RN  02/22/20 6898 Venancio Yarbrough, GERA  02/22/20 [address] Lynsey Lawson  02/22/20 7848

## 2020-02-23 VITALS
SYSTOLIC BLOOD PRESSURE: 140 MMHG | DIASTOLIC BLOOD PRESSURE: 70 MMHG | RESPIRATION RATE: 18 BRPM | HEART RATE: 69 BPM | TEMPERATURE: 98.3 F | OXYGEN SATURATION: 94 %

## 2020-02-23 PROCEDURE — 6370000000 HC RX 637 (ALT 250 FOR IP): Performed by: PHYSICIAN ASSISTANT

## 2020-02-23 PROCEDURE — 99221 1ST HOSP IP/OBS SF/LOW 40: CPT | Performed by: NURSE PRACTITIONER

## 2020-02-23 PROCEDURE — 6370000000 HC RX 637 (ALT 250 FOR IP): Performed by: NURSE PRACTITIONER

## 2020-02-23 RX ORDER — ONDANSETRON 2 MG/ML
4 INJECTION INTRAMUSCULAR; INTRAVENOUS EVERY 6 HOURS PRN
Status: DISCONTINUED | OUTPATIENT
Start: 2020-02-23 | End: 2020-02-23 | Stop reason: HOSPADM

## 2020-02-23 RX ORDER — DOCUSATE SODIUM 100 MG/1
100 CAPSULE, LIQUID FILLED ORAL 2 TIMES DAILY
Qty: 60 CAPSULE | Refills: 0 | Status: SHIPPED | OUTPATIENT
Start: 2020-02-23 | End: 2020-03-24

## 2020-02-23 RX ORDER — HYDROCODONE BITARTRATE AND ACETAMINOPHEN 5; 325 MG/1; MG/1
1 TABLET ORAL EVERY 6 HOURS PRN
Qty: 12 TABLET | Refills: 0 | Status: SHIPPED | OUTPATIENT
Start: 2020-02-23 | End: 2020-02-26

## 2020-02-23 RX ORDER — AMLODIPINE BESYLATE 5 MG/1
5 TABLET ORAL DAILY
Status: DISCONTINUED | OUTPATIENT
Start: 2020-02-23 | End: 2020-02-23 | Stop reason: HOSPADM

## 2020-02-23 RX ORDER — PROMETHAZINE HYDROCHLORIDE 25 MG/1
12.5 TABLET ORAL EVERY 6 HOURS PRN
Status: DISCONTINUED | OUTPATIENT
Start: 2020-02-23 | End: 2020-02-23 | Stop reason: HOSPADM

## 2020-02-23 RX ORDER — SODIUM CHLORIDE 0.9 % (FLUSH) 0.9 %
10 SYRINGE (ML) INJECTION PRN
Status: DISCONTINUED | OUTPATIENT
Start: 2020-02-23 | End: 2020-02-23 | Stop reason: HOSPADM

## 2020-02-23 RX ORDER — SODIUM CHLORIDE 0.9 % (FLUSH) 0.9 %
10 SYRINGE (ML) INJECTION EVERY 12 HOURS SCHEDULED
Status: DISCONTINUED | OUTPATIENT
Start: 2020-02-23 | End: 2020-02-23 | Stop reason: HOSPADM

## 2020-02-23 RX ORDER — HYDRALAZINE HYDROCHLORIDE 20 MG/ML
10 INJECTION INTRAMUSCULAR; INTRAVENOUS EVERY 6 HOURS PRN
Status: DISCONTINUED | OUTPATIENT
Start: 2020-02-23 | End: 2020-02-23 | Stop reason: HOSPADM

## 2020-02-23 RX ORDER — CYCLOBENZAPRINE HCL 10 MG
10 TABLET ORAL 3 TIMES DAILY PRN
Qty: 30 TABLET | Refills: 0 | Status: SHIPPED | OUTPATIENT
Start: 2020-02-23 | End: 2020-03-04

## 2020-02-23 RX ORDER — ACETAMINOPHEN 325 MG/1
650 TABLET ORAL EVERY 6 HOURS
Qty: 112 TABLET | Refills: 0 | Status: SHIPPED | OUTPATIENT
Start: 2020-02-23 | End: 2020-07-08

## 2020-02-23 RX ORDER — FLECAINIDE ACETATE 50 MG/1
100 TABLET ORAL 2 TIMES DAILY
Status: DISCONTINUED | OUTPATIENT
Start: 2020-02-23 | End: 2020-02-23 | Stop reason: HOSPADM

## 2020-02-23 RX ORDER — CANDESARTAN 16 MG/1
16 TABLET ORAL DAILY
Status: DISCONTINUED | OUTPATIENT
Start: 2020-02-23 | End: 2020-02-23 | Stop reason: HOSPADM

## 2020-02-23 RX ORDER — VALSARTAN 160 MG/1
160 TABLET ORAL DAILY
Status: DISCONTINUED | OUTPATIENT
Start: 2020-02-23 | End: 2020-02-23 | Stop reason: CLARIF

## 2020-02-23 RX ORDER — LEVOTHYROXINE SODIUM 0.03 MG/1
12.5 TABLET ORAL DAILY
Status: DISCONTINUED | OUTPATIENT
Start: 2020-02-23 | End: 2020-02-23 | Stop reason: HOSPADM

## 2020-02-23 RX ORDER — ATORVASTATIN CALCIUM 10 MG/1
10 TABLET, FILM COATED ORAL DAILY
Status: DISCONTINUED | OUTPATIENT
Start: 2020-02-23 | End: 2020-02-23 | Stop reason: HOSPADM

## 2020-02-23 RX ADMIN — HYDROCODONE BITARTRATE AND ACETAMINOPHEN 1 TABLET: 5; 325 TABLET ORAL at 07:39

## 2020-02-23 RX ADMIN — LEVOTHYROXINE SODIUM 12.5 MCG: 25 TABLET ORAL at 05:43

## 2020-02-23 RX ADMIN — AMLODIPINE BESYLATE 5 MG: 5 TABLET ORAL at 09:21

## 2020-02-23 RX ADMIN — CANDESARTAN CILEXETIL 16 MG: 16 TABLET ORAL at 09:21

## 2020-02-23 RX ADMIN — CYCLOBENZAPRINE 10 MG: 10 TABLET, FILM COATED ORAL at 07:39

## 2020-02-23 RX ADMIN — TRAMADOL HYDROCHLORIDE 50 MG: 50 TABLET, FILM COATED ORAL at 09:21

## 2020-02-23 RX ADMIN — FLECAINIDE ACETATE 100 MG: 50 TABLET ORAL at 09:21

## 2020-02-23 ASSESSMENT — PAIN DESCRIPTION - ORIENTATION
ORIENTATION: LEFT
ORIENTATION: LEFT

## 2020-02-23 ASSESSMENT — PAIN SCALES - GENERAL
PAINLEVEL_OUTOF10: 5
PAINLEVEL_OUTOF10: 5
PAINLEVEL_OUTOF10: 7

## 2020-02-23 ASSESSMENT — PAIN DESCRIPTION - DESCRIPTORS
DESCRIPTORS: ACHING
DESCRIPTORS: ACHING

## 2020-02-23 ASSESSMENT — PAIN DESCRIPTION - PROGRESSION
CLINICAL_PROGRESSION: NOT CHANGED
CLINICAL_PROGRESSION: NOT CHANGED
CLINICAL_PROGRESSION: GRADUALLY IMPROVING
CLINICAL_PROGRESSION: NOT CHANGED

## 2020-02-23 ASSESSMENT — PAIN DESCRIPTION - PAIN TYPE
TYPE: ACUTE PAIN
TYPE: ACUTE PAIN

## 2020-02-23 ASSESSMENT — PAIN DESCRIPTION - LOCATION
LOCATION: SHOULDER
LOCATION: SHOULDER

## 2020-02-23 ASSESSMENT — PAIN - FUNCTIONAL ASSESSMENT
PAIN_FUNCTIONAL_ASSESSMENT: PREVENTS OR INTERFERES SOME ACTIVE ACTIVITIES AND ADLS
PAIN_FUNCTIONAL_ASSESSMENT: PREVENTS OR INTERFERES SOME ACTIVE ACTIVITIES AND ADLS

## 2020-02-23 ASSESSMENT — PAIN DESCRIPTION - FREQUENCY
FREQUENCY: CONTINUOUS
FREQUENCY: CONTINUOUS

## 2020-02-23 ASSESSMENT — PAIN DESCRIPTION - ONSET
ONSET: ON-GOING
ONSET: ON-GOING

## 2020-02-23 NOTE — ED NOTES
Dr. Darren Adam speaking to [address] [facility] telephone from [facility]. Bedside report given to CROW Parada.    Nehemiah Servin RN  02/22/20 46 Susan Diaz RN  02/22/20 9304

## 2020-02-23 NOTE — ED NOTES
Patient left via EMS family meeting patient in [facility].       Vidhya Jack RN  02/22/20 2561

## 2020-02-23 NOTE — PROGRESS NOTES
[address]  [address] [facility] [address]  Dept: [phone]  Loc: [phone]        Adult Orthopaedic Service      Patient Name:  Jayme Guzmán  YOB: 1934   MRN:  [id number]   Date: 02/23/20          Assessment:    s/p  left shoulder hemarthrosis on 2/22/2020 doing well    Plan:   -Continue with Norco and Flexeril as needed for pain control  -Discharge to home today  -Follow up with Dr Jose Wesley this week as an outpatient    Subjective:   Pain controlled, tolerating diet, Much improved pain and spasms. Slept comfortably.     Medications:      sodium chloride flush  10 mL Intravenous 2 times per day    levothyroxine  12.5 mcg Oral Daily    atorvastatin  10 mg Oral Daily    flecainide  100 mg Oral BID    amLODIPine  5 mg Oral Daily    candesartan  16 mg Oral Daily    acetaminophen  650 mg Oral Q6H       Physical Exam:     Vitals:    02/23/20 0327   BP: 138/72   Pulse: 80   Resp: 18   Temp: 97.9 °F (36.6 °C)   SpO2: 91%       Intake and Output Summary (Last 24 hours) at Date Time    Intake/Output Summary (Last 24 hours) at 2/23/2020 0708  Last data filed at 2/23/2020 0540  Gross per 24 hour   Intake 400 ml   Output --   Net 400 ml       General appearance - no acute distress  Musculoskeletal -    Fires AIN/PIN/U  SILT M/R/U  WWP fingers  2+ radial pulse  Calves soft, nontender bilateral    Labs:     CBC:   Lab Results   Component Value Date    WBC 14.2 02/22/2020    RBC 4.15 02/22/2020    HGB 13.6 02/22/2020    HCT 41.7 02/22/2020    .6 02/22/2020    MCH 32.7 02/22/2020    MCHC 32.5 02/22/2020    RDW 11.9 02/22/2020     02/22/2020    MPV 7.3 02/22/2020     BMP:    Lab Results   Component Value Date     02/22/2020    K 4.1 02/22/2020    K 4.6 07/30/2019     02/22/2020    CO2 28 02/22/2020    BUN 17 02/22/2020    LABALBU 3.7 02/22/2020    CREATININE 1.0 02/22/2020    CALCIUM 9.2 07/30/2019    LABGLOM >90

## 2020-02-23 NOTE — ED NOTES
Patient and family aware of admission to hospital and that we are waiting on a bed. Family wanted to take patient themselves, clarified with Dr. Satya Rosa due to the IV medication that would be infusing. Dr. Denise Talamantes explained that the patient would need to go by EMS, family stated understanding.       Carlos A Alvarez RN  02/22/20 2010

## 2020-02-23 NOTE — ED NOTES
Dilaudid given for pain, ice pack applied. Dr Georgina Argueta talked with Dr Lindsey Perla for hospital admission.      Binta Mitchell RN  02/22/20 2008

## 2020-02-23 NOTE — ED NOTES
Made EMS aware of transport to [address] [place] [address],[address], EMS verifying they can tranexamic acid.      Taiwo Mercado RN  02/22/20 2014

## 2020-02-23 NOTE — CONSULTS
Consult History & Physical      Patient:  Teresa Levin  YOB: 1934    MRN: [id number]     Acct: [de-identified]      Chief Complaint:  HTN    Date of Service: Pt seen/examined in consultation on 2/23/2020    ASSESSMENT / PLAN:    1. Uncontrolled HTN due to acute pain. Continue home ARB and CCB, will add holding parameters. Did not tolerate BB previously due to bradycardia. IV Hydralazine if SBP>170.  2. Left Shoulder Hemarthrosis ortho managing. Continue Ice/elevate, sling, pain medication, and muscle relaxer. 3. 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 Afebrile. Reactive   4. Chronic paroxysmal Afib rate controlled. EKG 01/2020 NSR. Continue home antiarrhythmic, restart Xarelto tomorrow. 5. CAD stable angina pectoris. Heart cath 9/2017 no significant stenosis medical management. Continue statin. 6. Hypothyroid TSH 9/2019 3.78 Continue Levothyroxine. 7. Hx dizziness Intermittently previous work up CT head negative. Holter monitor workup neg. MRI-L Mastoiditis tx atb. Vestibular tx.   8. Hx CVA no residual deficits   9. Thoracic aortic aneurysm without rupture medical management stable. Keep BP controlled to prevent rupture. 10. Trigeminal neuralia surgery 2003. Dispo: patient is medically stable. Will sign off care, call back PRN. History Of Present Illness:      80 y.o. male who we are asked to see/evaluate by Joseph Guo MD for medical management of AFIB, CAD, and hypertension. One episode at 2134 of uncontrolled /73, heart rate controlled in 80's. Patient and daughter at bedside report that other than brief elevation blood pressure the night was uneventful. Patient reports that his left shoulder was injured opening car door and has had pain since. Has hx L rotator cuff injury that was not surgical repaired and feels pain is related to that. Currently rates pain 7/10 local to the left arm, aching with no radiation.  Discussed that healing will take time and ice/elevation and the sling MG tablet Take 2 tablets by mouth every 6 hours for 14 days 2/23/20 3/8/20 Yes Jonathon Schwartz PA-C   amLODIPine (NORVASC) 5 MG tablet Take 1 tablet by mouth daily 2/17/20  Yes Marcus Castro MD   valsartan (DIOVAN) 160 MG tablet Take 1 tablet by mouth daily 2/13/20  Yes Marcus Castro MD   rivaroxaban (XARELTO) 20 MG TABS tablet Take 1 tablet by mouth Daily with supper 1/31/20  Yes Marcus Castro MD   flecainide (TAMBOCOR) 100 MG tablet TAKE ONE TABLET BY MOUTH TWICE A DAY 1/31/20  Yes Marcus Castro MD   atorvastatin (LIPITOR) 10 MG tablet TAKE ONE TABLET BY MOUTH EVERY DAY 11/11/19  Yes Marcus Castro MD   levothyroxine (SYNTHROID) 25 MCG tablet Take 0.5 tablets by mouth Daily 8/7/19  Yes Kevin Bhatt MD   MULTIPLE VITAMIN PO Take 1 tablet by mouth daily    Yes Historical Provider, MD       Allergies:  Prednisone    Social History:      The patient currently lives home with wife. TOBACCO:   reports that he has never smoked. He has never used smokeless tobacco.  ETOH:   reports no history of alcohol use. Family History:     Positive as follows:        Problem Relation Age of Onset    Heart Disease Mother     Heart Attack Mother     Diabetes Father     Heart Disease Father     High Blood Pressure Father     Cancer Brother     Diabetes Brother     Heart Disease Brother     Diabetes Brother     Stroke Brother        Diet:  DIET GENERAL;    REVIEW OF SYSTEMS:   Pertinent positives as noted in the HPI. All other systems reviewed and negative. PHYSICAL EXAM:  /72   Pulse 80   Temp 97.9 °F (36.6 °C) (Oral)   Resp 18   SpO2 91%   General appearance: No apparent distress, appears stated age and cooperative. HEENT: Normal cephalic, atraumatic without obvious deformity. Pupils equal, round, and reactive to light. Extra ocular muscles intact. Conjunctivae/corneas clear. [place]. Neck: Supple, with full range of motion. No jugular venous distention.  Trachea

## 2020-02-23 NOTE — H&P
Ortho H&P/Consult  Patient:  Leah Purcell  YOB: 1934  MRN: [id number]     Acct: [de-identified]    PCP: Spencer Fisher MD  Date of Admission: 2/22/2020  Date of Service: Pt seen/examined on 2/23/2020     Chief Complaint: left shoulder hemarthrosis  History Of Present Illness: 80 y.o. male who presents with left shoulder pain and swelling after pulling his car door shut earlier today. Patient with known history of partial rotator cuff tear. Due to significant pain and swelling patient was taken to [facility] and after discussion with Dr Satya Draper and ED physician,  patient was admitted for observation to Dr Marely De La Cruz service for pain control. Patient ambulation status: no difficulty. Antiplatelets/Anticoagulation includes: warfarin. Hx from chart and/or Pt.and patient's daughter. Past Medical History:        Diagnosis Date    A-fib [facility])     Bronchitis     CAD (coronary artery disease)     atrial fib     Hypertension     Stroke (cerebrum) ([address]     Thyroid disease     Trigeminal neuralgia        Past Surgical History:        Procedure Laterality Date    APPENDECTOMY  1951   Beto Duran BRAIN SURGERY  2003    CHOLECYSTECTOMY      COLONOSCOPY  2013    CRANIOTOMY  2003    GALLBLADDER SURGERY  2010    KIDNEY STONE SURGERY  2003    GA LAP,INGUINAL HERNIA REPR,INITIAL Bilateral 8/16/2018    robotic bilateral inguinal hernia repair with mesh performed by Jie Zeng MD at [facility]  2003       Home Medications:   Prior to Admission medications    Medication Sig Start Date End Date Taking?  Authorizing Provider   amLODIPine (NORVASC) 5 MG tablet Take 1 tablet by mouth daily 2/17/20  Yes Chante Underwood MD   valsartan (DIOVAN) 160 MG tablet Take 1 tablet by mouth daily 2/13/20  Yes Chante Underwood MD   rivaroxaban (XARELTO) 20 MG TABS tablet Take 1 tablet by mouth Daily with supper 1/31/20  Yes Chante Underwood MD   flecainide [facility]) 100 MG tablet TAKE ONE TABLET BY MOUTH TWICE A DAY 1/31/20  Yes Romina Block MD   atorvastatin (LIPITOR) 10 MG tablet TAKE ONE TABLET BY MOUTH EVERY DAY 11/11/19  Yes Romina Block MD   levothyroxine (SYNTHROID) 25 MCG tablet Take 0.5 tablets by mouth Daily 8/7/19  Yes Marylou Toledo MD   MULTIPLE VITAMIN PO Take 1 tablet by mouth daily    Yes Historical Provider, MD       Current Hospital Medications:    Current Facility-Administered Medications:     sodium chloride flush 0.9 % injection 10 mL, 10 mL, Intravenous, 2 times per day, Thuy Garcia PA-C    sodium chloride flush 0.9 % injection 10 mL, 10 mL, Intravenous, PRN, Thuy Garcia PA-C    promethazine (PHENERGAN) tablet 12.5 mg, 12.5 mg, Oral, Q6H PRN, Thuy Garcia PA-C    ondansetron (ZOFRAN) injection 4 mg, 4 mg, Intravenous, Q6H PRN, Thuy Garcia PA-C    levothyroxine (SYNTHROID) tablet 12.5 mcg, 12.5 mcg, Oral, Daily, Thuy Garcia PA-C    atorvastatin (LIPITOR) tablet 10 mg, 10 mg, Oral, Daily, Thuy Garcia PA-C    flecainide (TAMBOCOR) tablet 100 mg, 100 mg, Oral, BID, Thuy Garcia PA-C    amLODIPine (NORVASC) tablet 5 mg, 5 mg, Oral, Daily, Thuy Garcia PA-C    candesartan (ATACAND) tablet 16 mg, 16 mg, Oral, Daily, Thuy Garcia PA-C    traMADol (ULTRAM) tablet 50 mg, 50 mg, Oral, Q4H PRN, Thuy Garcia PA-C, 50 mg at 02/22/20 2255    HYDROcodone-acetaminophen (NORCO) 5-325 MG per tablet 1 tablet, 1 tablet, Oral, Q4H PRN, 1 tablet at 02/22/20 2202 **OR** HYDROcodone-acetaminophen (NORCO) 5-325 MG per tablet 2 tablet, 2 tablet, Oral, Q4H PRN, Thuy Garcia PA-C    acetaminophen (TYLENOL) tablet 650 mg, 650 mg, Oral, Q6H, Thuy Garcia PA-C, 650 mg at 02/22/20 4444    morphine (PF) injection 2 mg, 2 mg, Intravenous, Q2H PRN **OR** morphine injection 4 mg, 4 mg, Intravenous, Q2H PRN, Aston Guido, tenderness to palpation over clavicle/shoulder/humerus/elbow/forearm/distal radius/hand. Notable swelling anterior shoulder. TTP anterior shoulder. ROM significantly limited by pain and decreased strength. Right Lower Extremity:  No obvious pain or deformity to inspection with normal joint range of motion, stability, and muscle strength except noted below. DP/PT 2+. No Lymphedema. Skin intact except where noted below. No tenderness to palpation over hip/knee/tibia/medial mal/lateral mal/calcaneus/midfoot/forefoot. Sensation intact to light touch in the superficial peroneal, deep peroneal, tibial, sural, saphenous nerve distributions. Motor function of tibialis anterior, extensor hallucis longus, and gactrocsoleus complex intact. Left Lower Extremity:  No obvious pain or deformity to inspection with normal joint range of motion, stability, and muscle strength except noted below. DP/PT 2+. No Lymphedema. Skin intact except where noted below. No tenderness to palpation over hip/knee/tibia/medial mal/lateral mal/calcaneus/midfoot/forefoot. Sensation intact to light touch in the superficial peroneal, deep peroneal, tibial, sural, saphenous nerve distributions. Motor function of tibialis anterior, extensor hallucis longus, and gactrocsoleus complex intact. Labs:   CBC:   Lab Results   Component Value Date    WBC 14.2 02/22/2020    RBC 4.15 02/22/2020     BMP:  Lab Results   Component Value Date    GLUCOSE 207 02/22/2020    GLUCOSE 98 07/26/2018    CO2 28 02/22/2020    BUN 17 02/22/2020    CREATININE 1.0 02/22/2020    CALCIUM 9.2 07/30/2019     PT/INR:   Lab Results   Component Value Date    INR 1.46 02/22/2020    APTT 33.0 02/22/2020     Type and Screen: No results found for: LABABO, RH, LABANTI  CRP: No results found for: CRP  ESR: No results found for: SEDRATE  HgBA1c:  No results found for: LABA1C  The above labs were reviewed by me.     Radiology:   XR: Left shoulder -No fracture or dislocation in a limited single view study. Radiology report reviewed by Dr Satya Draper and myself. ASSESSMENT:  80 y.o. male with left shoulder hemarthrosis    PLAN:    -Patient seen and examined by Dr Satya Draper and myself  -Added multimodal pain control and flexeril  -Ice/Elevate  -Admit to observation for Dr Satya Draper  -Please hold Xarelto for dose on 2/23/2020  -At patient request follow up as an outpatient with Dr Moe Simeon this week    Patient seen and examined independently by me. Above discussed and I agree with above note except where indicated in the EMR revision history. Also see my additional comments and changes indicated by discrete font, text color, italics, and/or initials. Labs, cultures, and radiographs where available were reviewed. Changes were made in the orders as necessary. I discussed patient concerns with Leslie LOPEZ and instructions were given. Please see our orders for the updated patient care plan. 80y male with probable acute rotator cuff tear. Will plan for pain control and discharge when stable. Patient has relationship with Dr Moe Simeon and will follow up with in as outpatient.

## 2020-02-24 ENCOUNTER — TELEPHONE (OUTPATIENT)
Dept: FAMILY MEDICINE CLINIC | Age: 85
End: 2020-02-24

## 2020-02-24 NOTE — TELEPHONE ENCOUNTER
[facility] [address] Transitions Initial Follow Up Call    Outreach made within 2 business days of discharge: Yes    Patient: Silvio Escamilla Patient : 1934   MRN: [id number]  Reason for Admission: There are no discharge diagnoses documented for the most recent discharge. Discharge Date: 20       Spoke with: Patient     Discharge department/facility: [facility]    TCM Interactive Patient Contact:  Was patient able to fill all prescriptions: Yes  Was patient instructed to bring all medications to the follow-up visit: Yes  Is patient taking all medications as directed in the discharge summary? Yes  Does patient understand their discharge instructions: Yes  Does patient have questions or concerns that need addressed prior to 7-14 day follow up office visit: no    Scheduled appointment with PCP within 7-14 days    Follow Up  Future Appointments   Date Time Provider Flower Magallanes   2020 10:15 AM Marcus Castro MD [facility]     Patient declined appointment as he is following up with Dr. Isac Wan.    NISA Demarco (AMAL[place])

## 2020-02-24 NOTE — DISCHARGE SUMMARY
rivaroxaban 20 MG Tabs tablet  Commonly known as:  Xarelto  Take 1 tablet by mouth Daily with supper     valsartan 160 MG tablet  Commonly known as:  Diovan  Take 1 tablet by mouth daily           Where to Get Your Medications      You can get these medications from any pharmacy    Bring a paper prescription for each of these medications  · acetaminophen 325 MG tablet  · cyclobenzaprine 10 MG tablet  · docusate sodium 100 MG capsule  · HYDROcodone-acetaminophen 5-325 MG per tablet         Patient Instructions: The patient will notify me for any increased bleeding, drainage, or progressively worsening pain, or other concerning symptoms. They have been instructed to report to the emergency room immediately for any chest pain or shortness of breath. Activity Precautions:   -Weight Bearing as Tolerated left UE. Sling as needed for comfort. Patient was provided with appropriate analgesia medication.      Follow-up visit with Dr Marshall Renae this week    Signed:  Janelle Moreira  2/23/2020  9:10 PM

## 2020-02-27 ENCOUNTER — HOSPITAL ENCOUNTER (OUTPATIENT)
Dept: MRI IMAGING | Age: 85
Discharge: HOME | End: 2020-02-27
Payer: MEDICARE

## 2020-02-27 PROCEDURE — 73221 MRI JOINT UPR EXTREM W/O DYE: CPT

## 2020-03-05 ENCOUNTER — TELEPHONE (OUTPATIENT)
Dept: CARDIOLOGY CLINIC | Age: 85
End: 2020-03-05

## 2020-03-05 NOTE — TELEPHONE ENCOUNTER
Pre op Risk Assessment    Procedure Dental Extractions  Physician Pure Smijose luis  Date of surgery/procedure 3/9/20    Last OV 1-31-20  Last Stress 11-4-14  Last Echo 7-24-19  Last Cath 9-15-17  Last Stent None In Epic  Is patient on blood thinners Xarelto  Hold Meds/how many days ?     Fax- [phone]

## 2020-03-09 ENCOUNTER — TELEPHONE (OUTPATIENT)
Dept: CARDIOLOGY CLINIC | Age: 85
End: 2020-03-09

## 2020-03-09 RX ORDER — RIVAROXABAN 20 MG/1
TABLET, FILM COATED ORAL
Qty: 90 TABLET | Refills: 1 | Status: SHIPPED | OUTPATIENT
Start: 2020-03-09 | End: 2021-01-14 | Stop reason: SDUPTHER

## 2020-04-23 RX ORDER — ATORVASTATIN CALCIUM 10 MG/1
TABLET, FILM COATED ORAL
Qty: 90 TABLET | Refills: 0 | Status: SHIPPED | OUTPATIENT
Start: 2020-04-23 | End: 2020-07-08 | Stop reason: SDUPTHER

## 2020-07-08 ENCOUNTER — OFFICE VISIT (OUTPATIENT)
Dept: CARDIOLOGY CLINIC | Age: 85
End: 2020-07-08
Payer: MEDICARE

## 2020-07-08 VITALS
DIASTOLIC BLOOD PRESSURE: 64 MMHG | HEIGHT: 70 IN | WEIGHT: 170 LBS | SYSTOLIC BLOOD PRESSURE: 130 MMHG | BODY MASS INDEX: 24.34 KG/M2 | HEART RATE: 64 BPM

## 2020-07-08 PROCEDURE — 4040F PNEUMOC VAC/ADMIN/RCVD: CPT | Performed by: NUCLEAR MEDICINE

## 2020-07-08 PROCEDURE — 1036F TOBACCO NON-USER: CPT | Performed by: NUCLEAR MEDICINE

## 2020-07-08 PROCEDURE — 1123F ACP DISCUSS/DSCN MKR DOCD: CPT | Performed by: NUCLEAR MEDICINE

## 2020-07-08 PROCEDURE — 99214 OFFICE O/P EST MOD 30 MIN: CPT | Performed by: NUCLEAR MEDICINE

## 2020-07-08 PROCEDURE — G8427 DOCREV CUR MEDS BY ELIG CLIN: HCPCS | Performed by: NUCLEAR MEDICINE

## 2020-07-08 PROCEDURE — G8420 CALC BMI NORM PARAMETERS: HCPCS | Performed by: NUCLEAR MEDICINE

## 2020-07-08 RX ORDER — ATORVASTATIN CALCIUM 10 MG/1
TABLET, FILM COATED ORAL
Qty: 90 TABLET | Refills: 3 | Status: SHIPPED | OUTPATIENT
Start: 2020-07-08 | End: 2021-07-20 | Stop reason: SDUPTHER

## 2020-07-08 NOTE — PROGRESS NOTES
[address]  [address] [place] [address]  Dept: [phone]  Dept Fax: [phone]  Loc: [phone]    Visit Date: 7/8/2020    Zo Archer is a 80 y.o. male who presents todayfor:  Chief Complaint   Patient presents with    Check-Up    Coronary Artery Disease    Atrial Fibrillation    Hypertension    Hyperlipidemia     Dealing with some visual issues  ?/ blood flow   A fib is stable for now  No bleeding   Ascending aneurysm is stable   BP is better  No dizziness  No syncope  Known moderate CAD  As is stable  Sent by eye doctor   Sent for evaluation   No chest pain   No bleeding   On statins    HPI:  HPI  Past Medical History:   Diagnosis Date    A-fib ([address]     Bronchitis     CAD (coronary artery disease)     atrial fib     Hypertension     Stroke (cerebrum) ([facility] [address]     Thyroid disease     Trigeminal neuralgia       Past Surgical History:   Procedure Laterality Date    APPENDECTOMY  1951   [facility] BRAIN SURGERY  2003    CHOLECYSTECTOMY      COLONOSCOPY  2013    CRANIOTOMY  2003    GALLBLADDER SURGERY  2010    KIDNEY STONE SURGERY  2003    MI Lauren [address] 19 Bilateral 8/16/2018    robotic bilateral inguinal hernia repair with mesh performed by Ashlyn Lawson MD at [address] [place] [address]  2003     Family History   Problem Relation Age of Onset    Heart Disease Mother     Heart Attack Mother     Diabetes Father     Heart Disease Father     High Blood Pressure Father     Cancer Brother     Diabetes Brother     Heart Disease Brother     Diabetes Brother     Stroke Brother      Social History     Tobacco Use    Smoking status: Never Smoker    Smokeless tobacco: Never Used   Substance Use Topics    Alcohol use: No      Current Outpatient Medications   Medication Sig Dispense Refill    atorvastatin (LIPITOR) 10 MG tablet TAKE ONE TABLET BY MOUTH EVERY DAY 90 tablet 0    XARELTO 20 MG TABS tablet TAKE ONE TABLET BY MOUTH EVERY DAY WITH BREAKFAST 90 tablet 1    amLODIPine (NORVASC) 5 MG tablet Take 1 tablet by mouth daily 30 tablet 4    valsartan (DIOVAN) 160 MG tablet Take 1 tablet by mouth daily 90 tablet 3    flecainide (TAMBOCOR) 100 MG tablet TAKE ONE TABLET BY MOUTH TWICE A  tablet 3    levothyroxine (SYNTHROID) 25 MCG tablet Take 0.5 tablets by mouth Daily 45 tablet 3    MULTIPLE VITAMIN PO Take 1 tablet by mouth daily        No current facility-administered medications for this visit. Allergies   Allergen Reactions    Prednisone      Raised BP     Health Maintenance   Topic Date Due    DTaP/Tdap/Td vaccine (1 - Tdap) 02/05/1953    Annual Wellness Visit (AWV)  05/29/2019    Lipid screen  05/11/2020    Flu vaccine (1) 09/01/2020    TSH testing  09/17/2020    Potassium monitoring  02/22/2021    Creatinine monitoring  02/22/2021    Shingles Vaccine  Completed    Pneumococcal 65+ years Vaccine  Completed    Hepatitis A vaccine  Aged Out    Hepatitis B vaccine  Aged Out    Hib vaccine  Aged Out    Meningococcal (ACWY) vaccine  Aged Out       Subjective:  Review of Systems  General:   No fever, no chills, No fatigue or weight loss  Pulmonary:    some dyspnea, no wheezing  Cardiac:    Denies recent chest pain,   GI:     No nausea or vomiting, no abdominal pain  Neuro:     No dizziness or light headedness,   Musculoskeletal:  No recent active issues  Extremities:   No edema, no obvious claudication       Objective:  Physical Exam  /64   Pulse 64   Ht 5' 10\" (1.778 m)   Wt 170 lb (77.1 kg)   BMI 24.39 kg/m²   General:   Well developed, well nourished  Lungs:    Clear to auscultation  Heart:    Normal S1 S2, Slight murmur. no rubs, no gallops  Abdomen:   Soft, non tender, no organomegalies, positive bowel sounds  Extremities:   No edema, no cyanosis, good peripheral pulses  Neurological:   Awake, alert, oriented.  No obvious focal deficits  Musculoskelatal:  No obvious deformities    Assessment:      Diagnosis Orders   1. Coronary artery disease involving native coronary artery of native heart without angina pectoris     2. Ascending aortic aneurysm ([address]     3. Essential hypertension     4. Familial hypercholesterolemia     ?/ PVD   Needs a follow up on his As and aneurysm       Plan:  No follow-ups on file. Echo and carotids  Follow up CTA  Continue risk factor modification and medical management  Thank you for allowing me to participate in the care of your patient. Please don't hesitate to contact me regarding any further issues related to the patient care    Orders Placed:  No orders of the defined types were placed in this encounter. Medications Prescribed:  No orders of the defined types were placed in this encounter. Discussed use, benefit, and side effects of prescribed medications. All patient questions answered. Pt voicedunderstanding. Instructed to continue current medications, diet and exercise. Continue risk factor modification and medical management. Patient agreed with treatment plan. Follow up as directed.     Electronically signedby Agustin Pickett MD on 7/8/2020 at 10:22 AM

## 2020-07-16 ENCOUNTER — HOSPITAL ENCOUNTER (OUTPATIENT)
Dept: INTERVENTIONAL RADIOLOGY/VASCULAR | Age: 85
Discharge: HOME | End: 2020-07-16
Payer: MEDICARE

## 2020-07-16 ENCOUNTER — HOSPITAL ENCOUNTER (OUTPATIENT)
Dept: NON INVASIVE DIAGNOSTICS | Age: 85
Discharge: HOME | End: 2020-07-16
Payer: MEDICARE

## 2020-07-16 LAB
LV EF: 60 %
LVEF MODALITY: NORMAL

## 2020-07-16 PROCEDURE — 93880 EXTRACRANIAL BILAT STUDY: CPT

## 2020-07-16 PROCEDURE — 93306 TTE W/DOPPLER COMPLETE: CPT

## 2020-07-27 RX ORDER — LEVOTHYROXINE SODIUM 0.03 MG/1
12.5 TABLET ORAL DAILY
Qty: 45 TABLET | Refills: 3 | Status: SHIPPED | OUTPATIENT
Start: 2020-07-27 | End: 2021-07-20 | Stop reason: SDUPTHER

## 2020-08-24 ENCOUNTER — TELEPHONE (OUTPATIENT)
Dept: FAMILY MEDICINE CLINIC | Age: 85
End: 2020-08-24

## 2020-08-24 NOTE — TELEPHONE ENCOUNTER
Patient's wife Daiana Cervantes called. She is wanting to know if we can order a hearing test for patient with out seeing him. He was last seen on 10/19/2020. He has developed some tinnitus.     Would like a referral for a hearing test. Please let me know if you would like to see patient first.

## 2020-08-24 NOTE — TELEPHONE ENCOUNTER
I would like to see patient to make sure he doesn't have cerumen impaction or another cause to the hearing loss. If ear exam is normal, then we can refer for hearing test at that time.  thanks

## 2020-08-27 ENCOUNTER — OFFICE VISIT (OUTPATIENT)
Dept: FAMILY MEDICINE CLINIC | Age: 85
End: 2020-08-27
Payer: MEDICARE

## 2020-08-27 VITALS
WEIGHT: 172 LBS | BODY MASS INDEX: 24.62 KG/M2 | OXYGEN SATURATION: 95 % | HEIGHT: 70 IN | TEMPERATURE: 98.1 F | DIASTOLIC BLOOD PRESSURE: 62 MMHG | SYSTOLIC BLOOD PRESSURE: 110 MMHG | HEART RATE: 84 BPM | RESPIRATION RATE: 14 BRPM

## 2020-08-27 PROBLEM — D68.9 COAGULOPATHY (HCC): Status: ACTIVE | Noted: 2020-08-27

## 2020-08-27 PROCEDURE — 69210 REMOVE IMPACTED EAR WAX UNI: CPT | Performed by: FAMILY MEDICINE

## 2020-08-27 PROCEDURE — 4040F PNEUMOC VAC/ADMIN/RCVD: CPT | Performed by: FAMILY MEDICINE

## 2020-08-27 PROCEDURE — G0439 PPPS, SUBSEQ VISIT: HCPCS | Performed by: FAMILY MEDICINE

## 2020-08-27 PROCEDURE — 1123F ACP DISCUSS/DSCN MKR DOCD: CPT | Performed by: FAMILY MEDICINE

## 2020-08-27 ASSESSMENT — PATIENT HEALTH QUESTIONNAIRE - PHQ9
SUM OF ALL RESPONSES TO PHQ QUESTIONS 1-9: 1
2. FEELING DOWN, DEPRESSED OR HOPELESS: 1
SUM OF ALL RESPONSES TO PHQ9 QUESTIONS 1 & 2: 1
SUM OF ALL RESPONSES TO PHQ QUESTIONS 1-9: 1
1. LITTLE INTEREST OR PLEASURE IN DOING THINGS: 0

## 2020-08-27 NOTE — PATIENT INSTRUCTIONS
Personalized Preventive Plan for Rebekah Barba - 8/27/2020  Medicare offers a range of preventive health benefits. Some of the tests and screenings are paid in full while other may be subject to a deductible, co-insurance, and/or copay. Some of these benefits include a comprehensive review of your medical history including lifestyle, illnesses that may run in your family, and various assessments and screenings as appropriate. After reviewing your medical record and screening and assessments performed today your provider may have ordered immunizations, labs, imaging, and/or referrals for you. A list of these orders (if applicable) as well as your Preventive Care list are included within your After Visit Summary for your review. Other Preventive Recommendations:    · A preventive eye exam performed by an eye specialist is recommended every 1-2 years to screen for glaucoma; cataracts, macular degeneration, and other eye disorders. · A preventive dental visit is recommended every 6 months. · Try to get at least 150 minutes of exercise per week or 10,000 steps per day on a pedometer . · Order or download the FREE \"Exercise & Physical Activity: Your Everyday Guide\" from [employer]. Call 5-900.939.4266 or search [employer] online. · You need 0783-9000 mg of calcium and 0510-1376 IU of vitamin D per day. It is possible to meet your calcium requirement with diet alone, but a vitamin D supplement is usually necessary to meet this goal.  · When exposed to the sun, use a sunscreen that protects against both UVA and UVB radiation with an SPF of 30 or greater. Reapply every 2 to 3 hours or after sweating, drying off with a towel, or swimming. · Always wear a seat belt when traveling in a car. Always wear a helmet when riding a bicycle or motorcycle.

## 2020-08-27 NOTE — PROGRESS NOTES
murmurs, rubs, clicks, or gallops, distal pulses intact, no carotid bruits  Abdomen: soft, non-tender, non-distended, normal bowel sounds, no masses or organomegaly  Extremities: no cyanosis, clubbing or edema  Musculoskeletal: normal range of motion, no joint swelling, deformity or tenderness  Neurologic: reflexes normal and symmetric, no cranial nerve deficit, gait, coordination and speech normal    Patient's complete Health Risk Assessment and screening values have been reviewed and are found in Flowsheets. The following problems were reviewed today and where indicated follow up appointments were made and/or referrals ordered. Positive Risk Factor Screenings with Interventions:     Health Habits/Nutrition:  Health Habits/Nutrition  Do you exercise for at least 20 minutes 2-3 times per week?: Yes  Have you lost any weight without trying in the past 3 months?: No  Do you eat fewer than 2 meals per day?: No  Have you seen a dentist within the past year?: (!) No(Appointment cancelled due to pandemic)  Body mass index is 24.68 kg/m². Health Habits/Nutrition Interventions:  · Dental exam overdue:  patient encouraged to make appointment with his/her dentist    Hearing/Vision:  No exam data present  Hearing/Vision  Do you or your family notice any trouble with your hearing?: (!) Yes  Do you have difficulty driving, watching TV, or doing any of your daily activities because of your eyesight?: (!) Yes  Have you had an eye exam within the past year?: Yes  Hearing/Vision Interventions:  · Hearing concerns:  cerumen impaction noted today.  Ears flushed  · Vision concerns:  patient encouraged to make appointment with his/her eye specialist    Personalized Preventive Plan   Current Health Maintenance Status  Immunization History   Administered Date(s) Administered    Influenza, High Dose (Fluzone 65 yrs and older) 10/12/2015, 10/07/2017, 09/13/2018    Influenza, Triv, inactivated, subunit, adjuvanted, IM (Fluad 65 yrs and older) 10/16/2019    Pneumococcal Conjugate 13-valent ([id number]) 10/14/2015    Pneumococcal Polysaccharide ([id number]) 09/18/2014    Zoster Live (Zostavax) 01/28/2016    Zoster Recombinant (Shingrix) 06/11/2019, 08/14/2019        Health Maintenance   Topic Date Due    DTaP/Tdap/Td vaccine (1 - Tdap) 02/05/1953    Annual Wellness Visit (AWV)  05/29/2019    Lipid screen  05/11/2020    Flu vaccine (1) 09/01/2020    TSH testing  09/17/2020    Potassium monitoring  02/22/2021    Creatinine monitoring  02/22/2021    Shingles Vaccine  Completed    Pneumococcal 65+ years Vaccine  Completed    Hepatitis A vaccine  Aged Out    Hepatitis B vaccine  Aged Out    Hib vaccine  Aged Out    Meningococcal (ACWY) vaccine  Aged Out     Recommendations for [employer] Due: see orders and patient instructions/AVS.  . Recommended screening schedule for the next 5-10 years is provided to the patient in written form: see Patient Instructions/AVS.    Damari Young was seen today for medicare awv and tinnitus. Diagnoses and all orders for this visit:    Routine general medical examination at a health care facility    Coagulopathy [facility])  -chronic, stable, on xarelto  Coronary artery disease of native artery of native heart with stable angina pectoris ([address]  -     Lipid Panel; Future    Tinnitus of both ears  -     [facility] Lia[facility]  Ears flushed. Ear Cerumen Removal Procedure Note    Indication: ear cerumen impaction    Procedure: After placing the patient's head in the appropriate position, the patient's right ear canal was irrigated with the appropriate solution and curetted until all cerumen was removed and the ear canal was clear. The other ear canal also had cerumen present and was irrigated with the appropriate solution and curetted until all cerumen was removed and the ear canal was clear. At this point the procedure was complete. The patient tolerated the procedure well.     Complications: None        Ears cleaned today. Will refer today audiology for further evaluation of hearing loss. Fasting blood work ordered today. Follow up in 1 year for SUSANNE.

## 2020-08-28 ENCOUNTER — HOSPITAL ENCOUNTER (OUTPATIENT)
Age: 85
Discharge: HOME | End: 2020-08-28
Payer: MEDICARE

## 2020-08-28 DIAGNOSIS — I25.118 CORONARY ARTERY DISEASE OF NATIVE ARTERY OF NATIVE HEART WITH STABLE ANGINA PECTORIS (HCC): ICD-10-CM

## 2020-08-28 LAB
CHOLESTEROL, TOTAL: 122 MG/DL (ref 100–199)
HDLC SERPL-MCNC: 43 MG/DL
LDL CHOLESTEROL CALCULATED: 59 MG/DL
TRIGL SERPL-MCNC: 102 MG/DL (ref 0–199)

## 2020-08-28 PROCEDURE — 80061 LIPID PANEL: CPT

## 2020-08-28 PROCEDURE — 36415 COLL VENOUS BLD VENIPUNCTURE: CPT

## 2020-09-21 RX ORDER — AMLODIPINE BESYLATE 5 MG/1
5 TABLET ORAL DAILY
Qty: 30 TABLET | Refills: 6 | Status: SHIPPED | OUTPATIENT
Start: 2020-09-21 | End: 2021-06-01 | Stop reason: SDUPTHER

## 2020-10-01 ENCOUNTER — HOSPITAL ENCOUNTER (OUTPATIENT)
Dept: AUDIOLOGY | Age: 85
Discharge: HOME | End: 2020-10-01
Payer: MEDICARE

## 2020-10-01 PROCEDURE — 92557 COMPREHENSIVE HEARING TEST: CPT | Performed by: AUDIOLOGIST

## 2020-10-01 PROCEDURE — 92567 TYMPANOMETRY: CPT | Performed by: AUDIOLOGIST

## 2020-10-01 NOTE — PROGRESS NOTES
AUDIOLOGICAL EVALUATION      REASON FOR TESTING:  The patient is experiencing hearing difficulties and tinnitus. He has difficulties hearing family members. He did have previous occupational noise exposure with some hearing protection use. OTOSCOPY: Clear canal in each ear. 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 RECOMMENDATION(S): Bilateral hearing aids with annual audiometric monitoring is recommended. The patient would like to pursue hearing aids and has scheduled a hearing aid consultation appointment.

## 2020-10-27 ENCOUNTER — HOSPITAL ENCOUNTER (OUTPATIENT)
Dept: AUDIOLOGY | Age: 85
Discharge: HOME | End: 2020-10-27

## 2020-10-27 PROCEDURE — 9990000010 HC NO CHARGE VISIT: Performed by: AUDIOLOGIST

## 2020-10-27 NOTE — PROGRESS NOTES
New Hearing Aid Consultation:  Available hearing aid styles, technologies and options were discussed. The patient is interested in pursuing [employer] P50-R TIFFANIE hearing aids with #0 receivers and acrylic slim tips in color P5. The patient's hearing aid fitting is scheduled for 11/23/2020.

## 2020-11-23 ENCOUNTER — HOSPITAL ENCOUNTER (OUTPATIENT)
Dept: AUDIOLOGY | Age: 85
Discharge: HOME | End: 2020-11-23

## 2020-11-23 PROCEDURE — V5261 HEARING AID, DIGIT, BIN, BTE: HCPCS | Performed by: AUDIOLOGIST

## 2020-11-23 PROCEDURE — V5160 DISPENSING FEE BINAURAL: HCPCS | Performed by: AUDIOLOGIST

## 2020-11-23 NOTE — PROGRESS NOTES
[facility]#: [id number]   ACCT#: [de-identified]    DIAGNOSIS: H90.3    NEW HEARING AID FITTING: Dispensed [employer] P50-R hearing aid(s) with slim tips for the right and left ear(s). Explained care, use and insertion/removal.  Programmed. Hearing aid fitting  recheck scheduled for 12/15/2020. Speech mapping to be completed at follow up.

## 2020-12-03 ENCOUNTER — HOSPITAL ENCOUNTER (OUTPATIENT)
Dept: AUDIOLOGY | Age: 85
Discharge: HOME | End: 2020-12-03

## 2020-12-03 PROCEDURE — 9990000010 HC NO CHARGE VISIT: Performed by: AUDIOLOGIST

## 2020-12-15 ENCOUNTER — HOSPITAL ENCOUNTER (OUTPATIENT)
Dept: AUDIOLOGY | Age: 85
Discharge: HOME | End: 2020-12-15

## 2020-12-15 PROCEDURE — 9990000010 HC NO CHARGE VISIT: Performed by: AUDIOLOGIST

## 2020-12-30 ENCOUNTER — HOSPITAL ENCOUNTER (OUTPATIENT)
Dept: AUDIOLOGY | Age: 85
Discharge: HOME | End: 2020-12-30

## 2020-12-30 PROCEDURE — 9990000010 HC NO CHARGE VISIT: Performed by: AUDIOLOGIST

## 2020-12-30 NOTE — PROGRESS NOTES
Hearing Aid Check:  The patient seems more pleased with his hearing aids. He still feels that his voice is loud. He has increased his wearing time to 9-10 hours a day per data logging. I completed in situ audiometry and reset the hearing aids accordingly. With the changes the patient can now tolerate 85% user gain and I was able to further increase the high frequencies without issue. Mid gain could not be increased without voice complaints. The patient and his wife are both noting benefit when he wears the hearing aids. He has follow scheduled 01/15/2020 at which time he will need to have decided if he is keeping the hearing aids.

## 2021-01-14 ENCOUNTER — OFFICE VISIT (OUTPATIENT)
Dept: CARDIOLOGY CLINIC | Age: 86
End: 2021-01-14
Payer: MEDICARE

## 2021-01-14 VITALS
WEIGHT: 173.6 LBS | DIASTOLIC BLOOD PRESSURE: 62 MMHG | HEIGHT: 70 IN | SYSTOLIC BLOOD PRESSURE: 132 MMHG | HEART RATE: 63 BPM | BODY MASS INDEX: 24.85 KG/M2

## 2021-01-14 DIAGNOSIS — E78.01 FAMILIAL HYPERCHOLESTEROLEMIA: ICD-10-CM

## 2021-01-14 DIAGNOSIS — I71.21 ASCENDING AORTIC ANEURYSM: ICD-10-CM

## 2021-01-14 DIAGNOSIS — I48.0 PAROXYSMAL ATRIAL FIBRILLATION (HCC): ICD-10-CM

## 2021-01-14 DIAGNOSIS — I25.10 CORONARY ARTERY DISEASE INVOLVING NATIVE CORONARY ARTERY OF NATIVE HEART WITHOUT ANGINA PECTORIS: Primary | ICD-10-CM

## 2021-01-14 PROCEDURE — G8484 FLU IMMUNIZE NO ADMIN: HCPCS | Performed by: NUCLEAR MEDICINE

## 2021-01-14 PROCEDURE — 99213 OFFICE O/P EST LOW 20 MIN: CPT | Performed by: NUCLEAR MEDICINE

## 2021-01-14 PROCEDURE — 1123F ACP DISCUSS/DSCN MKR DOCD: CPT | Performed by: NUCLEAR MEDICINE

## 2021-01-14 PROCEDURE — 1036F TOBACCO NON-USER: CPT | Performed by: NUCLEAR MEDICINE

## 2021-01-14 PROCEDURE — 93000 ELECTROCARDIOGRAM COMPLETE: CPT | Performed by: NUCLEAR MEDICINE

## 2021-01-14 PROCEDURE — G8427 DOCREV CUR MEDS BY ELIG CLIN: HCPCS | Performed by: NUCLEAR MEDICINE

## 2021-01-14 PROCEDURE — 4040F PNEUMOC VAC/ADMIN/RCVD: CPT | Performed by: NUCLEAR MEDICINE

## 2021-01-14 PROCEDURE — G8420 CALC BMI NORM PARAMETERS: HCPCS | Performed by: NUCLEAR MEDICINE

## 2021-01-14 RX ORDER — VALSARTAN 160 MG/1
160 TABLET ORAL DAILY
Qty: 90 TABLET | Refills: 3 | Status: SHIPPED | OUTPATIENT
Start: 2021-01-14 | End: 2022-01-25 | Stop reason: SDUPTHER

## 2021-01-14 RX ORDER — FLECAINIDE ACETATE 100 MG/1
TABLET ORAL
Qty: 180 TABLET | Refills: 3 | Status: SHIPPED | OUTPATIENT
Start: 2021-01-14 | End: 2022-02-10 | Stop reason: SDUPTHER

## 2021-01-14 NOTE — PROGRESS NOTES
[address] [address]  [facility] [address]  Dept: [phone]  Dept Fax: [phone]  Loc: [phone]    Visit Date: 1/14/2021    Adolfo Lora is a 80 y.o. male who presents todayfor:  Chief Complaint   Patient presents with    Check-Up     EKG done today.     Coronary Artery Disease    Atrial Fibrillation     A fib is stable   BP is stable  Known moderate CAD  No chest pain   No changes in breathing  Active for age  No bleeding   Ascending aneurysm followed   Last 4.3 cm     HPI:  HPI  Past Medical History:   Diagnosis Date    [facility]fib ([facility] [address]     Bronchitis     CAD (coronary artery disease)     atrial fib     Hypertension     Stroke (cerebrum) ([facility] [address]     Thyroid disease     Trigeminal neuralgia       Past Surgical History:   Procedure Laterality Date    APPENDECTOMY  1951   [facility] BRAIN SURGERY  2003    CHOLECYSTECTOMY      COLONOSCOPY  2013    CRANIOTOMY  2003    GALLBLADDER SURGERY  2010    KIDNEY STONE SURGERY  2003    ID LAP,INGUINAL HERNIA REPR,INITIAL Bilateral 8/16/2018    robotic bilateral inguinal hernia repair with mesh performed by Timothy Kowalski MD at [address] [place] [address]  2003     Family History   Problem Relation Age of Onset    Heart Disease Mother     Heart Attack Mother     Diabetes Father     Heart Disease Father     High Blood Pressure Father     Cancer Brother     Diabetes Brother     Heart Disease Brother     Diabetes Brother     Stroke Brother      Social History     Tobacco Use    Smoking status: Never Smoker    Smokeless tobacco: Never Used   Substance Use Topics    Alcohol use: No      Current Outpatient Medications   Medication Sig Dispense Refill    amLODIPine (NORVASC) 5 MG tablet Take 1 tablet by mouth daily 30 tablet 6    levothyroxine (SYNTHROID) 25 MCG tablet Take 0.5 tablets by mouth Daily 45 tablet 3    atorvastatin (LIPITOR) 10 MG tablet TAKE ONE TABLET BY MOUTH EVERY DAY 90 tablet 3    XARELTO 20 MG TABS tablet TAKE ONE TABLET BY MOUTH EVERY DAY WITH BREAKFAST 90 tablet 1    valsartan (DIOVAN) 160 MG tablet Take 1 tablet by mouth daily 90 tablet 3    flecainide (TAMBOCOR) 100 MG tablet TAKE ONE TABLET BY MOUTH TWICE A  tablet 3    MULTIPLE VITAMIN PO Take 1 tablet by mouth daily        No current facility-administered medications for this visit. Allergies   Allergen Reactions    Prednisone      Raised BP     Health Maintenance   Topic Date Due    DTaP/Tdap/Td vaccine (1 - Tdap) 02/05/1953    TSH testing  09/17/2020    Potassium monitoring  02/22/2021    Creatinine monitoring  02/22/2021    Lipid screen  08/28/2021    Annual Wellness Visit (AWV)  08/28/2021    Flu vaccine  Completed    Shingles Vaccine  Completed    Pneumococcal 65+ years Vaccine  Completed    Hepatitis A vaccine  Aged Out    Hepatitis B vaccine  Aged Out    Hib vaccine  Aged Out    Meningococcal (ACWY) vaccine  Aged Out       Subjective:  Review of Systems  General:   No fever, no chills, some fatigue or weight loss  Pulmonary:    some dyspnea, no wheezing  Cardiac:    Denies recent chest pain,   GI:     No nausea or vomiting, no abdominal pain  Neuro:    No dizziness or light headedness,   Musculoskeletal:  No recent active issues  Extremities:   No edema, no obvious claudication       Objective:  Physical Exam  /62   Pulse 63   Ht 5' 10\" (1.778 m)   Wt 173 lb 9.6 oz (78.7 kg)   BMI 24.91 kg/m²   General:   Well developed, well nourished  Lungs:   Clear to auscultation  Heart:    Normal S1 S2, Slight murmur. no rubs, no gallops  Abdomen:   Soft, non tender, no organomegalies, positive bowel sounds  Extremities:   No edema, no cyanosis, good peripheral pulses  Neurological:   Awake, alert, oriented. No obvious focal deficits  Musculoskelatal:  No obvious deformities    Assessment:      Diagnosis Orders   1.  Coronary artery disease involving native coronary artery of native heart without angina pectoris  EKG 12 lead   2. Paroxysmal atrial fibrillation (HCC)     3. Familial hypercholesterolemia     as above  Cardiac fair for now   ECG in office was done today. I reviewed the ECG. No acute findings    Plan:  No follow-ups on file. As above  Continue risk factor modification and medical management  Thank you for allowing me to participate in the care of your patient. Please don't hesitate to contact me regarding any further issues related to the patient care    Orders Placed:  Orders Placed This Encounter   Procedures    EKG 12 lead     Order Specific Question:   Reason for Exam?     Answer: Other       Medications Prescribed:  No orders of the defined types were placed in this encounter. Discussed use, benefit, and side effects of prescribed medications. All patient questions answered. Pt voicedunderstanding. Instructed to continue current medications, diet and exercise. Continue risk factor modification and medical management. Patient agreed with treatment plan. Follow up as directed.     Electronically signedby Bradley Carmona MD on 1/14/2021 at 11:26 AM

## 2021-01-15 ENCOUNTER — HOSPITAL ENCOUNTER (OUTPATIENT)
Dept: AUDIOLOGY | Age: 86
Discharge: HOME | End: 2021-01-15

## 2021-01-15 PROCEDURE — 9990000010 HC NO CHARGE VISIT: Performed by: AUDIOLOGIST

## 2021-01-15 NOTE — PROGRESS NOTES
The patient is pleased with his hearing aids. He did request the gain be increased. It was increased 5% to 85%. He will follow up as needed. He was encouraged to schedule an annual hearing aid check.

## 2021-01-18 ENCOUNTER — HOSPITAL ENCOUNTER (OUTPATIENT)
Dept: CT IMAGING | Age: 86
Discharge: HOME | End: 2021-01-18
Payer: MEDICARE

## 2021-01-18 ENCOUNTER — HOSPITAL ENCOUNTER (OUTPATIENT)
Age: 86
Discharge: HOME | End: 2021-01-18
Payer: MEDICARE

## 2021-01-18 DIAGNOSIS — I48.0 PAROXYSMAL ATRIAL FIBRILLATION (HCC): ICD-10-CM

## 2021-01-18 DIAGNOSIS — I25.10 CORONARY ARTERY DISEASE INVOLVING NATIVE CORONARY ARTERY OF NATIVE HEART WITHOUT ANGINA PECTORIS: ICD-10-CM

## 2021-01-18 DIAGNOSIS — E78.01 FAMILIAL HYPERCHOLESTEROLEMIA: ICD-10-CM

## 2021-01-18 DIAGNOSIS — I71.21 ASCENDING AORTIC ANEURYSM: ICD-10-CM

## 2021-01-18 LAB
CREATININE, WHOLE BLOOD: 0.9 MG/DL (ref 0.5–1.2)
ESTIMATED GFR, PCACC: 85 ML/MIN/1.73M2

## 2021-01-18 PROCEDURE — 6360000004 HC RX CONTRAST MEDICATION: Performed by: NUCLEAR MEDICINE

## 2021-01-18 PROCEDURE — 71275 CT ANGIOGRAPHY CHEST: CPT

## 2021-01-18 PROCEDURE — 82565 ASSAY OF CREATININE: CPT

## 2021-01-18 RX ADMIN — IOPAMIDOL 100 ML: 755 INJECTION, SOLUTION INTRAVENOUS at 12:46

## 2021-01-21 ENCOUNTER — IMMUNIZATION (OUTPATIENT)
Dept: PRIMARY CARE CLINIC | Age: 86
End: 2021-01-21
Payer: MEDICARE

## 2021-01-21 PROCEDURE — 91301 COVID-19, MODERNA VACCINE 100MCG/0.5ML DOSE: CPT | Performed by: FAMILY MEDICINE

## 2021-01-21 PROCEDURE — 0011A COVID-19, MODERNA VACCINE 100MCG/0.5ML DOSE: CPT | Performed by: FAMILY MEDICINE

## 2021-02-18 ENCOUNTER — IMMUNIZATION (OUTPATIENT)
Dept: PRIMARY CARE CLINIC | Age: 86
End: 2021-02-18
Payer: MEDICARE

## 2021-02-18 PROCEDURE — 0012A COVID-19, MODERNA VACCINE 100MCG/0.5ML DOSE: CPT | Performed by: FAMILY MEDICINE

## 2021-02-18 PROCEDURE — 91301 COVID-19, MODERNA VACCINE 100MCG/0.5ML DOSE: CPT | Performed by: FAMILY MEDICINE

## 2021-05-10 LAB
CHOLESTEROL, TOTAL: 139 MG/DL
CHOLESTEROL/HDL RATIO: 1.6
HDLC SERPL-MCNC: 47 MG/DL (ref 35–70)
LDL CHOLESTEROL CALCULATED: 74 MG/DL (ref 0–160)
NONHDLC SERPL-MCNC: NORMAL MG/DL
TRIGL SERPL-MCNC: 88 MG/DL
VLDLC SERPL CALC-MCNC: 18 MG/DL

## 2021-06-01 RX ORDER — AMLODIPINE BESYLATE 5 MG/1
5 TABLET ORAL DAILY
Qty: 30 TABLET | Refills: 7 | Status: SHIPPED | OUTPATIENT
Start: 2021-06-01 | End: 2022-01-25 | Stop reason: SDUPTHER

## 2021-06-01 NOTE — TELEPHONE ENCOUNTER
Pérez Pacheco called requesting a refill on the following medications:  Requested Prescriptions     Pending Prescriptions Disp Refills    amLODIPine (NORVASC) 5 MG tablet 30 tablet 6     Sig: Take 1 tablet by mouth daily     Pharmacy verified[facility]      Date of last visit:   Date of next visit (if applicable): Visit date not found

## 2021-06-29 ENCOUNTER — HOSPITAL ENCOUNTER (OUTPATIENT)
Dept: AUDIOLOGY | Age: 86
Discharge: HOME | End: 2021-06-29

## 2021-06-29 PROCEDURE — 9990000010 HC NO CHARGE VISIT: Performed by: AUDIOLOGIST

## 2021-06-29 NOTE — PROGRESS NOTES
ACCOUNT #: [de-identified]    DIAGNOSIS: H90.3    HEARING AID PROBLEM: The patient was unable to change his wax filters. I removed the old filters and replaced the damaged left . The patient was able to remove and insert the filters after service. He will follow up as needed. N/C under warranty.

## 2021-07-20 DIAGNOSIS — E03.8 OTHER SPECIFIED HYPOTHYROIDISM: ICD-10-CM

## 2021-07-20 RX ORDER — ATORVASTATIN CALCIUM 10 MG/1
TABLET, FILM COATED ORAL
Qty: 90 TABLET | Refills: 1 | Status: SHIPPED | OUTPATIENT
Start: 2021-07-20 | End: 2022-01-25 | Stop reason: SDUPTHER

## 2021-07-20 RX ORDER — LEVOTHYROXINE SODIUM 0.03 MG/1
12.5 TABLET ORAL DAILY
Qty: 45 TABLET | Refills: 3 | Status: SHIPPED | OUTPATIENT
Start: 2021-07-20 | End: 2022-07-14 | Stop reason: SDUPTHER

## 2021-07-20 NOTE — TELEPHONE ENCOUNTER
Garret Melendez called requesting a refill on the following medications:  Requested Prescriptions     Pending Prescriptions Disp Refills    atorvastatin (LIPITOR) 10 MG tablet 90 tablet 3     Sig: TAKE ONE TABLET BY MOUTH EVERY DAY    rivaroxaban (XARELTO) 20 MG TABS tablet 30 tablet 3     Sig: Take 1 tablet by mouth daily (with breakfast)     [facility]    CAN THE XARELTO ALSO BE SENT OVER FOR 90 DAY SUPPLIES?     Date of last visit: 1/14/2021  Date of next visit (if applicable): 2/63/4869

## 2021-07-20 NOTE — TELEPHONE ENCOUNTER
Sent in thyroid med script, but he is due for thyroid labs which were ordered. Also, He hasnt been seen since aug 2020.   Please schedule for AWV this august. thanks

## 2021-07-23 ENCOUNTER — HOSPITAL ENCOUNTER (OUTPATIENT)
Age: 86
Discharge: HOME | End: 2021-07-23
Payer: MEDICARE

## 2021-07-23 DIAGNOSIS — E03.8 OTHER SPECIFIED HYPOTHYROIDISM: ICD-10-CM

## 2021-07-23 LAB — TSH SERPL DL<=0.05 MIU/L-ACNC: 4.03 UIU/ML (ref 0.4–4.2)

## 2021-07-23 PROCEDURE — 36415 COLL VENOUS BLD VENIPUNCTURE: CPT

## 2021-07-23 PROCEDURE — 84443 ASSAY THYROID STIM HORMONE: CPT

## 2021-08-31 ENCOUNTER — OFFICE VISIT (OUTPATIENT)
Dept: FAMILY MEDICINE CLINIC | Age: 86
End: 2021-08-31
Payer: MEDICARE

## 2021-08-31 VITALS
SYSTOLIC BLOOD PRESSURE: 120 MMHG | BODY MASS INDEX: 24.14 KG/M2 | OXYGEN SATURATION: 97 % | WEIGHT: 168.6 LBS | HEART RATE: 64 BPM | DIASTOLIC BLOOD PRESSURE: 72 MMHG | TEMPERATURE: 98.4 F | RESPIRATION RATE: 16 BRPM | HEIGHT: 70 IN

## 2021-08-31 DIAGNOSIS — I25.118 CORONARY ARTERY DISEASE OF NATIVE ARTERY OF NATIVE HEART WITH STABLE ANGINA PECTORIS (HCC): ICD-10-CM

## 2021-08-31 DIAGNOSIS — Z00.00 ROUTINE GENERAL MEDICAL EXAMINATION AT A HEALTH CARE FACILITY: Primary | ICD-10-CM

## 2021-08-31 DIAGNOSIS — H61.23 BILATERAL IMPACTED CERUMEN: ICD-10-CM

## 2021-08-31 DIAGNOSIS — D68.9 COAGULOPATHY (HCC): ICD-10-CM

## 2021-08-31 PROCEDURE — 1123F ACP DISCUSS/DSCN MKR DOCD: CPT | Performed by: FAMILY MEDICINE

## 2021-08-31 PROCEDURE — 4040F PNEUMOC VAC/ADMIN/RCVD: CPT | Performed by: FAMILY MEDICINE

## 2021-08-31 PROCEDURE — 69210 REMOVE IMPACTED EAR WAX UNI: CPT | Performed by: FAMILY MEDICINE

## 2021-08-31 PROCEDURE — G0439 PPPS, SUBSEQ VISIT: HCPCS | Performed by: FAMILY MEDICINE

## 2021-08-31 SDOH — ECONOMIC STABILITY: FOOD INSECURITY: WITHIN THE PAST 12 MONTHS, YOU WORRIED THAT YOUR FOOD WOULD RUN OUT BEFORE YOU GOT MONEY TO BUY MORE.: NEVER TRUE

## 2021-08-31 SDOH — ECONOMIC STABILITY: TRANSPORTATION INSECURITY
IN THE PAST 12 MONTHS, HAS THE LACK OF TRANSPORTATION KEPT YOU FROM MEDICAL APPOINTMENTS OR FROM GETTING MEDICATIONS?: NO

## 2021-08-31 SDOH — ECONOMIC STABILITY: FOOD INSECURITY: WITHIN THE PAST 12 MONTHS, THE FOOD YOU BOUGHT JUST DIDN'T LAST AND YOU DIDN'T HAVE MONEY TO GET MORE.: NEVER TRUE

## 2021-08-31 SDOH — ECONOMIC STABILITY: TRANSPORTATION INSECURITY
IN THE PAST 12 MONTHS, HAS LACK OF TRANSPORTATION KEPT YOU FROM MEETINGS, WORK, OR FROM GETTING THINGS NEEDED FOR DAILY LIVING?: NO

## 2021-08-31 ASSESSMENT — PATIENT HEALTH QUESTIONNAIRE - PHQ9
2. FEELING DOWN, DEPRESSED OR HOPELESS: 1
SUM OF ALL RESPONSES TO PHQ QUESTIONS 1-9: 1
1. LITTLE INTEREST OR PLEASURE IN DOING THINGS: 0
SUM OF ALL RESPONSES TO PHQ QUESTIONS 1-9: 1
SUM OF ALL RESPONSES TO PHQ9 QUESTIONS 1 & 2: 1
SUM OF ALL RESPONSES TO PHQ QUESTIONS 1-9: 1

## 2021-08-31 ASSESSMENT — SOCIAL DETERMINANTS OF HEALTH (SDOH): HOW HARD IS IT FOR YOU TO PAY FOR THE VERY BASICS LIKE FOOD, HOUSING, MEDICAL CARE, AND HEATING?: NOT HARD AT ALL

## 2021-08-31 NOTE — PATIENT INSTRUCTIONS
Learning About Ramos Brenner  What is a living will? A living will, also called a declaration, is a legal form. It tells your family and your doctor your wishes when you can't speak for yourself. It's used by the health professionals who will treat you as you near the end of your life or if you get seriously hurt or ill. If you put your wishes in writing, your loved ones and others will know what kind of care you want. They won't need to guess. This can ease your mind and be helpful to others. And you can change or cancel your living will at any time. A living will is not the same as an estate or property will. An estate will explains what you want to happen with your money and property after you die. How do you use it? A living will is used to describe the kinds of treatment or life support you want as you near the end of your life or if you get seriously hurt or ill. Keep these facts in mind about living godoy. · Your living will is used only if you can't speak or make decisions for yourself. Most often, one or more doctors must certify that you can't speak or decide for yourself before your living will takes effect. · If you get better and can speak for yourself again, you can accept or refuse any treatment. It doesn't matter what you said in your living will. · Some states may limit your right to refuse treatment in certain cases. For example, you may need to clearly state in your living will that you don't want artificial hydration and nutrition, such as being fed through a tube. Is a living will a legal document? A living will is a legal document. Each state has its own laws about living godoy. And a living will may be called something else in your state. Here are some things to know about living godoy. · You don't need an  to complete a living will. But legal advice can be helpful if your state's laws are unclear.  It can also help if your health history is complicated or your family can't agree on what should be in your living will. · You can change your living will at any time. Some people find that their wishes about end-of-life care change as their health changes. If you make big changes to your living will, complete a new form. · If you move to another state, make sure that your living will is legal in the state where you now live. In most cases, doctors will respect your wishes even if you have a form from a different state. · You might use a universal form that has been approved by many states. This kind of form can sometimes be filled out and stored online. Your digital copy will then be available wherever you have a connection to the internet. The doctors and nurses who need to treat you can find it right away. · Your state may offer an online registry. This is another place where you can store your living will online. · It's a good idea to get your living will notarized. This means using a person called a  to watch two people sign, or witness, your living will. What should you know when you create a living will? Here are some questions to ask yourself as you make your living will:  · Do you know enough about life support methods that might be used? If not, talk to your doctor so you know what might be done if you can't breathe on your own, your heart stops, or you can't swallow. · What things would you still want to be able to do after you receive life-support methods? Would you want to be able to walk? To speak? To eat on your own? To live without the help of machines? · Do you want certain Bahai practices performed if you become very ill? · If you have a choice, where do you want to be cared for? In your home? At a hospital or nursing home? · If you have a choice at the end of your life, where would you prefer to die? At home? In a hospital or nursing home? Somewhere else? · Would you prefer to be buried or cremated?   · Do you want your organs to be donated after you die? What should you do with your living will? · Make sure that your family members and your health care agent have copies of your living will (also called a declaration). · Give your doctor a copy of your living will. Ask him or her to keep it as part of your medical record. If you have more than one doctor, make sure that each one has a copy. · Put a copy of your living will where it can be easily found. For example, some people may put a copy on their refrigerator door. If you are using a digital copy, be sure your doctor, family members, and health care agent know how to find and access it. Where can you learn more? Go to https://PellePharmpeImcompanyeweb.[email or web address] org and sign in to your [employer] account. Enter Y453 in the [employer] box to learn more about \"Learning About Living Ahsan. \"     If you do not have an account, please click on the \"Sign Up Now\" link. Current as of: March 17, 2021               Content Version: 12.9  © 6348-4116 Healthwise, [employer]. Care instructions adapted under license by [facility] ([facility]). If you have questions about a medical condition or this instruction, always ask your healthcare professional. [place] [address] any warranty or liability for your use of this information. Personalized Preventive Plan for Alfred Lisa - 8/31/2021  Medicare offers a range of preventive health benefits. Some of the tests and screenings are paid in full while other may be subject to a deductible, co-insurance, and/or copay. Some of these benefits include a comprehensive review of your medical history including lifestyle, illnesses that may run in your family, and various assessments and screenings as appropriate. After reviewing your medical record and screening and assessments performed today your provider may have ordered immunizations, labs, imaging, and/or referrals for you.   A list of these orders (if applicable) as well as your Preventive Care list are included within your After Visit Summary for your review. Other Preventive Recommendations:    · A preventive eye exam performed by an eye specialist is recommended every 1-2 years to screen for glaucoma; cataracts, macular degeneration, and other eye disorders. · A preventive dental visit is recommended every 6 months. · Try to get at least 150 minutes of exercise per week or 10,000 steps per day on a pedometer . · Order or download the FREE \"Exercise & Physical Activity: Your Everyday Guide\" from [employer]. Call 7-384.714.5163 or search [employer] online. · You need 7703-5906 mg of calcium and 2817-6398 IU of vitamin D per day. It is possible to meet your calcium requirement with diet alone, but a vitamin D supplement is usually necessary to meet this goal.  · When exposed to the sun, use a sunscreen that protects against both UVA and UVB radiation with an SPF of 30 or greater. Reapply every 2 to 3 hours or after sweating, drying off with a towel, or swimming. · Always wear a seat belt when traveling in a car. Always wear a helmet when riding a bicycle or motorcycle. Patient Education        Well Visit, Over 72: Care Instructions  Overview     Well visits can help you stay healthy. Your doctor has checked your overall health and may have suggested ways to take good care of yourself. Your doctor also may have recommended tests. At home, you can help prevent illness with healthy eating, regular exercise, and other steps. Follow-up care is a key part of your treatment and safety. Be sure to make and go to all appointments, and call your doctor if you are having problems. It's also a good idea to know your test results and keep a list of the medicines you take. How can you care for yourself at home? Get screening tests that you and your doctor decide on. Screening helps find diseases before any symptoms appear. Eat healthy foods. Choose fruits, vegetables, whole grains, protein, and low-fat dairy foods. Limit fat, especially saturated fat. Reduce salt in your diet. Limit alcohol. If you are a man, have no more than 2 drinks a day or 14 drinks a week. If you are a woman, have no more than 1 drink a day or 7 drinks a week. Since alcohol affects older adults differently, you may want to limit alcohol even more. Or you may not want to drink at all. Get at least 30 minutes of exercise on most days of the week. Walking is a good choice. You also may want to do other activities, such as running, swimming, cycling, or playing tennis or team sports. Reach and stay at a healthy weight. This will lower your risk for many problems, such as obesity, diabetes, heart disease, and high blood pressure. Do not smoke. Smoking can make health problems worse. If you need help quitting, talk to your doctor about stop-smoking programs and medicines. These can increase your chances of quitting for good. Care for your mental health. It is easy to get weighed down by worry and stress. Learn strategies to manage stress, like deep breathing and mindfulness, and stay connected with your family and community. If you find you often feel sad or hopeless, talk with your doctor. Treatment can help. Talk to your doctor about whether you have any risk factors for sexually transmitted infections (STIs). You can help prevent STIs if you wait to have sex with a new partner (or partners) until you've each been tested for STIs. It also helps if you use condoms (male or female condoms) and if you limit your sex partners to one person who only has sex with you. Vaccines are available for some STIs. If you think you may have a problem with alcohol or drug use, talk to your doctor. This includes prescription medicines (such as amphetamines and opioids) and illegal drugs (such as cocaine and methamphetamine).  Your doctor can help you figure out what type of treatment is best for you.  Protect your skin from too much sun. When you're outdoors from 10 a.m. to 4 p.m., stay in the shade or cover up with clothing and a hat with a wide brim. Wear sunglasses that block UV rays. Even when it's cloudy, put broad-spectrum sunscreen (SPF 30 or higher) on any exposed skin. See a dentist one or two times a year for checkups and to have your teeth cleaned. Wear a seat belt in the car. When should you call for help? Watch closely for changes in your health, and be sure to contact your doctor if you have any problems or symptoms that concern you. Where can you learn more? Go to https://StarteedpeZenputeweb.[email or web address] org and sign in to your [employer] account. Enter F722 in the [facility] box to learn more about \"Well Visit, Over 65: Care Instructions. \"     If you do not have an account, please click on the \"Sign Up Now\" link. Current as of: May 27, 2020               Content Version: 12.9  © 6781-5256 Healthwise, Incorporated. Care instructions adapted under license by [facility] ([facility]). If you have questions about a medical condition or this instruction, always ask your healthcare professional. [address] any warranty or liability for your use of this information.

## 2021-08-31 NOTE — PROGRESS NOTES
Medicare Annual Wellness Visit  Name: Shalini Reyes Date: 2021   MRN: [id number] Sex: Male   Age: 80 y.o. Ethnicity: Non- / Non    : 1934 Race: White (non-)      Mayra Serna is here for Medicare AWV    Screenings for behavioral, psychosocial and functional/safety risks, and cognitive dysfunction are all negative except as indicated below. These results, as well as other patient data from the [address] form, are documented in Flowsheets linked to this Encounter. Allergies   Allergen Reactions    Prednisone      Raised BP         Prior to Visit Medications    Medication Sig Taking?  Authorizing Provider   atorvastatin (LIPITOR) 10 MG tablet TAKE ONE TABLET BY MOUTH EVERY DAY Yes Marina Puente MD   levothyroxine (SYNTHROID) 25 MCG tablet Take 0.5 tablets by mouth Daily Yes Sylvester Bangura MD   amLODIPine (NORVASC) 5 MG tablet Take 1 tablet by mouth daily Yes Marina Puente MD   flecainide (TAMBOCOR) 100 MG tablet TAKE ONE TABLET BY MOUTH TWICE A DAY Yes Marina Puente MD   valsartan (DIOVAN) 160 MG tablet Take 1 tablet by mouth daily Yes Marina Puente MD   rivaroxaban (XARELTO) 20 MG TABS tablet TAKE ONE TABLET BY MOUTH EVERY DAY WITH BREAKFAST Yes Brad Rosado MD   MULTIPLE VITAMIN PO Take 1 tablet by mouth daily  Yes Historical Provider, MD         Past Medical History:   Diagnosis Date    A-fib ([address]     Bronchitis     CAD (coronary artery disease)     atrial fib     Hypertension     Stroke (cerebrum) ([address]     Thyroid disease     Trigeminal neuralgia        Past Surgical History:   Procedure Laterality Date    APPENDECTOMY     [facility] BRAIN SURGERY      CHOLECYSTECTOMY      COLONOSCOPY  2013    CRANIOTOMY     [address] [place] [address]  2010    KIDNEY STONE SURGERY  2003    AL [address] Bilateral 2018    robotic bilateral inguinal hernia repair with mesh performed by Julee Mazariegos Javier Forbes MD at [address] [place] [address]  2003         Family History   Problem Relation Age of Onset    Heart Disease Mother     Heart Attack Mother     Diabetes Father     Heart Disease Father     High Blood Pressure Father     Cancer Brother     Diabetes Brother     Heart Disease Brother     Diabetes Brother     Stroke Brother        CareTeam (Including outside providers/suppliers regularly involved in providing care):   Patient Care Team:  Brock Chávez MD as PCP - General (Family Medicine)  Brock Chávez MD as PCP - [facility]    Wt Readings from Last 3 Encounters:   08/31/21 168 lb 9.6 oz (76.5 kg)   01/14/21 173 lb 9.6 oz (78.7 kg)   08/27/20 172 lb (78 kg)     Vitals:    08/31/21 0933   BP: 120/72   Pulse: 64   Resp: 16   Temp: 98.4 °F (36.9 °C)   TempSrc: Temporal   SpO2: 97%   Weight: 168 lb 9.6 oz (76.5 kg)   Height: 5' 10\" (1.778 m)     Body mass index is 24.19 kg/m². Based upon direct observation of the patient, evaluation of cognition reveals recent and remote memory intact. ENT: bilateral cerumen impaction. Decreased hearing  Pulmonary/Chest: clear to auscultation bilaterally- no wheezes, rales or rhonchi, normal air movement, no respiratory distress  Cardiovascular: normal rate, normal S1 and S2, no gallops, intact distal pulses and no carotid bruits    Patient's complete Health Risk Assessment and screening values have been reviewed and are found in Flowsheets. The following problems were reviewed today and where indicated follow up appointments were made and/or referrals ordered.     Positive Risk Factor Screenings with Interventions:           Health Habits/Nutrition:  Health Habits/Nutrition  Do you exercise for at least 20 minutes 2-3 times per week?: Yes  Have you lost any weight without trying in the past 3 months?: No  Do you eat only one meal per day?: (!) Yes  Have you seen the dentist within the past year?: Yes  Body mass index: 24.19  Health Habits/Nutrition Interventions:  · Nutritional issues:  educational materials for healthy, well-balanced diet provided       Personalized Preventive Plan   Current Health Maintenance Status  Immunization History   Administered Date(s) Administered    COVID-19, Moderna, PF, 100mcg/0.5mL 01/21/2021, 02/18/2021    Influenza, High Dose (Fluzone 65 yrs and older) 10/12/2015, 10/07/2017, 09/13/2018    Influenza, Triv, inactivated, subunit, adjuvanted, IM (Fluad 65 yrs and older) 10/16/2019    Pneumococcal Conjugate 13-valent (Zoamrox14) 10/14/2015    Pneumococcal Polysaccharide (Rickxusio18) 09/18/2014    Zoster Live (Zostavax) 01/28/2016    Zoster Recombinant (Shingrix) 06/11/2019, 08/14/2019        Health Maintenance   Topic Date Due    DTaP/Tdap/Td vaccine (1 - Tdap) Never done    Potassium monitoring  02/22/2021    Annual Wellness Visit (AWV)  Never done    Flu vaccine (1) 09/01/2021    Creatinine monitoring  01/18/2022    Lipid screen  05/10/2022    TSH testing  07/23/2022    Shingles Vaccine  Completed    Pneumococcal 65+ years Vaccine  Completed    COVID-19 Vaccine  Completed    Hepatitis A vaccine  Aged Out    Hepatitis B vaccine  Aged Out    Hib vaccine  Aged Out    Meningococcal (ACWY) vaccine  Aged Out       Ear Cerumen Removal Procedure Note    Indication: ear cerumen impaction    Procedure: After placing the patient's head in the appropriate position, the patient's right ear canal was irrigated with the appropriate solution and curetted until the majority of the cerumen was flushed out of the canal.  The other ear canal also had cerumen present and was irrigated with the appropriate solution and curetted until the majority of the cerumen was flushed out of the canal. At this point the procedure was complete. The patient tolerated the procedure well. Complications: None        Recommendations for Preventive Services Due: see orders and patient instructions/AVS.  .   Recommended screening schedule for the next 5-10 years is provided to the patient in written form: see Patient Instructions/AVS.    Ana Laura La was seen today for medicare awv.     Diagnoses and all orders for this visit:    Routine general medical examination at a health care facility    Coagulopathy [facility])  -chronic, stable, cont xarelto  Coronary artery disease of native artery of native heart with stable angina pectoris ([facility] [address]  -chronic, denies symptoms, med management with statin, bp control, flecanide  Bilateral impacted cerumen  -     33673 - NE REMOVE IMPACTED EAR WAX             Electronically signed by Jeanine Puente MD on 8/31/2021 at 9:56 AM

## 2021-09-20 ENCOUNTER — TELEPHONE (OUTPATIENT)
Dept: CARDIOLOGY CLINIC | Age: 86
End: 2021-09-20

## 2021-09-20 NOTE — TELEPHONE ENCOUNTER
Pre op Risk Assessment    Procedure shoulder   Physician Dr Jesusita Abdi   Date of surgery/procedure 10/4    Last OV  1/14/21  Last Stress 11/4/14  Last Echo 7/16/20  Last Cath 915/17  Last Stent none in chart    Is patient on blood thinners xarelto   Hold Meds/how many days ?

## 2021-10-28 ENCOUNTER — OFFICE VISIT (OUTPATIENT)
Dept: CARDIOLOGY CLINIC | Age: 86
End: 2021-10-28
Payer: MEDICARE

## 2021-10-28 VITALS
DIASTOLIC BLOOD PRESSURE: 62 MMHG | BODY MASS INDEX: 24.77 KG/M2 | HEART RATE: 80 BPM | WEIGHT: 173 LBS | HEIGHT: 70 IN | SYSTOLIC BLOOD PRESSURE: 130 MMHG

## 2021-10-28 DIAGNOSIS — I48.0 PAROXYSMAL ATRIAL FIBRILLATION (HCC): Primary | ICD-10-CM

## 2021-10-28 PROCEDURE — 99214 OFFICE O/P EST MOD 30 MIN: CPT | Performed by: INTERNAL MEDICINE

## 2021-10-28 PROCEDURE — 1123F ACP DISCUSS/DSCN MKR DOCD: CPT | Performed by: INTERNAL MEDICINE

## 2021-10-28 PROCEDURE — 4040F PNEUMOC VAC/ADMIN/RCVD: CPT | Performed by: INTERNAL MEDICINE

## 2021-10-28 PROCEDURE — G8484 FLU IMMUNIZE NO ADMIN: HCPCS | Performed by: INTERNAL MEDICINE

## 2021-10-28 PROCEDURE — 1036F TOBACCO NON-USER: CPT | Performed by: INTERNAL MEDICINE

## 2021-10-28 PROCEDURE — G8420 CALC BMI NORM PARAMETERS: HCPCS | Performed by: INTERNAL MEDICINE

## 2021-10-28 PROCEDURE — G8427 DOCREV CUR MEDS BY ELIG CLIN: HCPCS | Performed by: INTERNAL MEDICINE

## 2021-10-28 NOTE — PATIENT INSTRUCTIONS
The patient stated she take 2 tablet daily.      Oxybutynin      Last Written Prescription Date:  2-23-18  Last Fill Quantity: 240,   # refills: 5  Last Office Visit: 12-21-18  Future Office visit:    Next 5 appointments (look out 90 days)    Feb 19, 2019 10:00 AM CST  [facility] with [facility]io [facility]  [facility] [address] [place] [id number] [phone]           Routing refill request to provider for review/approval because:  Medication change in sig.  See above note.     You may receive a survey regarding the care you received during your visit. Your input is valuable to us. We encourage you to complete and return your survey. We hope you will choose us in the future for your healthcare needs.

## 2021-10-28 NOTE — PROGRESS NOTES
[facility] [address] [place] [address]  Dept: [phone]  Dept Fax: [phone]  Loc: [phone]    Visit Date: 10/28/2021    Mr. Nae Reynoso is a 80 y.o. male  who presented for:  Chief Complaint   Patient presents with    New Patient     discuss watchman    Atrial Fibrillation       HPI:   HPI   79 yo M with non-obstructive CAD, paroxysmal AFib on Flecainide and [address] who presents for evaluation of WATCHMAN. Has had tremendous epistaxis that took a very long time to stop. Has had issues with ecchymoses and bleeding with minimal trauma. He has had no CVA. His daughter states he has had issues with hematuria in the past.  No falls. He has an ascending aortic aneurysm 4.3 cm. He also has had spontaneous ear bleeding with blood behind the TM in the past.  He has not had any other GI bleeding or hemoptysis. EF 60%, mild AI. He notes that he had left shoulder injection in the past and developed significant hemarthrosis that required admission and management 2/2020. No chest pain, angina, LEDEZMA, orthopnea, PND, sob at rest, palpitations, LE edema, or syncope. No hx of CVA.          Current Outpatient Medications:     rivaroxaban (XARELTO) 20 MG TABS tablet, Take 1 tablet by mouth daily (with breakfast), Disp: 77 tablet, Rfl: 0    atorvastatin (LIPITOR) 10 MG tablet, TAKE ONE TABLET BY MOUTH EVERY DAY, Disp: 90 tablet, Rfl: 1    levothyroxine (SYNTHROID) 25 MCG tablet, Take 0.5 tablets by mouth Daily, Disp: 45 tablet, Rfl: 3    amLODIPine (NORVASC) 5 MG tablet, Take 1 tablet by mouth daily, Disp: 30 tablet, Rfl: 7    flecainide (TAMBOCOR) 100 MG tablet, TAKE ONE TABLET BY MOUTH TWICE A DAY, Disp: 180 tablet, Rfl: 3    valsartan (DIOVAN) 160 MG tablet, Take 1 tablet by mouth daily, Disp: 90 tablet, Rfl: 3    rivaroxaban (XARELTO) 20 MG TABS tablet, TAKE ONE TABLET BY MOUTH EVERY DAY WITH BREAKFAST, Disp: 28 tablet, Rfl: 0    MULTIPLE VITAMIN PO, Take 1 tablet by mouth daily , Disp: , Rfl:     Past Medical History  Maria M Blackwood  has a past medical history of A-fib ([address] Bronchitis, CAD (coronary artery disease), Hypertension, Stroke (cerebrum) ([address] Thyroid disease, and Trigeminal neuralgia. Social History  Maria M Blackwood  reports that he has never smoked. He has never used smokeless tobacco. He reports that he does not drink alcohol and does not use drugs. Family History  Maria M Blackwood family history includes Cancer in his brother; Diabetes in his brother, brother, and father; Heart Attack in his mother; Heart Disease in his brother, father, and mother; High Blood Pressure in his father; Stroke in his brother. There is no family history of bicuspid aortic valve, aneurysms, heart transplant, pacemakers, defibrillators, or sudden cardiac death. Past Surgical History   Past Surgical History:   Procedure Laterality Date    APPENDECTOMY  1951   [employer] BRAIN SURGERY  2003    CHOLECYSTECTOMY      COLONOSCOPY  2013    CRANIOTOMY  2003   [employer] GALLBLADDER SURGERY  2010    KIDNEY STONE SURGERY  2003    MT LAP,INGUINAL HERNIA REPR,INITIAL Bilateral 8/16/2018    robotic bilateral inguinal hernia repair with mesh performed by Vasquez Lezama MD at [address] [place] [address]  2003       Review of Systems   Constitutional: Negative for chills and fever  HENT: Negative for congestion, sinus pressure, sneezing and sore throat. Eyes: Negative for pain, discharge, redness and itching. Respiratory: Negative for apnea, cough  Gastrointestinal: Negative for blood in stool, constipation, diarrhea   Endocrine: Negative for cold intolerance, heat intolerance, polydipsia. Genitourinary: Negative for dysuria, enuresis, flank pain and hematuria. Musculoskeletal: Negative for arthralgias, joint swelling and neck pain. Neurological: Negative for numbness and headaches.    Psychiatric/Behavioral: Negative for agitation, confusion, decreased concentration and dysphoric mood. Objective:     /62   Pulse 80   Ht 5' 10\" (1.778 m)   Wt 173 lb (78.5 kg)   BMI 24.82 kg/m²     Wt Readings from Last 3 Encounters:   10/28/21 173 lb (78.5 kg)   08/31/21 168 lb 9.6 oz (76.5 kg)   01/14/21 173 lb 9.6 oz (78.7 kg)     BP Readings from Last 3 Encounters:   10/28/21 130/62   08/31/21 120/72   01/14/21 132/62       Nursing note and vitals reviewed. Physical Exam   Constitutional: Oriented to person, place, and time. Appears well-developed and well-nourished. HENT:   Head: Normocephalic and atraumatic. Eyes: EOM are normal. Pupils are equal, round, and reactive to light. Neck: Normal range of motion. Neck supple. No JVD present. Cardiovascular: Normal rate, regular rhythm, normal heart sounds and intact distal pulses. No murmur heard. Pulmonary/Chest: Effort normal and breath sounds normal. No respiratory distress. No wheezes. No rales. Abdominal: Soft. Bowel sounds are normal. No distension. There is no tenderness. Musculoskeletal: Normal range of motion. No edema. Neurological: Alert and oriented to person, place, and time. No cranial nerve deficit. Coordination normal.   Skin: Skin is warm and dry. Psychiatric: Normal mood and affect.        No results found for: CKTOTAL, CKMB, CKMBINDEX    Lab Results   Component Value Date    WBC 14.2 02/22/2020    RBC 4.15 02/22/2020    HGB 13.6 02/22/2020    HCT 41.7 02/22/2020    .6 02/22/2020    MCH 32.7 02/22/2020    MCHC 32.5 02/22/2020    RDW 11.9 02/22/2020     02/22/2020    MPV 7.3 02/22/2020       Lab Results   Component Value Date     02/22/2020    K 4.1 02/22/2020    K 4.6 07/30/2019     02/22/2020    CO2 28 02/22/2020    BUN 17 02/22/2020    LABALBU 3.7 02/22/2020    CREATININE 0.9 01/18/2021    CREATININE 1.0 02/22/2020    CALCIUM 9.2 07/30/2019    LABGLOM >90 07/30/2019    GLUCOSE 207 02/22/2020    GLUCOSE 98 07/26/2018       Lab Results   Component Value Date    ALKPHOS 93 02/22/2020    ALT 23 02/22/2020    AST 19 02/22/2020    PROT 6.8 02/22/2020    BILITOT 0.4 02/22/2020    BILIDIR <0.2 07/29/2019    LABALBU 3.7 02/22/2020       Lab Results   Component Value Date    MG 2.1 07/30/2019       Lab Results   Component Value Date    INR 1.46 (H) 02/22/2020    INR 1.42 (H) 07/29/2019    INR 1.01 08/16/2018         No results found for: LABA1C    Lab Results   Component Value Date    TRIG 88 05/10/2021    HDL 47 05/10/2021    LDLCALC 74 05/10/2021       Lab Results   Component Value Date    TSH 4.030 07/23/2021         Testing Reviewed:      I have individually reviewed the cardiac test below:    ECHO: Results for orders placed during the hospital encounter of 07/16/20    ECHO Complete 2D W Doppler W Color    Narrative  Transthoracic Echocardiography Report (TTE)    Demographics    Patient Name   [facility]        Gender              Male  Umm Pendleton    MR #           [id number]        Race                    Ethnicity    Account #      [de-identified]        Room Number    Accession      [id number]        Date of Study       07/16/2020  Number    Date of Birth  1934       Referring Physician William Aguilera MD    Age            80 year(s)       Sonographer         SEAN TayT    Interpreting        William Marie MD  Physician    Procedure    Type of Study    TTE procedure:ECHOCARDIOGRAM COMPLETE 2D W DOPPLER W COLOR. Procedure Date  Date: 07/16/2020 Start: 10:36 AM    Study Location: Echo Lab  Technical Quality: Adequate visualization    Indications:Coronary artery disease. Additional Medical History:stroke, atrial fibrillation, dizziness, thoracic  aortic aneurysm, hypothyroid, hypertension, hyperlipidemia    Patient Status: Routine    Height: 70 inches Weight: 170 pounds BSA: 1.95 m^2 BMI: 24.39 kg/m^2    BP: 130/64 mmHg    Allergies  - No Known Allergies.     Conclusions    Summary  Ejection fraction is visually estimated at 60%.  Overall left ventricular function is normal.  The aortic valve leaflets were not well visualized. Aortic valve appears tricuspid. Mild aortic regurgitation is noted. Signature    ----------------------------------------------------------------  Electronically signed by Calla Lesch MD (Interpreting  physician) on 07/16/2020 at 03:54 PM  ----------------------------------------------------------------    Findings    Mitral Valve  Trace mitral regurgitation is present. Aortic Valve  The aortic valve leaflets were not well visualized. Aortic valve appears tricuspid. Mild aortic regurgitation is noted. Tricuspid Valve  Mild tricuspid regurgitation. Pulmonic Valve  The pulmonic valve was not well visualized . Trivial pulmonic regurgitation visualized. Left Atrium  Left atrial size was normal.    Left Ventricle  Ejection fraction is visually estimated at 60%. Overall left ventricular function is normal.    Right Atrium  Right atrial size was normal.    Right Ventricle  The right ventricular size was normal with normal systolic function and  wall thickness. Pericardial Effusion  The pericardium was normal in appearance with no evidence of a pericardial  effusion. Pleural Effusion  No evidence of pleural effusion. Aorta / Great Vessels  -Aortic root dimension within normal limits.  -The Pulmonary artery is within normal limits. -IVC size is within normal limits with normal respiratory phasic changes.     M-Mode/2D Measurements & Calculations    LV Diastolic   LV Systolic Dimension:    AV Cusp Separation: 1.8 cmLA  Dimension: 5   3.6 cm                    Dimension: 3.7 cmAO Root  cm             LV Volume Diastolic: 853  Dimension: 3 cmLA Area: 21.8 cm^2  LV FS:28 %     ml  LV PW          LV Volume Systolic: 66.8  Diastolic: 0.9 ml  cm             LV EDV/LV EDV Index: 309  RV Diastolic Dimension: 3.1 cm  Septum         ml/61 m^2LV ESV/LV ESV  Diastolic: 1.1 Index: 02.2 ml/28 m^2 LA/Aorta: 1.23  cm             EF Calculated: 53.9 %  LA volume/Index: 71.2 ml /37m^2    Doppler Measurements & Calculations    MV Peak E-Wave: 61.3 cm/s  AV Peak Velocity: 142  LVOT Peak Velocity: 123  MV Peak A-Wave: 65.1 cm/s  cm/s                   cm/s  MV E/A Ratio: 0.94         AV Peak Gradient: 8.07 LVOT Peak Gradient: 6  MV Peak Gradient: 1.5 mmHg mmHg                   mmHg    MV Deceleration Time: 204                         TV Peak E-Wave: 53.6  msec                                              cm/s  MV P1/2t: 60 msec                                 TV Peak A-Wave: 34.3  MVA by PHT:3.67 cm^2       AV P1/2t: 519 msec     cm/s    MV E' Septal Velocity: 7.3                        TV Peak Gradient: 1.15  cm/s                                              mmHg  MV A' Septal Velocity:     AV DVI (Vmax):0.87     TR Velocity:247 cm/s  11.7 cm/s                                         TR Gradient:24.4 mmHg  MV E' Lateral Velocity:                           PV Peak Velocity: 91.7  8.3 cm/s                                          cm/s  MV A' Lateral Velocity: 11                        PV Peak Gradient: 3.36  cm/s                                              mmHg  E/E' septal: 8.4  E/E' lateral: 7.39  MR Velocity: 384 cm/s                             RI ED Velocity: 81.4  cm/s    [email or web address] [id number]%[id number]%3d       Assessment/Plan   Paroxysmal Atrial Fibrillation, [id number] = 4    We had a long discussion with the patient and myself regarding the risks/benefits of stroke prophylaxis and anticoagulation using ACC guidelines and risk calculators. Shared decision making has been completed with her primary physician (see EMR). We discussed the options including no prophylaxis, aspirin only, DOACs, Warfarin, and mechanical occlusion of SIMON (Watchman).  The patient was able to comprehend their overall risk of

## 2021-11-15 ENCOUNTER — TELEPHONE (OUTPATIENT)
Dept: CARDIOLOGY CLINIC | Age: 86
End: 2021-11-15

## 2021-11-15 DIAGNOSIS — Z95.818 PRESENCE OF WATCHMAN LEFT ATRIAL APPENDAGE CLOSURE DEVICE: Primary | ICD-10-CM

## 2021-11-15 DIAGNOSIS — I48.0 PAROXYSMAL ATRIAL FIBRILLATION (HCC): ICD-10-CM

## 2021-11-15 NOTE — TELEPHONE ENCOUNTER
WATCHMAN shared decision form obtained from Dr. Leo Gil. Orders received from Dr. Celia Freeman to schedule the patient for a WATCHMAN procedure and have the patient hold Xarelto 2 days prior. Hold Xarelto starting on   WATCHMAN: 12/1/2021 at 0700 have the patient arrive at 0500  Discharge caregiver: wife and daughter     Post procedural orders: Structural heart follow up call on POD 1, have the patient restrart Xarelto on the evening of POD 1 post implant, obtain a CBC, BMP and COVID prior to the 45 day ANNALEE (1/15/2021).       CBC/BMP:   ANNALEE:   Follow up date with primary cardiologist: Dr. Odalis Leon, can you please schedule this patients ANNALEE?

## 2021-11-17 ENCOUNTER — TELEPHONE (OUTPATIENT)
Dept: CARDIOLOGY CLINIC | Age: 86
End: 2021-11-17

## 2021-11-30 ENCOUNTER — PREP FOR PROCEDURE (OUTPATIENT)
Dept: CARDIOLOGY CLINIC | Age: 86
End: 2021-11-30

## 2021-11-30 RX ORDER — SODIUM CHLORIDE 9 MG/ML
INJECTION, SOLUTION INTRAVENOUS CONTINUOUS
Status: CANCELLED | OUTPATIENT
Start: 2021-11-30

## 2021-11-30 RX ORDER — SODIUM CHLORIDE 9 MG/ML
25 INJECTION, SOLUTION INTRAVENOUS PRN
Status: CANCELLED | OUTPATIENT
Start: 2021-11-30

## 2021-11-30 RX ORDER — SODIUM CHLORIDE 9 MG/ML
INJECTION, SOLUTION INTRAVENOUS PRN
Status: CANCELLED | OUTPATIENT
Start: 2021-11-30

## 2021-11-30 RX ORDER — SODIUM CHLORIDE 0.9 % (FLUSH) 0.9 %
5-40 SYRINGE (ML) INJECTION PRN
Status: CANCELLED | OUTPATIENT
Start: 2021-11-30

## 2021-11-30 RX ORDER — SODIUM CHLORIDE 0.9 % (FLUSH) 0.9 %
5-40 SYRINGE (ML) INJECTION EVERY 12 HOURS SCHEDULED
Status: CANCELLED | OUTPATIENT
Start: 2021-11-30

## 2021-12-01 PROBLEM — I48.0 PAROXYSMAL ATRIAL FIBRILLATION (HCC): Status: ACTIVE | Noted: 2021-12-01

## 2021-12-02 ENCOUNTER — TELEPHONE (OUTPATIENT)
Dept: CARDIOLOGY CLINIC | Age: 86
End: 2021-12-02

## 2021-12-02 ENCOUNTER — TELEPHONE (OUTPATIENT)
Dept: FAMILY MEDICINE CLINIC | Age: 86
End: 2021-12-02

## 2021-12-02 NOTE — TELEPHONE ENCOUNTER
legs  · Signs of infection: redness, incision hot to the touch, fevers greater than 101 degrees, or colored drainage from your incision  · Hematoma is an accumulation of fluid in the tissues at the groin surgical site. Symptoms may include: a lump near the groin surgical site, clear fluid draining from the site, redness, warmth, or swelling. What to expect post WATCHMAN implant:  · 45 days post implant a ANNALEE and labs will be obtained to evaluate need for further anticoagulation use. · If any issues with bleeding within the 45 day period, please contact Rahat Asif Dr Coordinator: [phone]. Patient was instructed to [facility] with any of the above concerns at  [phone].

## 2022-01-19 ENCOUNTER — HOSPITAL ENCOUNTER (OUTPATIENT)
Age: 87
Setting detail: OUTPATIENT SURGERY
Discharge: HOME | End: 2022-01-19
Attending: NUCLEAR MEDICINE | Admitting: NUCLEAR MEDICINE
Payer: MEDICARE

## 2022-01-19 VITALS
TEMPERATURE: 97.7 F | DIASTOLIC BLOOD PRESSURE: 64 MMHG | HEART RATE: 52 BPM | RESPIRATION RATE: 16 BRPM | HEIGHT: 70 IN | SYSTOLIC BLOOD PRESSURE: 114 MMHG | BODY MASS INDEX: 24.25 KG/M2 | OXYGEN SATURATION: 93 % | WEIGHT: 169.4 LBS

## 2022-01-19 DIAGNOSIS — Z95.818 PRESENCE OF WATCHMAN LEFT ATRIAL APPENDAGE CLOSURE DEVICE: ICD-10-CM

## 2022-01-19 DIAGNOSIS — I48.0 PAROXYSMAL ATRIAL FIBRILLATION (HCC): ICD-10-CM

## 2022-01-19 LAB
LV EF: 65 %
LVEF MODALITY: NORMAL

## 2022-01-19 PROCEDURE — 7100000010 HC PHASE II RECOVERY - FIRST 15 MIN: Performed by: NUCLEAR MEDICINE

## 2022-01-19 PROCEDURE — 99152 MOD SED SAME PHYS/QHP 5/>YRS: CPT | Performed by: NUCLEAR MEDICINE

## 2022-01-19 PROCEDURE — 2580000003 HC RX 258: Performed by: NUCLEAR MEDICINE

## 2022-01-19 PROCEDURE — 93312 ECHO TRANSESOPHAGEAL: CPT

## 2022-01-19 PROCEDURE — 93325 DOPPLER ECHO COLOR FLOW MAPG: CPT

## 2022-01-19 PROCEDURE — 6360000002 HC RX W HCPCS: Performed by: NUCLEAR MEDICINE

## 2022-01-19 PROCEDURE — 6370000000 HC RX 637 (ALT 250 FOR IP)

## 2022-01-19 PROCEDURE — 93320 DOPPLER ECHO COMPLETE: CPT

## 2022-01-19 RX ORDER — SODIUM CHLORIDE 9 MG/ML
25 INJECTION, SOLUTION INTRAVENOUS PRN
Status: DISCONTINUED | OUTPATIENT
Start: 2022-01-19 | End: 2022-01-19 | Stop reason: HOSPADM

## 2022-01-19 RX ORDER — SODIUM CHLORIDE 0.9 % (FLUSH) 0.9 %
5-40 SYRINGE (ML) INJECTION EVERY 12 HOURS SCHEDULED
Status: DISCONTINUED | OUTPATIENT
Start: 2022-01-19 | End: 2022-01-19 | Stop reason: HOSPADM

## 2022-01-19 RX ORDER — SODIUM CHLORIDE 9 MG/ML
INJECTION, SOLUTION INTRAVENOUS CONTINUOUS
Status: DISCONTINUED | OUTPATIENT
Start: 2022-01-19 | End: 2022-01-19 | Stop reason: HOSPADM

## 2022-01-19 RX ORDER — SODIUM CHLORIDE 0.9 % (FLUSH) 0.9 %
5-40 SYRINGE (ML) INJECTION PRN
Status: DISCONTINUED | OUTPATIENT
Start: 2022-01-19 | End: 2022-01-19 | Stop reason: HOSPADM

## 2022-01-19 RX ORDER — FENTANYL CITRATE 50 UG/ML
INJECTION, SOLUTION INTRAMUSCULAR; INTRAVENOUS PRN
Status: DISCONTINUED | OUTPATIENT
Start: 2022-01-19 | End: 2022-01-19 | Stop reason: ALTCHOICE

## 2022-01-19 RX ORDER — MIDAZOLAM HYDROCHLORIDE 1 MG/ML
INJECTION INTRAMUSCULAR; INTRAVENOUS PRN
Status: DISCONTINUED | OUTPATIENT
Start: 2022-01-19 | End: 2022-01-19 | Stop reason: ALTCHOICE

## 2022-01-19 RX ADMIN — SODIUM CHLORIDE: 9 INJECTION, SOLUTION INTRAVENOUS at 13:48

## 2022-01-19 ASSESSMENT — PAIN SCALES - GENERAL
PAINLEVEL_OUTOF10: 0
PAINLEVEL_OUTOF10: 0

## 2022-01-19 ASSESSMENT — PAIN - FUNCTIONAL ASSESSMENT: PAIN_FUNCTIONAL_ASSESSMENT: 0-10

## 2022-01-19 NOTE — H&P
[address] [place] [address]  Sedation/Analgesia History & Physical    Pt Name: Bette Cervantes  Account number: [de-identified]  MRN: [id number]  YOB: 1934  Provider Performing Procedure: Gerald Hill MD MD [facility]  Primary Care Physician: Kinga Mesa MD  Date: 1/19/2022    PRE-PROCEDURE    Code Status: FULL CODE  Brief History/Pre-Procedure Diagnosis:   Watchman      Consent: : I have discussed with the patient risks, benefits, and alternatives (and relevant risks, benefits, and side effects related to alternatives or not receiving care), and likelihood of the success. The patient and/or representative appear to understand and agree to proceed. The discussion encompasses risks, benefits, and side effects related to the alternatives and the risks related to not receiving the proposed care, treatment, and services. MEDICAL HISTORY  []ASHD/ANGINA/MI/CHF   [x]Hypertension  []Diabetes  []Hyperlipidemia  []Smoking  []Family Hx of ASHD  []Additional information:       has a past medical history of A-fib ([facility] [address] Bronchitis, CAD (coronary artery disease), Hypertension, Stroke (cerebrum) ([facility] [address] Thyroid disease, and Trigeminal neuralgia. SURGICAL HISTORY   has a past surgical history that includes Appendectomy (1951); Kidney stone surgery (2003); Gallbladder surgery (2010); TURP (2003); brain surgery (2003); Cholecystectomy; Colonoscopy (2013); pr lap,inguinal hernia repr,initial (Bilateral, 8/16/2018); and craniotomy (2003).   Additional information:       ALLERGIES   Allergies as of 11/16/2021 - Fully Reviewed 10/28/2021   Allergen Reaction Noted    Prednisone  07/25/2018     Additional information:       MEDICATIONS   Aspirin  [x] 81 mg  [] 325 mg  [] None  Antiplatelet drug therapy use last 5 days  []No []Yes  Coumadin Use Last 5 Days []No []Yes  Other anticoagulant use last 5 days  []No []Yes    Current Facility-Administered Medications:     sodium chloride flush 0.9 % injection 5-40 mL, 5-40 mL, IntraVENous, 2 times per day, Fátima Mendez MD    sodium chloride flush 0.9 % injection 5-40 mL, 5-40 mL, IntraVENous, PRN, Brad Styles MD    0.9 % sodium chloride infusion, 25 mL, IntraVENous, PRN, Brad Styles MD    0.9 % sodium chloride infusion, , IntraVENous, Continuous, Fátima Mendez MD  Prior to Admission medications    Medication Sig Start Date End Date Taking?  Authorizing Provider   aspirin 81 MG chewable tablet Take 1 tablet by mouth daily 12/2/21   Cleve Garcia PA-C   rivaroxaban (XARELTO) 20 MG TABS tablet TAKE ONE TABLET BY MOUTH EVERY DAY WITH BREAKFAST 12/1/21   Levoshae Garcia PA-C   rivaroxaban (XARELTO) 20 MG TABS tablet Take 1 tablet by mouth daily (with breakfast) 12/1/21   Cleve Garcia PA-C   atorvastatin (LIPITOR) 10 MG tablet TAKE ONE TABLET BY MOUTH EVERY DAY 7/20/21   Brad Vance MD   levothyroxine (SYNTHROID) 25 MCG tablet Take 0.5 tablets by mouth Daily 7/20/21   Vipin Grady MD   amLODIPine (NORVASC) 5 MG tablet Take 1 tablet by mouth daily 6/1/21   Fátima Mendez MD   flecainide (TAMBOCOR) 100 MG tablet TAKE ONE TABLET BY MOUTH TWICE A DAY 1/14/21   Fátima Mendez MD   valsartan (DIOVAN) 160 MG tablet Take 1 tablet by mouth daily 1/14/21   Fátima Mendez MD   MULTIPLE VITAMIN PO Take 1 tablet by mouth daily     Historical Provider, MD     Additional information:       VITAL SIGNS   Vitals:    01/19/22 1328   BP: (!) 141/65   Pulse: 71   Resp: 16   Temp: 97.7 °F (36.5 °C)   SpO2: 95%       PHYSICAL:   General: No acute distress  HEENT:  Unremarkable for age  Neck: without increased JVD, carotid pulses 2+ bilaterally without bruits  Heart: RRR, S1 & S2 WNL, S4 gallop, without murmurs or rubs    Lungs: Clear to auscultation    Abdomen: BS present, without HSM, masses, or tenderness    Extremities: without C,C,E.  Pulses 2+ bilaterally  Mental Status: Alert & Oriented        PLANNED PROCEDURE   []Cath  []PCI []Pacemaker/AICD  [x]ANNALEE             []Cardioversion []Peripheral angiography/PTA  []Other:      SEDATION  Planned agent:[x]Midazolam []Meperidine [x]Sublimaze []Morphine  []Diazepam  []Other:     ASA Classification:  []1 [x]2 []3 []4 []5  Class 1: A normal healthy patient  Class 2: Pt with mild to moderate systemic disease  Class 3: Severe systemic disease or disturbance  Class 4: Severe systemic disorders that are already life threatening. Class 5: Moribund pt with little chances of survival, for more than 24 hours. Mallampati I Airway Classification:   []1 [x]2 []3 []4    [x]Pre-procedure diagnostic studies complete and results available. Comment:    [x]Previous sedation/anesthesia experiences assessed. Comment:    [x]The patient is an appropriate candidate to undergo the planned procedure sedation and anesthesia. (Refer to nursing sedation/analgesia documentation record)  [x]Formulation and discussion of sedation/procedure plan, risks, and expectations with patient and/or responsible adult completed. [x]Patient examined immediately prior to the procedure.  (Refer to nursing sedation/analgesia documentation record)    Cruz Beltran MD MD [facility]  Electronically signed 1/19/2022 at 1:31 PM

## 2022-01-19 NOTE — PROGRESS NOTES
Recovery mode. Denies discomfort, taking fluids. Dr. Asha Frazier discussed findings and plan of care with patient and . Discharge instructions provided, understanding verbalized.

## 2022-01-20 DIAGNOSIS — Z95.818 PRESENCE OF WATCHMAN LEFT ATRIAL APPENDAGE CLOSURE DEVICE: Primary | ICD-10-CM

## 2022-01-24 RX ORDER — CLOPIDOGREL BISULFATE 75 MG/1
75 TABLET ORAL DAILY
Qty: 30 TABLET | Refills: 3 | Status: SHIPPED | OUTPATIENT
Start: 2022-01-24 | End: 2022-05-26 | Stop reason: SDUPTHER

## 2022-01-25 RX ORDER — VALSARTAN 160 MG/1
160 TABLET ORAL DAILY
Qty: 90 TABLET | Refills: 0 | Status: SHIPPED | OUTPATIENT
Start: 2022-01-25 | End: 2022-03-07 | Stop reason: SDUPTHER

## 2022-01-25 RX ORDER — ATORVASTATIN CALCIUM 10 MG/1
TABLET, FILM COATED ORAL
Qty: 90 TABLET | Refills: 0 | Status: SHIPPED | OUTPATIENT
Start: 2022-01-25 | End: 2022-03-07 | Stop reason: SDUPTHER

## 2022-01-25 RX ORDER — AMLODIPINE BESYLATE 5 MG/1
5 TABLET ORAL DAILY
Qty: 90 TABLET | Refills: 0 | Status: SHIPPED | OUTPATIENT
Start: 2022-01-25 | End: 2022-03-07 | Stop reason: ALTCHOICE

## 2022-01-25 NOTE — TELEPHONE ENCOUNTER
Drenda Gitelman called requesting a refill on the following medications:  Requested Prescriptions     Pending Prescriptions Disp Refills    valsartan (DIOVAN) 160 MG tablet 90 tablet 3     Sig: Take 1 tablet by mouth daily    atorvastatin (LIPITOR) 10 MG tablet 90 tablet 1     Sig: TAKE ONE TABLET BY MOUTH EVERY DAY    amLODIPine (NORVASC) 5 MG tablet 30 tablet 7     Sig: Take 1 tablet by mouth daily     [facility]      Date of last visit: 1/14/21  Date of next visit (if applicable): 7/7/2209

## 2022-02-10 RX ORDER — FLECAINIDE ACETATE 100 MG/1
100 TABLET ORAL 2 TIMES DAILY
Qty: 180 TABLET | Refills: 3 | Status: SHIPPED | OUTPATIENT
Start: 2022-02-10

## 2022-02-10 NOTE — TELEPHONE ENCOUNTER
Azael Ojeda called requesting a refill on the following medications:  Requested Prescriptions     Pending Prescriptions Disp Refills    flecainide (TAMBOCOR) 100 MG tablet 180 tablet 3     Sig: TAKE ONE TABLET BY MOUTH TWICE A DAY     [facility]    Date of last visit: 10/28/2021  Date of next visit (if applicable): 4/9/9759

## 2022-02-24 ENCOUNTER — HOSPITAL ENCOUNTER (OUTPATIENT)
Dept: AUDIOLOGY | Age: 87
Discharge: HOME | End: 2022-02-24

## 2022-02-24 PROCEDURE — 9990000010 HC NO CHARGE VISIT: Performed by: AUDIOLOGIST

## 2022-02-24 NOTE — PROGRESS NOTES
ACCOUNT #: [de-identified]    DIAGNOSIS: H90.3    Annual hearing aid check: The patient is reporting difficulty hearing conversational speech. He feels that his right hearing aid is weak. He is also requesting instruction on how to change his wax filters. His hearing aids were cleaned and checked. A listening check was completed and revealed no problems. The process of changing the wax filter on his hearing aids was demonstrated. The patient was then able to change a filter successfully. Programming adjustments were completed. The patient's user gain was previously set at 85%. The user gain was increased 10% in both hearing aids. The patient noticed a significant improvement and is pleased with the changes. He will follow up as needed. Annual hearing aid check recommended.

## 2022-03-07 ENCOUNTER — OFFICE VISIT (OUTPATIENT)
Dept: CARDIOLOGY CLINIC | Age: 87
End: 2022-03-07
Payer: MEDICARE

## 2022-03-07 VITALS
HEART RATE: 72 BPM | HEIGHT: 70 IN | WEIGHT: 169 LBS | SYSTOLIC BLOOD PRESSURE: 136 MMHG | DIASTOLIC BLOOD PRESSURE: 64 MMHG | BODY MASS INDEX: 24.2 KG/M2

## 2022-03-07 DIAGNOSIS — I25.118 CORONARY ARTERY DISEASE OF NATIVE ARTERY OF NATIVE HEART WITH STABLE ANGINA PECTORIS (HCC): ICD-10-CM

## 2022-03-07 DIAGNOSIS — I48.0 PAROXYSMAL ATRIAL FIBRILLATION (HCC): Primary | ICD-10-CM

## 2022-03-07 DIAGNOSIS — I71.21 ASCENDING AORTIC ANEURYSM: ICD-10-CM

## 2022-03-07 PROCEDURE — G8427 DOCREV CUR MEDS BY ELIG CLIN: HCPCS | Performed by: NUCLEAR MEDICINE

## 2022-03-07 PROCEDURE — 4040F PNEUMOC VAC/ADMIN/RCVD: CPT | Performed by: NUCLEAR MEDICINE

## 2022-03-07 PROCEDURE — G8484 FLU IMMUNIZE NO ADMIN: HCPCS | Performed by: NUCLEAR MEDICINE

## 2022-03-07 PROCEDURE — 99213 OFFICE O/P EST LOW 20 MIN: CPT | Performed by: NUCLEAR MEDICINE

## 2022-03-07 PROCEDURE — 1123F ACP DISCUSS/DSCN MKR DOCD: CPT | Performed by: NUCLEAR MEDICINE

## 2022-03-07 PROCEDURE — 1036F TOBACCO NON-USER: CPT | Performed by: NUCLEAR MEDICINE

## 2022-03-07 PROCEDURE — G8420 CALC BMI NORM PARAMETERS: HCPCS | Performed by: NUCLEAR MEDICINE

## 2022-03-07 RX ORDER — ASCORBIC ACID 500 MG
500 TABLET ORAL DAILY
COMMUNITY

## 2022-03-07 RX ORDER — ATORVASTATIN CALCIUM 10 MG/1
TABLET, FILM COATED ORAL
Qty: 90 TABLET | Refills: 3 | Status: SHIPPED | OUTPATIENT
Start: 2022-03-07 | End: 2022-04-25 | Stop reason: SDUPTHER

## 2022-03-07 RX ORDER — VALSARTAN 160 MG/1
160 TABLET ORAL DAILY
Qty: 90 TABLET | Refills: 3 | Status: SHIPPED | OUTPATIENT
Start: 2022-03-07 | End: 2022-04-25 | Stop reason: SDUPTHER

## 2022-03-07 RX ORDER — AMLODIPINE BESYLATE 5 MG/1
5 TABLET ORAL DAILY
Qty: 90 TABLET | Refills: 3 | Status: CANCELLED | OUTPATIENT
Start: 2022-03-07

## 2022-03-07 RX ORDER — CHOLECALCIFEROL (VITAMIN D3) 125 MCG
CAPSULE ORAL
COMMUNITY

## 2022-03-07 RX ORDER — ZINC SULFATE 50(220)MG
50 CAPSULE ORAL DAILY
COMMUNITY

## 2022-03-07 NOTE — PROGRESS NOTES
[address] [place] [address]  Dept: [phone]  Dept Fax: [phone]  Loc: [phone]    Visit Date: 3/7/2022    Nidhi Hendrickson is a 80 y.o. male who presents todayfor:  Chief Complaint   Patient presents with    Follow-up    Hypertension    Atrial Fibrillation     Had a watchman   Did well   Known ascending aneurysm and AI   As well moderate CAD  BP is stable   No dizziness  No syncope  Some constipation   ? ? meds     HPI:  HPI  Past Medical History:   Diagnosis Date    A-fib ([address]     Bronchitis     CAD (coronary artery disease)     atrial fib     Hyperlipidemia     Hypertension     Stroke (cerebrum) ([address]     Thyroid disease     Trigeminal neuralgia       Past Surgical History:   Procedure Laterality Date    APPENDECTOMY  1951   [facility] BRAIN SURGERY  2003    CHOLECYSTECTOMY      COLONOSCOPY  2013    CRANIOTOMY  2003    GALLBLADDER SURGERY  2010    KIDNEY STONE SURGERY  2003    TN LAP,INGUINAL HERNIA REPR,INITIAL Bilateral 8/16/2018    robotic bilateral inguinal hernia repair with mesh performed by Lj Peters MD at [address] [place] [address] TRANSESOPHAGEAL ECHOCARDIOGRAM N/A 1/19/2022    TRANSESOPHAGEAL ECHOCARDIOGRAM performed by Fátima Mendez MD at [facility]  2003     Family History   Problem Relation Age of Onset    Heart Disease Mother     Heart Attack Mother     Diabetes Father     Heart Disease Father     High Blood Pressure Father     Cancer Brother     Diabetes Brother     Heart Disease Brother     Diabetes Brother     Stroke Brother      Social History     Tobacco Use    Smoking status: Never Smoker    Smokeless tobacco: Never Used   Substance Use Topics    Alcohol use: No      Current Outpatient Medications   Medication Sig Dispense Refill    Cholecalciferol (VITAMIN D) 125 MCG (5000 UT) CAPS Take by mouth      vitamin C (ASCORBIC ACID) 500 MG tablet Take 500 mg by mouth daily      zinc sulfate (ZINCATE) 220 (50 Zn) MG capsule Take 50 mg by mouth daily      flecainide (TAMBOCOR) 100 MG tablet Take 1 tablet by mouth 2 times daily TAKE ONE TABLET BY MOUTH TWICE A  tablet 3    valsartan (DIOVAN) 160 MG tablet Take 1 tablet by mouth daily 90 tablet 0    atorvastatin (LIPITOR) 10 MG tablet TAKE ONE TABLET BY MOUTH EVERY DAY 90 tablet 0    amLODIPine (NORVASC) 5 MG tablet Take 1 tablet by mouth daily 90 tablet 0    clopidogrel (PLAVIX) 75 MG tablet Take 1 tablet by mouth daily 30 tablet 3    aspirin 81 MG chewable tablet Take 1 tablet by mouth daily 30 tablet 3    levothyroxine (SYNTHROID) 25 MCG tablet Take 0.5 tablets by mouth Daily 45 tablet 3    MULTIPLE VITAMIN PO Take 1 tablet by mouth daily        No current facility-administered medications for this visit.      Allergies   Allergen Reactions    Prednisone      Raised BP     Health Maintenance   Topic Date Due    DTaP/Tdap/Td vaccine (1 - Tdap) Never done    COVID-19 Vaccine (3 - Booster for Moderna series) 07/18/2021    Lipid screen  05/10/2022    TSH testing  07/23/2022    Depression Screen  08/31/2022    Annual Wellness Visit (AWV)  09/01/2022    Potassium monitoring  12/01/2022    Creatinine monitoring  12/01/2022    Flu vaccine  Completed    Shingles Vaccine  Completed    Pneumococcal 65+ years Vaccine  Completed    Hepatitis A vaccine  Aged Out    Hepatitis B vaccine  Aged Out    Hib vaccine  Aged Out    Meningococcal (ACWY) vaccine  Aged Out       Subjective:  Review of Systems  General:   No fever, no chills, No fatigue or weight loss  Pulmonary:    No dyspnea, no wheezing  Cardiac:    Denies recent chest pain,   GI:     No nausea or vomiting, no abdominal pain  Neuro:    No dizziness or light headedness,   Musculoskeletal:  No recent active issues  Extremities:   No edema, no obvious claudication       Objective:  Physical Exam  /64   Pulse 72   Ht 5' 10\" (1.778 m)   Wt 169 lb (76.7 kg)   BMI 24.25 kg/m²   General:   Well developed, well nourished  Lungs:   Clear to auscultation  Heart:    Normal S1 S2, Slight murmur. no rubs, no gallops  Abdomen:   Soft, non tender, no organomegalies, positive bowel sounds  Extremities:   No edema, no cyanosis, good peripheral pulses  Neurological:   Awake, alert, oriented. No obvious focal deficits  Musculoskelatal:  No obvious deformities    Assessment:      Diagnosis Orders   1. Paroxysmal atrial fibrillation (HCC)     2. Ascending aortic aneurysm ([address]     3. Coronary artery disease of native artery of native heart with stable angina pectoris ([address]     as above  Possible constipation from norvasc      Plan:  No follow-ups on file. Consider stopping amlodipine  And increasing diovan   Continue risk factor modification and medical management    Thank you for allowing me to participate in the care of your patient. Please don't hesitate to contact me regarding any further issues related to the patient care    Orders Placed:  No orders of the defined types were placed in this encounter. Medications Prescribed:  No orders of the defined types were placed in this encounter. Discussed use, benefit, and side effects of prescribed medications. All patient questions answered. Pt voicedunderstanding. Instructed to continue current medications, diet and exercise. Continue risk factor modification and medical management. Patient agreed with treatment plan. Follow up as directed.     Electronically signedby Gil Kruger MD on 3/7/2022 at 11:59 AM

## 2022-04-07 ENCOUNTER — OFFICE VISIT (OUTPATIENT)
Dept: FAMILY MEDICINE CLINIC | Age: 87
End: 2022-04-07
Payer: MEDICARE

## 2022-04-07 VITALS
BODY MASS INDEX: 24.37 KG/M2 | SYSTOLIC BLOOD PRESSURE: 134 MMHG | HEIGHT: 70 IN | WEIGHT: 170.2 LBS | DIASTOLIC BLOOD PRESSURE: 80 MMHG | OXYGEN SATURATION: 95 % | HEART RATE: 88 BPM | RESPIRATION RATE: 18 BRPM | TEMPERATURE: 97.4 F

## 2022-04-07 DIAGNOSIS — J20.9 ACUTE BRONCHITIS, UNSPECIFIED ORGANISM: Primary | ICD-10-CM

## 2022-04-07 DIAGNOSIS — H61.23 BILATERAL IMPACTED CERUMEN: ICD-10-CM

## 2022-04-07 PROBLEM — M75.102 UNSPECIFIED ROTATOR CUFF TEAR OR RUPTURE OF LEFT SHOULDER, NOT SPECIFIED AS TRAUMATIC: Status: ACTIVE | Noted: 2022-04-07

## 2022-04-07 PROCEDURE — 99213 OFFICE O/P EST LOW 20 MIN: CPT | Performed by: FAMILY MEDICINE

## 2022-04-07 PROCEDURE — G8420 CALC BMI NORM PARAMETERS: HCPCS | Performed by: FAMILY MEDICINE

## 2022-04-07 PROCEDURE — 69210 REMOVE IMPACTED EAR WAX UNI: CPT | Performed by: FAMILY MEDICINE

## 2022-04-07 PROCEDURE — G8427 DOCREV CUR MEDS BY ELIG CLIN: HCPCS | Performed by: FAMILY MEDICINE

## 2022-04-07 PROCEDURE — 4040F PNEUMOC VAC/ADMIN/RCVD: CPT | Performed by: FAMILY MEDICINE

## 2022-04-07 PROCEDURE — 1036F TOBACCO NON-USER: CPT | Performed by: FAMILY MEDICINE

## 2022-04-07 PROCEDURE — 1123F ACP DISCUSS/DSCN MKR DOCD: CPT | Performed by: FAMILY MEDICINE

## 2022-04-07 RX ORDER — AZITHROMYCIN 250 MG/1
250 TABLET, FILM COATED ORAL SEE ADMIN INSTRUCTIONS
Qty: 6 TABLET | Refills: 0 | Status: SHIPPED | OUTPATIENT
Start: 2022-04-07 | End: 2022-04-12

## 2022-04-07 RX ORDER — GUAIFENESIN AND CODEINE PHOSPHATE 100; 10 MG/5ML; MG/5ML
5 SOLUTION ORAL EVERY 4 HOURS PRN
Qty: 180 ML | Refills: 0 | Status: SHIPPED | OUTPATIENT
Start: 2022-04-07 | End: 2022-04-14

## 2022-04-07 ASSESSMENT — ENCOUNTER SYMPTOMS
RHINORRHEA: 0
WHEEZING: 0
NAUSEA: 0
SHORTNESS OF BREATH: 0
CONSTIPATION: 0
DIARRHEA: 0
ABDOMINAL PAIN: 0
SORE THROAT: 0
COUGH: 1

## 2022-04-07 NOTE — PROGRESS NOTES
[address] [place] [address]  Dept: [phone]  Loc: [address] (:  1934) is a 80 y.o. male, here for evaluation of the following chief complaint(s):  Cough (ongoing x 1 week, non productive) and Congestion (chest congestion)      ASSESSMENT/PLAN:  1. Acute bronchitis, unspecified organism  -     guaiFENesin-codeine (CHERATUSSIN AC) 100-10 MG/5ML syrup; Take 5 mLs by mouth every 4 hours as needed for Cough for up to 7 days. , Disp-180 mL, R-0Normal  -     azithromycin (ZITHROMAX) 250 MG tablet; Take 1 tablet by mouth See Admin Instructions for 5 days 500mg on day 1 followed by 250mg on days 2 - 5, Disp-6 tablet, R-0Normal  2. Bilateral impacted cerumen  -     93735 - NM REMOVE IMPACTED EAR WAX  cough syrup  zpak  Ears flushed    No follow-ups on file. SUBJECTIVE/OBJECTIVE:  Cough  This is a new problem. The current episode started 1 to 4 weeks ago. The problem has been unchanged. The problem occurs every few minutes. The cough is non-productive. Associated symptoms include ear congestion and postnasal drip. Pertinent negatives include no chest pain, chills, ear pain, fever, headaches, myalgias, nasal congestion, rhinorrhea, sore throat, shortness of breath or wheezing. The symptoms are aggravated by lying down. He has tried nothing for the symptoms. The treatment provided no relief. Review of Systems   Constitutional: Negative for chills, fatigue and fever. HENT: Positive for postnasal drip. Negative for congestion, ear pain, rhinorrhea and sore throat. Respiratory: Positive for cough. Negative for shortness of breath and wheezing. Cardiovascular: Negative for chest pain and palpitations. Gastrointestinal: Negative for abdominal pain, constipation, diarrhea and nausea. Genitourinary: Negative for dysuria and hematuria.    Musculoskeletal: Negative for arthralgias and myalgias. Neurological: Negative for dizziness and headaches. Psychiatric/Behavioral: Negative for sleep disturbance. The patient is not nervous/anxious. Physical Exam  Vitals and nursing note reviewed. Constitutional:       Appearance: He is well-developed. HENT:      Head: Normocephalic and atraumatic. Right Ear: There is impacted cerumen. Left Ear: There is impacted cerumen. Nose: Congestion present. Eyes:      General: No scleral icterus. Right eye: No discharge. Left eye: No discharge. Conjunctiva/sclera: Conjunctivae normal.   Cardiovascular:      Rate and Rhythm: Normal rate and regular rhythm. Heart sounds: Normal heart sounds. Pulmonary:      Effort: Pulmonary effort is normal.      Breath sounds: Normal breath sounds. Decreased air movement present. No wheezing. Skin:     General: Skin is warm and dry. Neurological:      Mental Status: He is alert and oriented to person, place, and time. Mental status is at baseline. Psychiatric:         Behavior: Behavior normal.         Thought Content: Thought content normal.         Judgment: Judgment normal.     Ear Cerumen Removal Procedure Note    Indication: ear cerumen impaction    Procedure: After placing the patient's head in the appropriate position, the patient's right ear canal was irrigated with the appropriate solution until the majority of the cerumen was flushed out of the canal.  The other ear canal also had cerumen present and was irrigated with the appropriate solution and curetted until the majority of the cerumen was flushed out of the canal. At this point the procedure was complete. The patient tolerated the procedure well. Complications: None          Vitals:    04/07/22 1414   BP: 134/80   Pulse: 88   Resp: 18   Temp: 97.4 °F (36.3 °C)   SpO2: 95%              An electronic signature was used to authenticate this note.     --Rakan Bearden MD

## 2022-04-25 RX ORDER — VALSARTAN 160 MG/1
160 TABLET ORAL DAILY
Qty: 90 TABLET | Refills: 3 | Status: SHIPPED | OUTPATIENT
Start: 2022-04-25

## 2022-04-25 RX ORDER — ATORVASTATIN CALCIUM 10 MG/1
TABLET, FILM COATED ORAL
Qty: 90 TABLET | Refills: 3 | Status: SHIPPED | OUTPATIENT
Start: 2022-04-25

## 2022-05-20 ENCOUNTER — TELEPHONE (OUTPATIENT)
Dept: CARDIOLOGY CLINIC | Age: 87
End: 2022-05-20

## 2022-05-20 DIAGNOSIS — I48.20 CHRONIC ATRIAL FIBRILLATION (HCC): Primary | ICD-10-CM

## 2022-05-20 NOTE — TELEPHONE ENCOUNTER
Orders received from Dr. Capo Bennett to schedule the patient for a 1 year post Watchman follow up appointment with an ANNALEE, CBC and BMP prior. Watchman implanted on 12/1/2021  Appointment is scheduled for 12/5/2022 at 0930.   ANNALEE scheduled for     Molly, would you be willing to schedule this ANNALEE?

## 2022-05-23 NOTE — TELEPHONE ENCOUNTER
Charles Aguilar is scheduled for 11.16.2022 at 3:30pm arrival time 2pm.   Patient has been notified and instructions mailed to the patient.

## 2022-05-26 DIAGNOSIS — Z95.818 PRESENCE OF WATCHMAN LEFT ATRIAL APPENDAGE CLOSURE DEVICE: ICD-10-CM

## 2022-05-27 DIAGNOSIS — Z95.818 PRESENCE OF WATCHMAN LEFT ATRIAL APPENDAGE CLOSURE DEVICE: ICD-10-CM

## 2022-05-27 RX ORDER — CLOPIDOGREL BISULFATE 75 MG/1
TABLET ORAL
Qty: 30 TABLET | Refills: 3 | OUTPATIENT
Start: 2022-05-27

## 2022-05-31 RX ORDER — CLOPIDOGREL BISULFATE 75 MG/1
75 TABLET ORAL DAILY
Qty: 30 TABLET | Refills: 0 | OUTPATIENT
Start: 2022-05-31 | End: 2022-06-01 | Stop reason: ALTCHOICE

## 2022-06-01 ENCOUNTER — TELEPHONE (OUTPATIENT)
Dept: CARDIOLOGY CLINIC | Age: 87
End: 2022-06-01

## 2022-06-01 NOTE — TELEPHONE ENCOUNTER
This patient has completed 6 months of DAPT post WATCHMAN. According to the [place] pharmacological guidelines it is recommended to stop Plavix and continue Aspirin 81 mg daily. After reviewing the patient's chart there is no further indication to continue Plavix. Dr. Linda Roach are you ok with stopping Plavix and continuing Aspirin 81 mg daily?

## 2022-06-02 ENCOUNTER — OFFICE VISIT (OUTPATIENT)
Dept: FAMILY MEDICINE CLINIC | Age: 87
End: 2022-06-02
Payer: MEDICARE

## 2022-06-02 VITALS
SYSTOLIC BLOOD PRESSURE: 140 MMHG | HEART RATE: 70 BPM | BODY MASS INDEX: 24.05 KG/M2 | DIASTOLIC BLOOD PRESSURE: 74 MMHG | RESPIRATION RATE: 16 BRPM | HEIGHT: 70 IN | WEIGHT: 168 LBS | OXYGEN SATURATION: 99 %

## 2022-06-02 DIAGNOSIS — M54.2 NECK PAIN: Primary | ICD-10-CM

## 2022-06-02 PROCEDURE — G8427 DOCREV CUR MEDS BY ELIG CLIN: HCPCS | Performed by: FAMILY MEDICINE

## 2022-06-02 PROCEDURE — 1036F TOBACCO NON-USER: CPT | Performed by: FAMILY MEDICINE

## 2022-06-02 PROCEDURE — 99213 OFFICE O/P EST LOW 20 MIN: CPT | Performed by: FAMILY MEDICINE

## 2022-06-02 PROCEDURE — 96372 THER/PROPH/DIAG INJ SC/IM: CPT | Performed by: FAMILY MEDICINE

## 2022-06-02 PROCEDURE — G8420 CALC BMI NORM PARAMETERS: HCPCS | Performed by: FAMILY MEDICINE

## 2022-06-02 PROCEDURE — 1123F ACP DISCUSS/DSCN MKR DOCD: CPT | Performed by: FAMILY MEDICINE

## 2022-06-02 RX ORDER — TIZANIDINE 2 MG/1
2 TABLET ORAL 3 TIMES DAILY PRN
Qty: 30 TABLET | Refills: 0 | Status: SHIPPED | OUTPATIENT
Start: 2022-06-02

## 2022-06-02 RX ORDER — KETOROLAC TROMETHAMINE 30 MG/ML
60 INJECTION, SOLUTION INTRAMUSCULAR; INTRAVENOUS ONCE
Status: COMPLETED | OUTPATIENT
Start: 2022-06-02 | End: 2022-06-02

## 2022-06-02 RX ADMIN — KETOROLAC TROMETHAMINE 60 MG: 30 INJECTION, SOLUTION INTRAMUSCULAR; INTRAVENOUS at 10:42

## 2022-06-02 ASSESSMENT — ENCOUNTER SYMPTOMS
SORE THROAT: 0
WHEEZING: 0
CONSTIPATION: 0
ABDOMINAL PAIN: 0
DIARRHEA: 0
NAUSEA: 0
COUGH: 0
SHORTNESS OF BREATH: 0
RHINORRHEA: 0

## 2022-06-02 NOTE — PROGRESS NOTES
Administrations This Visit     ketorolac (TORADOL) injection 60 mg     Admin Date  06/02/2022  10:42 Action  Given Dose  60 mg Route  IntraMUSCular Site  Dorsogluteal Left Administered By  Nery Medel LPN    Ordering Provider: Darline Mireles MD    [address]: [id number]    Lot#: [id number]    : [facility]    Patient Supplied?: No                Patient instructed to report any adverse reaction to me immediately.

## 2022-06-02 NOTE — PROGRESS NOTES
[address] [place] [address]  Dept: [phone]  Loc: [address] (:  1934) is a 80 y.o. male, here for evaluation of the following chief complaint(s):  Neck Pain      ASSESSMENT/PLAN:  1. Neck pain  -     XR CERVICAL SPINE 1 VW; Future  -     ketorolac (TORADOL) injection 60 mg; 60 mg, IntraMUSCular, ONCE, 1 dose, On Thu 22 at 1100Do not administer for more than 5 days. -     tiZANidine (ZANAFLEX) 2 MG tablet; Take 1 tablet by mouth 3 times daily as needed (muscle spasm), Disp-30 tablet, R-0Normal    toradol today. Muscle relaxer prn  Xray if not improved in next day or two  Icy hot as needed. No follow-ups on file. SUBJECTIVE/OBJECTIVE:  HPI  Presents with complaints of right-sided neck pain. States that a few days ago he fell asleep in a chair in his head was in an awkward position putting a strain on his neck. States that he has had surgery of the cervical spine in the past and did have a plate placed. He wants to make sure that he did not mess this up. Denies an actual headache. Denies any numbness and tingling of his right upper extremity. Denies weakness. Denies any chest pain or shortness of breath. Denies visual changes. States that there is a very well localized area at the base of his skull and his upper neck which is tender to palpation. Review of Systems   Constitutional: Positive for activity change. Negative for appetite change, chills, fatigue and fever. HENT: Negative for congestion, rhinorrhea and sore throat. Respiratory: Negative for cough, shortness of breath and wheezing. Cardiovascular: Negative for chest pain and palpitations. Gastrointestinal: Negative for abdominal pain, constipation, diarrhea and nausea. Musculoskeletal: Positive for myalgias, neck pain and neck stiffness. Negative for arthralgias.    Neurological: Negative for dizziness, weakness, numbness and headaches. Psychiatric/Behavioral: Negative for sleep disturbance. Physical Exam  Vitals and nursing note reviewed. Constitutional:       Appearance: He is well-developed. HENT:      Head: Normocephalic and atraumatic. Eyes:      General: No scleral icterus. Right eye: No discharge. Left eye: No discharge. Conjunctiva/sclera: Conjunctivae normal.   Neck:     Cardiovascular:      Rate and Rhythm: Normal rate and regular rhythm. Heart sounds: Normal heart sounds. Pulmonary:      Effort: Pulmonary effort is normal.      Breath sounds: Normal breath sounds. No wheezing. Musculoskeletal:      Cervical back: No edema, erythema or crepitus. Pain with movement and muscular tenderness present. No spinous process tenderness. Skin:     General: Skin is warm and dry. Neurological:      Mental Status: He is alert and oriented to person, place, and time. Mental status is at baseline. Psychiatric:         Behavior: Behavior normal.         Thought Content: Thought content normal.         Judgment: Judgment normal.         Vitals:    06/02/22 1010   BP: (!) 140/74   Pulse:    Resp:    SpO2:               An electronic signature was used to authenticate this note.     --Adarsh Leavitt MD

## 2022-06-09 ENCOUNTER — HOSPITAL ENCOUNTER (OUTPATIENT)
Age: 87
Discharge: HOME | End: 2022-06-09
Payer: MEDICARE

## 2022-06-09 ENCOUNTER — HOSPITAL ENCOUNTER (OUTPATIENT)
Dept: GENERAL RADIOLOGY | Age: 87
Discharge: HOME | End: 2022-06-09
Payer: MEDICARE

## 2022-06-09 DIAGNOSIS — M54.2 NECK PAIN: ICD-10-CM

## 2022-06-09 PROCEDURE — 72040 X-RAY EXAM NECK SPINE 2-3 VW: CPT

## 2022-06-23 ENCOUNTER — HOSPITAL ENCOUNTER (OUTPATIENT)
Dept: GENERAL RADIOLOGY | Age: 87
Discharge: HOME | End: 2022-06-23
Payer: MEDICARE

## 2022-06-23 ENCOUNTER — OFFICE VISIT (OUTPATIENT)
Dept: FAMILY MEDICINE CLINIC | Age: 87
End: 2022-06-23
Payer: MEDICARE

## 2022-06-23 VITALS
BODY MASS INDEX: 23.62 KG/M2 | HEIGHT: 70 IN | RESPIRATION RATE: 18 BRPM | TEMPERATURE: 97.7 F | OXYGEN SATURATION: 99 % | DIASTOLIC BLOOD PRESSURE: 76 MMHG | WEIGHT: 165 LBS | HEART RATE: 72 BPM | SYSTOLIC BLOOD PRESSURE: 132 MMHG

## 2022-06-23 VITALS
DIASTOLIC BLOOD PRESSURE: 56 MMHG | BODY MASS INDEX: 23.62 KG/M2 | OXYGEN SATURATION: 96 % | WEIGHT: 165 LBS | RESPIRATION RATE: 16 BRPM | TEMPERATURE: 97.4 F | HEIGHT: 70 IN | SYSTOLIC BLOOD PRESSURE: 120 MMHG | HEART RATE: 48 BPM

## 2022-06-23 DIAGNOSIS — U07.1 COVID: ICD-10-CM

## 2022-06-23 DIAGNOSIS — U07.1 COVID: Primary | ICD-10-CM

## 2022-06-23 DIAGNOSIS — R53.83 FATIGUE, UNSPECIFIED TYPE: ICD-10-CM

## 2022-06-23 LAB
Lab: ABNORMAL
QC PASS/FAIL: ABNORMAL
SARS-COV-2 RDRP RESP QL NAA+PROBE: POSITIVE

## 2022-06-23 PROCEDURE — G8420 CALC BMI NORM PARAMETERS: HCPCS | Performed by: NURSE PRACTITIONER

## 2022-06-23 PROCEDURE — G8427 DOCREV CUR MEDS BY ELIG CLIN: HCPCS | Performed by: NURSE PRACTITIONER

## 2022-06-23 PROCEDURE — 87635 SARS-COV-2 COVID-19 AMP PRB: CPT | Performed by: NURSE PRACTITIONER

## 2022-06-23 PROCEDURE — 99213 OFFICE O/P EST LOW 20 MIN: CPT | Performed by: NURSE PRACTITIONER

## 2022-06-23 PROCEDURE — 1123F ACP DISCUSS/DSCN MKR DOCD: CPT | Performed by: NURSE PRACTITIONER

## 2022-06-23 PROCEDURE — M0222 HC BEBTELOVIMAB INJECTION: HCPCS

## 2022-06-23 PROCEDURE — 6360000002 HC RX W HCPCS: Performed by: NURSE PRACTITIONER

## 2022-06-23 PROCEDURE — 96374 THER/PROPH/DIAG INJ IV PUSH: CPT

## 2022-06-23 PROCEDURE — 1036F TOBACCO NON-USER: CPT | Performed by: NURSE PRACTITIONER

## 2022-06-23 RX ORDER — DIPHENHYDRAMINE HYDROCHLORIDE 50 MG/ML
50 INJECTION INTRAMUSCULAR; INTRAVENOUS
OUTPATIENT
Start: 2022-06-23

## 2022-06-23 RX ORDER — BEBTELOVIMAB 87.5 MG/ML
175 INJECTION, SOLUTION INTRAVENOUS ONCE
Status: CANCELLED | OUTPATIENT
Start: 2022-06-23 | End: 2022-06-23

## 2022-06-23 RX ORDER — ACETAMINOPHEN 325 MG/1
650 TABLET ORAL
OUTPATIENT
Start: 2022-06-23

## 2022-06-23 RX ORDER — ALBUTEROL SULFATE 90 UG/1
4 AEROSOL, METERED RESPIRATORY (INHALATION) PRN
OUTPATIENT
Start: 2022-06-23

## 2022-06-23 RX ORDER — SODIUM CHLORIDE 0.9 % (FLUSH) 0.9 %
5-40 SYRINGE (ML) INJECTION PRN
OUTPATIENT
Start: 2022-06-23

## 2022-06-23 RX ORDER — SODIUM CHLORIDE 9 MG/ML
INJECTION, SOLUTION INTRAVENOUS CONTINUOUS
OUTPATIENT
Start: 2022-06-23

## 2022-06-23 RX ORDER — EPINEPHRINE 1 MG/ML
0.3 INJECTION, SOLUTION, CONCENTRATE INTRAVENOUS PRN
OUTPATIENT
Start: 2022-06-23

## 2022-06-23 RX ORDER — BEBTELOVIMAB 87.5 MG/ML
175 INJECTION, SOLUTION INTRAVENOUS ONCE
Status: COMPLETED | OUTPATIENT
Start: 2022-06-23 | End: 2022-06-23

## 2022-06-23 RX ORDER — ONDANSETRON 2 MG/ML
8 INJECTION INTRAMUSCULAR; INTRAVENOUS
OUTPATIENT
Start: 2022-06-23

## 2022-06-23 RX ADMIN — BEBTELOVIMAB 175 MG: 87.5 INJECTION, SOLUTION INTRAVENOUS at 15:34

## 2022-06-23 ASSESSMENT — PATIENT HEALTH QUESTIONNAIRE - PHQ9
SUM OF ALL RESPONSES TO PHQ QUESTIONS 1-9: 0
1. LITTLE INTEREST OR PLEASURE IN DOING THINGS: 0
SUM OF ALL RESPONSES TO PHQ QUESTIONS 1-9: 0
SUM OF ALL RESPONSES TO PHQ QUESTIONS 1-9: 0
SUM OF ALL RESPONSES TO PHQ9 QUESTIONS 1 & 2: 0
2. FEELING DOWN, DEPRESSED OR HOPELESS: 0
SUM OF ALL RESPONSES TO PHQ QUESTIONS 1-9: 0

## 2022-06-23 NOTE — PROGRESS NOTES
Arrives to [facility] for IV Bebtelovimab due to +COVID as of today. VSS. Denies pain. Afebrile. Gave patient the EUA for Bebtelovimab to read over prior to receiving. Call light in reach.

## 2022-06-23 NOTE — PROGRESS NOTES
Patient agrees to received the IV Bebtelovimab. Medication administered. Observation for 1 hour begins at this time. Warm blanket and remote for TV given.

## 2022-06-23 NOTE — PROGRESS NOTES
_Met___ Safety:       (Environmental)   Magna to environment   Ensure ID band is correct and in place/ allergy band as needed   Assess for fall risk   Initiate fall precautions as applicable (fall band, side rails, etc.)   Call light within reach   Bed in low position/ wheels locked    _Met___ Pain:     Denies pain   Assess pain level and characteristics   Administer analgesics as ordered   Assess effectiveness of pain management and report to MD as needed    _Met___ Knowledge Deficit:   Assess baseline knowledge   Provide teaching at level of understanding   Provide teaching via preferred learning method   Evaluate teaching effectiveness    _Met__ Hemodynamic/Respiratory Status:       (Pre and Post Procedure Monitoring)   Assess/Monitor vital signs and LOC   Assess Baseline SpO2 prior to any sedation   Obtain weight/height   Assess vital signs/ LOC until patient meets discharge criteria   Monitor procedure site and notify MD of any issues    _Met___ Infection-Risk of Peripheral Venous Catheter:   Monitor for infection signs and symptoms (catheter site redness, temperature elevation, etc)   Assess for infection risks   Educate regarding infection prevention   Manage peripheral venous catheter (flushes/ dressing changes per protocol)

## 2022-06-23 NOTE — PROGRESS NOTES
Observation time completed. Denies any reaction symptoms listed on the EUA.  VSS. Denies pain. Called patients wife Oziel Like [phone] who will be on her way to  [place] at this time.

## 2022-06-23 NOTE — PROGRESS NOTES
Jerel Love (:  1934) is a 80 y.o. male,Established patient, here for evaluation of the following chief complaint(s):  Fatigue         ASSESSMENT/PLAN:  1. COVID  2. Fatigue, unspecified type  -     POCT COVID-19 Rapid, NAAT    covid pos  Discussed antibody infusion. Pt notified this is not FDA approved but has shown some effectiveness. Pt ok to go ahead with infusion. Set up for 3 pm at [facility]. Fluids  Quarantine  Tylenol for discomfort    Return if symptoms worsen or fail to improve. Subjective   SUBJECTIVE/OBJECTIVE:  HPI    Cough and congestion and fatigue. Started last weekend. Cough was persistent. No fever, sob or cp. Not taking anything. No known exposures. Review of Systems   Constitutional: Positive for fatigue. Negative for activity change, appetite change, chills, diaphoresis, fever and unexpected weight change. HENT: Positive for congestion. Negative for ear pain, postnasal drip, sinus pressure and sore throat. Eyes: Negative for visual disturbance. Respiratory: Positive for cough. Negative for chest tightness, shortness of breath, wheezing and stridor. Cardiovascular: Negative for chest pain, palpitations and leg swelling. Gastrointestinal: Negative for abdominal distention, abdominal pain, constipation, diarrhea, nausea and vomiting. Endocrine: Negative for polydipsia, polyphagia and polyuria. Genitourinary: Negative for decreased urine volume, difficulty urinating, dysuria, flank pain, frequency, hematuria and urgency. Musculoskeletal: Negative for arthralgias, back pain, gait problem, joint swelling, myalgias and neck pain. Skin: Negative for color change, pallor and rash. Neurological: Negative for dizziness, syncope, weakness, light-headedness, numbness and headaches. Hematological: Negative for adenopathy. Psychiatric/Behavioral: Negative for behavioral problems, self-injury and sleep disturbance.  The patient is not nervous/anxious. Objective   Physical Exam  Vitals reviewed. Constitutional:       General: He is not in acute distress. Appearance: Normal appearance. He is well-developed. HENT:      Head: Normocephalic. Right Ear: External ear normal.      Left Ear: External ear normal.      Nose: Congestion present. Right Sinus: No maxillary sinus tenderness. Left Sinus: No maxillary sinus tenderness. Mouth/Throat:      Pharynx: Uvula midline. Neck:      Trachea: Trachea normal.   Cardiovascular:      Rate and Rhythm: Normal rate and regular rhythm. Heart sounds: Normal heart sounds. No murmur heard. Pulmonary:      Effort: Pulmonary effort is normal. No respiratory distress. Breath sounds: Normal breath sounds. No decreased breath sounds, wheezing, rhonchi or rales. Chest:      Chest wall: No tenderness. Abdominal:      General: There is no distension. Palpations: Abdomen is soft. There is no mass. Tenderness: There is no abdominal tenderness. Musculoskeletal:         General: No tenderness or deformity. Normal range of motion. Cervical back: Normal range of motion and neck supple. Lymphadenopathy:      Cervical: No cervical adenopathy. Skin:     General: Skin is warm and dry. Neurological:      Mental Status: He is alert and oriented to person, place, and time. Motor: No abnormal muscle tone. Coordination: Coordination normal.      Gait: Gait normal.   Psychiatric:         Mood and Affect: Mood normal.         Behavior: Behavior normal.         Thought Content: Thought content normal.         Judgment: Judgment normal.            On this date 6/23/2022 I have spent 25 minutes reviewing previous notes, test results and face to face with the patient discussing the diagnosis and importance of compliance with the treatment plan as well as documenting on the day of the visit.       An electronic signature was used to authenticate this note.    --Adarsh Randolph, APRN - CNP

## 2022-06-27 ASSESSMENT — ENCOUNTER SYMPTOMS
CONSTIPATION: 0
SORE THROAT: 0
COUGH: 1
WHEEZING: 0
ABDOMINAL DISTENTION: 0
NAUSEA: 0
SHORTNESS OF BREATH: 0
VOMITING: 0
SINUS PRESSURE: 0
COLOR CHANGE: 0
CHEST TIGHTNESS: 0
BACK PAIN: 0
STRIDOR: 0
DIARRHEA: 0
ABDOMINAL PAIN: 0

## 2022-06-29 ENCOUNTER — OFFICE VISIT (OUTPATIENT)
Dept: FAMILY MEDICINE CLINIC | Age: 87
End: 2022-06-29
Payer: MEDICARE

## 2022-06-29 VITALS
HEART RATE: 63 BPM | TEMPERATURE: 97.7 F | BODY MASS INDEX: 23.39 KG/M2 | DIASTOLIC BLOOD PRESSURE: 76 MMHG | SYSTOLIC BLOOD PRESSURE: 150 MMHG | OXYGEN SATURATION: 96 % | WEIGHT: 163 LBS | RESPIRATION RATE: 20 BRPM

## 2022-06-29 DIAGNOSIS — R42 ORTHOSTATIC DIZZINESS: ICD-10-CM

## 2022-06-29 DIAGNOSIS — I10 HYPERTENSION, UNSPECIFIED TYPE: Primary | ICD-10-CM

## 2022-06-29 PROCEDURE — G8427 DOCREV CUR MEDS BY ELIG CLIN: HCPCS | Performed by: FAMILY MEDICINE

## 2022-06-29 PROCEDURE — G8420 CALC BMI NORM PARAMETERS: HCPCS | Performed by: FAMILY MEDICINE

## 2022-06-29 PROCEDURE — 1123F ACP DISCUSS/DSCN MKR DOCD: CPT | Performed by: FAMILY MEDICINE

## 2022-06-29 PROCEDURE — 99214 OFFICE O/P EST MOD 30 MIN: CPT | Performed by: FAMILY MEDICINE

## 2022-06-29 PROCEDURE — 1036F TOBACCO NON-USER: CPT | Performed by: FAMILY MEDICINE

## 2022-06-29 RX ORDER — HYDRALAZINE HYDROCHLORIDE 10 MG/1
10 TABLET, FILM COATED ORAL 4 TIMES DAILY PRN
Qty: 90 TABLET | Refills: 0 | Status: SHIPPED | OUTPATIENT
Start: 2022-06-29 | End: 2023-06-29

## 2022-06-29 ASSESSMENT — ENCOUNTER SYMPTOMS
ABDOMINAL PAIN: 0
CONSTIPATION: 0
RHINORRHEA: 0
SHORTNESS OF BREATH: 0
WHEEZING: 0
DIARRHEA: 0
NAUSEA: 0
COUGH: 1
SORE THROAT: 0

## 2022-06-29 NOTE — PROGRESS NOTES
[address] [place] [address]  Dept: [phone]  Loc: [address] (:  1934) is a 80 y.o. male, here for evaluation of the following chief complaint(s):  Dizziness (started this morning and not going away)      ASSESSMENT/PLAN:  1. Hypertension, unspecified type  -     hydrALAZINE (APRESOLINE) 10 MG tablet; Take 1 tablet by mouth 4 times daily as needed (blood pressure >763 systolic), AMBQ-05 tablet, R-0Normal  2. Orthostatic dizziness  -     hydrALAZINE (APRESOLINE) 10 MG tablet; Take 1 tablet by mouth 4 times daily as needed (blood pressure >171 systolic), BHUO-29 tablet, R-0Normal  change positions slowly. Avoid bending at waist  Check bp if symptoms or 2x daily. Hydralazine if needed for systolic >129  Orthostatics positive    No follow-ups on file. SUBJECTIVE/OBJECTIVE:  Patient presents today with complaints of dizziness. He recently was diagnosed with COVID-19 and is recovering well but still has been noticing dizziness. He does state that he notices he is off balance when he is standing and walking. Dizziness is worse when standing from seated position or when bending over. He states that he has been checking his blood pressure and it has stayed higher than usual today. Was as high as 938 systolic this morning. He denies any chest pain or shortness of breath. Still has minimal cough. Review of Systems   Constitutional: Positive for fatigue. Negative for chills and fever. HENT: Negative for congestion, rhinorrhea and sore throat. Respiratory: Positive for cough. Negative for shortness of breath and wheezing. Cardiovascular: Negative for chest pain and palpitations. Gastrointestinal: Negative for abdominal pain, constipation, diarrhea and nausea. Genitourinary: Negative for dysuria and hematuria. Musculoskeletal: Positive for myalgias.  Negative for arthralgias. Neurological: Positive for dizziness and light-headedness. Negative for headaches. Psychiatric/Behavioral: Negative for sleep disturbance. The patient is not nervous/anxious. Physical Exam  Vitals and nursing note reviewed. Constitutional:       General: He is not in acute distress. Appearance: He is well-developed. HENT:      Head: Normocephalic and atraumatic. Right Ear: Hearing, tympanic membrane, ear canal and external ear normal.      Left Ear: Hearing, tympanic membrane, ear canal and external ear normal.      Nose:      Right Sinus: No maxillary sinus tenderness or frontal sinus tenderness. Left Sinus: No maxillary sinus tenderness or frontal sinus tenderness. Mouth/Throat:      Pharynx: No oropharyngeal exudate or posterior oropharyngeal erythema. Eyes:      General: No scleral icterus. Right eye: No discharge. Left eye: No discharge. Extraocular Movements:      Right eye: No nystagmus. Left eye: No nystagmus. Conjunctiva/sclera: Conjunctivae normal.      Pupils: Pupils are equal, round, and reactive to light. Cardiovascular:      Rate and Rhythm: Normal rate and regular rhythm. Heart sounds: Normal heart sounds. Pulmonary:      Effort: Pulmonary effort is normal. No respiratory distress. Breath sounds: Normal breath sounds. No wheezing. Abdominal:      General: Bowel sounds are normal. There is no distension. Palpations: Abdomen is soft. Tenderness: There is no abdominal tenderness. Musculoskeletal:         General: No tenderness. Normal range of motion. Cervical back: Normal range of motion and neck supple. Lymphadenopathy:      Cervical: No cervical adenopathy. Skin:     General: Skin is warm and dry. Findings: No rash. Neurological:      Mental Status: He is alert and oriented to person, place, and time. Motor: No abnormal muscle tone.       Coordination: Coordination normal. Psychiatric:         Behavior: Behavior normal.         Thought Content: Thought content normal.         Judgment: Judgment normal.         Vitals:    06/29/22 1428   BP: (!) 150/76   Pulse: 63   Resp: 20   Temp: 97.7 °F (36.5 °C)   SpO2: 96%              An electronic signature was used to authenticate this note.     --Rivas Barrientos MD

## 2022-07-14 DIAGNOSIS — E03.8 OTHER SPECIFIED HYPOTHYROIDISM: ICD-10-CM

## 2022-07-14 RX ORDER — LEVOTHYROXINE SODIUM 0.03 MG/1
12.5 TABLET ORAL DAILY
Qty: 45 TABLET | Refills: 3 | Status: SHIPPED | OUTPATIENT
Start: 2022-07-14

## 2022-08-25 NOTE — PROGRESS NOTES
Alert. Family at bedside. crackers and ice water given. Side rails up bed in low position.  Call light in reach No

## 2022-10-15 LAB
ALBUMIN SERPL-MCNC: 4 G/DL
ALP BLD-CCNC: 65 U/L
ALT SERPL-CCNC: 13 U/L
ANION GAP SERPL CALCULATED.3IONS-SCNC: NORMAL MMOL/L
AST SERPL-CCNC: 17 U/L
BASOPHILS ABSOLUTE: NORMAL
BASOPHILS RELATIVE PERCENT: 0.5 %
BILIRUB SERPL-MCNC: 0.9 MG/DL (ref 0.1–1.4)
BILIRUBIN DIRECT: 0.2 MG/DL
BUN BLDV-MCNC: 19 MG/DL
CALCIUM SERPL-MCNC: 9.1 MG/DL
CHLORIDE BLD-SCNC: 107 MMOL/L
CHOLESTEROL, TOTAL: 136 MG/DL
CHOLESTEROL/HDL RATIO: 1.6
CO2: 27 MMOL/L
CREAT SERPL-MCNC: 1.1 MG/DL
EOSINOPHILS ABSOLUTE: 0.2 /ΜL
EOSINOPHILS RELATIVE PERCENT: 3.1 %
GFR CALCULATED: 63
GLUCOSE BLD-MCNC: 102 MG/DL
HCT VFR BLD CALC: 42.9 % (ref 41–53)
HDLC SERPL-MCNC: 44 MG/DL (ref 35–70)
HEMOGLOBIN: 14.2 G/DL (ref 13.5–17.5)
LDL CHOLESTEROL CALCULATED: 70 MG/DL (ref 0–160)
LYMPHOCYTES ABSOLUTE: 1.9 /ΜL
LYMPHOCYTES RELATIVE PERCENT: 31.1 %
MCH RBC QN AUTO: 33.7 PG
MCHC RBC AUTO-ENTMCNC: 33.2 G/DL
MCV RBC AUTO: 101.5 FL
MONOCYTES ABSOLUTE: 0.7 /ΜL
MONOCYTES RELATIVE PERCENT: 12 %
NEUTROPHILS ABSOLUTE: 3.3 /ΜL
NEUTROPHILS RELATIVE PERCENT: 53.3 %
NONHDLC SERPL-MCNC: NORMAL MG/DL
PDW BLD-RTO: 12.5 %
PHOSPHORUS: 2.6 MG/DL
PLATELET # BLD: 211 K/ΜL
PMV BLD AUTO: 8.2 FL
POTASSIUM SERPL-SCNC: 4.8 MMOL/L
RBC # BLD: 4.23 10^6/ΜL
SODIUM BLD-SCNC: 142 MMOL/L
TOTAL PROTEIN: 6.5
TRIGL SERPL-MCNC: 108 MG/DL
VLDLC SERPL CALC-MCNC: 22 MG/DL
WBC # BLD: 6.2 10^3/ML

## 2022-11-04 ENCOUNTER — HOSPITAL ENCOUNTER (OUTPATIENT)
Age: 87
Discharge: HOME | End: 2022-11-04
Payer: MEDICARE

## 2022-11-04 DIAGNOSIS — I48.20 CHRONIC ATRIAL FIBRILLATION (HCC): ICD-10-CM

## 2022-11-04 LAB
ANION GAP SERPL CALCULATED.3IONS-SCNC: 11 MEQ/L (ref 8–16)
BUN BLDV-MCNC: 26 MG/DL (ref 7–22)
CALCIUM SERPL-MCNC: 9.3 MG/DL (ref 8.5–10.5)
CHLORIDE BLD-SCNC: 107 MEQ/L (ref 98–111)
CO2: 25 MEQ/L (ref 23–33)
CREAT SERPL-MCNC: 1 MG/DL (ref 0.4–1.2)
ERYTHROCYTE [DISTWIDTH] IN BLOOD BY AUTOMATED COUNT: 11.9 % (ref 11.5–14.5)
ERYTHROCYTE [DISTWIDTH] IN BLOOD BY AUTOMATED COUNT: 46.4 FL (ref 35–45)
GFR SERPL CREATININE-BSD FRML MDRD: > 60 ML/MIN/1.73M2
GLUCOSE BLD-MCNC: 110 MG/DL (ref 70–108)
HCT VFR BLD CALC: 42.9 % (ref 42–52)
HEMOGLOBIN: 13.7 GM/DL (ref 14–18)
MCH RBC QN AUTO: 34.1 PG (ref 26–33)
MCHC RBC AUTO-ENTMCNC: 31.9 GM/DL (ref 32.2–35.5)
MCV RBC AUTO: 106.7 FL (ref 80–94)
PLATELET # BLD: 201 THOU/MM3 (ref 130–400)
PMV BLD AUTO: 10.4 FL (ref 9.4–12.4)
POTASSIUM SERPL-SCNC: 4.7 MEQ/L (ref 3.5–5.2)
RBC # BLD: 4.02 MILL/MM3 (ref 4.7–6.1)
SODIUM BLD-SCNC: 143 MEQ/L (ref 135–145)
WBC # BLD: 5.3 THOU/MM3 (ref 4.8–10.8)

## 2022-11-04 PROCEDURE — 36415 COLL VENOUS BLD VENIPUNCTURE: CPT

## 2022-11-04 PROCEDURE — 85027 COMPLETE CBC AUTOMATED: CPT

## 2022-11-04 PROCEDURE — 80048 BASIC METABOLIC PNL TOTAL CA: CPT

## 2022-11-15 ENCOUNTER — PREP FOR PROCEDURE (OUTPATIENT)
Dept: CARDIOLOGY | Age: 87
End: 2022-11-15

## 2022-11-15 RX ORDER — SODIUM CHLORIDE 9 MG/ML
INJECTION, SOLUTION INTRAVENOUS PRN
Status: CANCELLED | OUTPATIENT
Start: 2022-11-15

## 2022-11-15 RX ORDER — SODIUM CHLORIDE 0.9 % (FLUSH) 0.9 %
5-40 SYRINGE (ML) INJECTION PRN
Status: CANCELLED | OUTPATIENT
Start: 2022-11-15

## 2022-11-15 RX ORDER — SODIUM CHLORIDE 0.9 % (FLUSH) 0.9 %
5-40 SYRINGE (ML) INJECTION EVERY 12 HOURS SCHEDULED
Status: CANCELLED | OUTPATIENT
Start: 2022-11-15

## 2022-11-16 ENCOUNTER — HOSPITAL ENCOUNTER (OUTPATIENT)
Age: 87
Setting detail: OUTPATIENT SURGERY
Discharge: HOME | End: 2022-11-16
Attending: NUCLEAR MEDICINE | Admitting: NUCLEAR MEDICINE
Payer: MEDICARE

## 2022-11-16 VITALS
HEART RATE: 55 BPM | HEIGHT: 70 IN | OXYGEN SATURATION: 94 % | WEIGHT: 173 LBS | DIASTOLIC BLOOD PRESSURE: 50 MMHG | BODY MASS INDEX: 24.77 KG/M2 | SYSTOLIC BLOOD PRESSURE: 103 MMHG | TEMPERATURE: 97.4 F | RESPIRATION RATE: 15 BRPM

## 2022-11-16 DIAGNOSIS — I48.20 CHRONIC ATRIAL FIBRILLATION (HCC): ICD-10-CM

## 2022-11-16 LAB
LV EF: 60 %
LVEF MODALITY: NORMAL

## 2022-11-16 PROCEDURE — 99152 MOD SED SAME PHYS/QHP 5/>YRS: CPT | Performed by: NUCLEAR MEDICINE

## 2022-11-16 PROCEDURE — 93312 ECHO TRANSESOPHAGEAL: CPT

## 2022-11-16 PROCEDURE — 93312 ECHO TRANSESOPHAGEAL: CPT | Performed by: NUCLEAR MEDICINE

## 2022-11-16 PROCEDURE — 7100000011 HC PHASE II RECOVERY - ADDTL 15 MIN: Performed by: NUCLEAR MEDICINE

## 2022-11-16 PROCEDURE — 6360000002 HC RX W HCPCS: Performed by: NUCLEAR MEDICINE

## 2022-11-16 PROCEDURE — 93320 DOPPLER ECHO COMPLETE: CPT

## 2022-11-16 PROCEDURE — 7100000010 HC PHASE II RECOVERY - FIRST 15 MIN: Performed by: NUCLEAR MEDICINE

## 2022-11-16 PROCEDURE — 93325 DOPPLER ECHO COLOR FLOW MAPG: CPT

## 2022-11-16 RX ORDER — FENTANYL CITRATE 50 UG/ML
INJECTION, SOLUTION INTRAMUSCULAR; INTRAVENOUS PRN
Status: DISCONTINUED | OUTPATIENT
Start: 2022-11-16 | End: 2022-11-16 | Stop reason: ALTCHOICE

## 2022-11-16 RX ORDER — SODIUM CHLORIDE 9 MG/ML
INJECTION, SOLUTION INTRAVENOUS PRN
Status: DISCONTINUED | OUTPATIENT
Start: 2022-11-16 | End: 2022-11-16 | Stop reason: HOSPADM

## 2022-11-16 RX ORDER — MIDAZOLAM HYDROCHLORIDE 1 MG/ML
INJECTION INTRAMUSCULAR; INTRAVENOUS PRN
Status: DISCONTINUED | OUTPATIENT
Start: 2022-11-16 | End: 2022-11-16 | Stop reason: ALTCHOICE

## 2022-11-16 RX ORDER — SODIUM CHLORIDE 0.9 % (FLUSH) 0.9 %
5-40 SYRINGE (ML) INJECTION PRN
Status: DISCONTINUED | OUTPATIENT
Start: 2022-11-16 | End: 2022-11-16 | Stop reason: HOSPADM

## 2022-11-16 RX ORDER — SODIUM CHLORIDE 0.9 % (FLUSH) 0.9 %
5-40 SYRINGE (ML) INJECTION EVERY 12 HOURS SCHEDULED
Status: DISCONTINUED | OUTPATIENT
Start: 2022-11-16 | End: 2022-11-16 | Stop reason: HOSPADM

## 2022-11-16 ASSESSMENT — PAIN - FUNCTIONAL ASSESSMENT: PAIN_FUNCTIONAL_ASSESSMENT: NONE - DENIES PAIN

## 2022-11-16 NOTE — H&P
[address] [address]  Sedation/Analgesia History & Physical    Pt Name: Cipriano Og  Account number: [de-identified]  MRN: [id number]  YOB: 1934  Provider Performing Procedure: Alexandra Arevalo MD MD [address] [facility]  Primary Care Physician: Mack Antonio MD  Date: 11/16/2022    PRE-PROCEDURE    Code Status: FULL CODE  Brief History/Pre-Procedure Diagnosis:   Watchman      Consent: : I have discussed with the patient risks, benefits, and alternatives (and relevant risks, benefits, and side effects related to alternatives or not receiving care), and likelihood of the success. The patient and/or representative appear to understand and agree to proceed. The discussion encompasses risks, benefits, and side effects related to the alternatives and the risks related to not receiving the proposed care, treatment, and services. MEDICAL HISTORY  []ASHD/ANGINA/MI/CHF   [x]Hypertension  []Diabetes  []Hyperlipidemia  []Smoking  []Family Hx of ASHD  []Additional information:       has a past medical history of A-fib ([facility] [address] Bronchitis, CAD (coronary artery disease), Hyperlipidemia, Hypertension, Stroke (cerebrum) ([facility] [address] Thyroid disease, and Trigeminal neuralgia. SURGICAL HISTORY   has a past surgical history that includes Appendectomy (1951); Kidney stone surgery (2003); Gallbladder surgery (2010); TURP (2003); brain surgery (2003); Cholecystectomy; Colonoscopy (2013); pr lap,inguinal hernia repr,initial (Bilateral, 8/16/2018); craniotomy (2003); transesophageal echocardiogram (N/A, 1/19/2022); and Cardiac surgery (12/01/2021).   Additional information:       ALLERGIES   Allergies as of 05/23/2022 - Fully Reviewed 04/07/2022   Allergen Reaction Noted    Prednisone  07/25/2018     Additional information:       MEDICATIONS   Aspirin  [x] 81 mg  [] 325 mg  [] None  Antiplatelet drug therapy use last 5 days  []No []Yes  Coumadin Use Last 5 Days []No []Yes  Other anticoagulant use last 5 days  []No angiography/PTA  []Other:      SEDATION  Planned agent:[x]Midazolam []Meperidine [x]Sublimaze []Morphine  []Diazepam  []Other:     ASA Classification:  []1 [x]2 []3 []4 []5  Class 1: A normal healthy patient  Class 2: Pt with mild to moderate systemic disease  Class 3: Severe systemic disease or disturbance  Class 4: Severe systemic disorders that are already life threatening. Class 5: Moribund pt with little chances of survival, for more than 24 hours. Mallampati I Airway Classification:   []1 [x]2 []3 []4    [x]Pre-procedure diagnostic studies complete and results available. Comment:    [x]Previous sedation/anesthesia experiences assessed. Comment:    [x]The patient is an appropriate candidate to undergo the planned procedure sedation and anesthesia. (Refer to nursing sedation/analgesia documentation record)  [x]Formulation and discussion of sedation/procedure plan, risks, and expectations with patient and/or responsible adult completed. [x]Patient examined immediately prior to the procedure.  (Refer to nursing sedation/analgesia documentation record)    Denisa Cornell MD MD [facility]  Electronically signed 11/16/2022 at 2:57 PM

## 2022-12-05 ENCOUNTER — OFFICE VISIT (OUTPATIENT)
Dept: CARDIOLOGY CLINIC | Age: 87
End: 2022-12-05
Payer: MEDICARE

## 2022-12-05 VITALS
DIASTOLIC BLOOD PRESSURE: 78 MMHG | HEIGHT: 70 IN | WEIGHT: 175.8 LBS | HEART RATE: 76 BPM | BODY MASS INDEX: 25.17 KG/M2 | SYSTOLIC BLOOD PRESSURE: 138 MMHG

## 2022-12-05 DIAGNOSIS — Z95.818 PRESENCE OF WATCHMAN LEFT ATRIAL APPENDAGE CLOSURE DEVICE: Primary | ICD-10-CM

## 2022-12-05 DIAGNOSIS — I48.0 PAROXYSMAL ATRIAL FIBRILLATION (HCC): ICD-10-CM

## 2022-12-05 PROCEDURE — 99213 OFFICE O/P EST LOW 20 MIN: CPT | Performed by: NURSE PRACTITIONER

## 2022-12-05 PROCEDURE — G8417 CALC BMI ABV UP PARAM F/U: HCPCS | Performed by: NURSE PRACTITIONER

## 2022-12-05 PROCEDURE — 1123F ACP DISCUSS/DSCN MKR DOCD: CPT | Performed by: NURSE PRACTITIONER

## 2022-12-05 PROCEDURE — G8427 DOCREV CUR MEDS BY ELIG CLIN: HCPCS | Performed by: NURSE PRACTITIONER

## 2022-12-05 PROCEDURE — G8484 FLU IMMUNIZE NO ADMIN: HCPCS | Performed by: NURSE PRACTITIONER

## 2022-12-05 PROCEDURE — 1036F TOBACCO NON-USER: CPT | Performed by: NURSE PRACTITIONER

## 2022-12-05 NOTE — PROGRESS NOTES
[address] [facility] [address] [place] [address]  Dept: [phone]  Dept Fax: [phone]  Loc: [phone]    Visit Date: 12/5/2022    Primary Cardiologist: Dr. Abhay Bolaños    Structural heart: Savannah Reveles MD    Mr. Chichi Tompkins is a 80 y.o. male  who presented for: 1 year post WATCHMAN evaluation  Chief Complaint   Patient presents with    1 Year Follow Up         HPI:   HPI   Last seen in office on 10/28/21 per Dr. Rosa Angeles. Per office note:  79 yo M with non-obstructive CAD, paroxysmal AFib on [place] and [address] who presents for evaluation of WATCHMAN. Has had tremendous epistaxis that took a very long time to stop. Has had issues with ecchymoses and bleeding with minimal trauma. He has had no CVA. His daughter states he has had issues with hematuria in the past.  No falls. He has an ascending aortic aneurysm 4.3 cm. He also has had spontaneous ear bleeding with blood behind the TM in the past.  He has not had any other GI bleeding or hemoptysis. EF 60%, mild AI. He notes that he had left shoulder injection in the past and developed significant hemarthrosis that required admission and management 2/2020. No chest pain, angina, LEDEZMA, orthopnea, PND, sob at rest, palpitations, LE edema, or syncope. No hx of CVA.       Assessment/Plan   Paroxysmal Atrial Fibrillation, [id number] = 4      Current Outpatient Medications:     levothyroxine (SYNTHROID) 25 MCG tablet, Take 0.5 tablets by mouth Daily, Disp: 45 tablet, Rfl: 3    valsartan (DIOVAN) 160 MG tablet, Take 1 tablet by mouth daily, Disp: 90 tablet, Rfl: 3    atorvastatin (LIPITOR) 10 MG tablet, TAKE ONE TABLET BY MOUTH EVERY DAY, Disp: 90 tablet, Rfl: 3    Cholecalciferol (VITAMIN D) 125 MCG (5000 UT) CAPS, Take by mouth, Disp: , Rfl:     vitamin C (ASCORBIC ACID) 500 MG tablet, Take 500 mg by mouth daily, Disp: , Rfl:     flecainide (TAMBOCOR) 100 MG tablet, Take 1 tablet by mouth 2 times daily TAKE ONE TABLET BY MOUTH TWICE A DAY, Disp: 180 tablet, Rfl: 3    aspirin 81 MG chewable tablet, Take 1 tablet by mouth daily, Disp: 30 tablet, Rfl: 3    MULTIPLE VITAMIN PO, Take 1 tablet by mouth daily , Disp: , Rfl:     Past Medical History  Alessandro Shanks  has a past medical history of A-fib ([facility] [address] Bronchitis, CAD (coronary artery disease), Hyperlipidemia, Hypertension, Stroke (cerebrum) ([facility] [address] Thyroid disease, and Trigeminal neuralgia. Social History  Alessandro Shanks  reports that he has never smoked. He has never used smokeless tobacco. He reports that he does not drink alcohol and does not use drugs. Family History  Alessandro Shanks family history includes Cancer in his brother; Diabetes in his brother, brother, and father; Heart Attack in his mother; Heart Disease in his brother, father, and mother; High Blood Pressure in his father; Stroke in his brother. There is no family history of bicuspid aortic valve, aneurysms, heart transplant, pacemakers, defibrillators, or sudden cardiac death.       Past Surgical History   Past Surgical History:   Procedure Laterality Date    [address] SURGERY  2003    CARDIAC SURGERY  12/01/2021    Watchman    CHOLECYSTECTOMY      COLONOSCOPY  2013    CRANIOTOMY  2003    GALLBLADDER SURGERY  2010    KIDNEY STONE SURGERY  2003    SD LAP,INGUINAL HERNIA REPR,INITIAL Bilateral 8/16/2018    robotic bilateral inguinal hernia repair with mesh [facility]    TRANSESOPHAGEAL ECHOCARDIOGRAM N/A 1/19/2022    TRANSESOPHAGEAL ECHOCARDIOGRAM performed by Scooter Valdovinos MD at [facility]    TRANSESOPHAGEAL ECHOCARDIOGRAM N/A 11/16/2022    TRANSESOPHAGEAL ECHOCARDIOGRAM performed by Scooter Valdovinos MD at [facility]    TUR  2003     Today's visit:   Subjective:  No stroke sx  No chest discomfort  Breathing without issues  Occasional mild dizziness if moves too fast; not always  No syncope or near syncope   No palpitations  No swelling issues  No claudication      Review of Systems   Constitutional: Negative for chills and fever  HENT: Negative for congestion, sinus pressure, sneezing and sore throat. Eyes: Negative for pain, discharge, redness and itching. Respiratory: Negative for PND, orthopnea, cough  Gastrointestinal: Negative for blood in stool, constipation, diarrhea   Endocrine: Negative for cold intolerance, heat intolerance, polydipsia. Genitourinary: Negative for dysuria,  hematuria. Musculoskeletal: Negative for arthralgias, joint swelling and neck pain. Neurological: Negative for numbness and headaches. Psychiatric/Behavioral: Negative for agitation, confusion, decreased concentration and dysphoric mood. Objective:     /78   Pulse 76   Ht 5' 10\" (1.778 m)   Wt 175 lb 12.8 oz (79.7 kg)   BMI 25.22 kg/m²     Wt Readings from Last 3 Encounters:   12/05/22 175 lb 12.8 oz (79.7 kg)   11/16/22 173 lb (78.5 kg)   06/29/22 163 lb (73.9 kg)     BP Readings from Last 3 Encounters:   12/05/22 138/78   11/16/22 (!) 103/50   06/29/22 (!) 150/76       Nursing note and vitals reviewed. Physical Exam  Constitutional: Oriented to person, place, and time. Appears well-developed and well-nourished. HENT:   Head: Normocephalic and atraumatic. Eyes: EOM are normal. Pupils are equal, round, and reactive to light. Neck: Normal range of motion. Neck supple. No JVD present. Cardiovascular: Normal rate, regular rhythm, normal heart sounds and intact distal pulses. No murmur heard. Pulmonary/Chest: Effort normal and breath sounds normal. No respiratory distress. No wheezes. No rales. Abdominal: Soft. Bowel sounds are normal. No distension. There is no tenderness. Musculoskeletal: Normal range of motion. No edema. Neurological: Alert and oriented to person, place, and time. No cranial nerve deficit. Coordination normal.   Skin: Skin is warm and dry. Psychiatric: Normal mood and affect.        No results found for: CKTOTAL, CKMB, CKMBINDEX    Lab Results   Component Value Date/Time    WBC 5.3 2022 12:15 PM    RBC 4.02 2022 12:15 PM    HGB 13.7 2022 12:15 PM    HCT 42.9 2022 12:15 PM    .7 2022 12:15 PM    MCH 34.1 2022 12:15 PM    MCHC 31.9 2022 12:15 PM    RDW 12.5 10/15/2022 12:00 AM     2022 12:15 PM    MPV 10.4 2022 12:15 PM       Lab Results   Component Value Date/Time     2022 12:15 PM    K 4.7 2022 12:15 PM    K 4.6 2019 05:35 AM     2022 12:15 PM    CO2 25 2022 12:15 PM    BUN 26 2022 12:15 PM    LABALBU 4.0 10/15/2022 12:00 AM    CREATININE 1.0 2022 12:15 PM    CALCIUM 9.3 2022 12:15 PM    LABGLOM >60 2022 12:13 PM    GLUCOSE 110 2022 12:15 PM    GLUCOSE 98 2018 08:08 AM       Lab Results   Component Value Date/Time    ALKPHOS 65 10/15/2022 12:00 AM    ALT 13 10/15/2022 12:00 AM    AST 17 10/15/2022 12:00 AM    PROT 6.8 2020 07:35 PM    BILITOT 0.9 10/15/2022 12:00 AM    BILIDIR 0.2 10/15/2022 12:00 AM    LABALBU 4.0 10/15/2022 12:00 AM       Lab Results   Component Value Date/Time    MG 2.1 2019 05:35 AM       Lab Results   Component Value Date    INR 1.00 2021    INR 1.46 (H) 2020    INR 1.42 (H) 2019         No results found for: LABA1C    Lab Results   Component Value Date/Time    TRIG 108 10/15/2022 12:00 AM    HDL 44 10/15/2022 12:00 AM    LDLCALC 70 10/15/2022 12:00 AM       Lab Results   Component Value Date/Time    TSH 4.030 2021 10:04 AM         Testing Reviewed:      I have individually reviewed the cardiac test below:    Ek/1/21  01-DEC-2021 06:06:31 [facility] ROUTINE RECORD  Sinus rhythm with 1st degree A-V block with Premature atrial complexes in a pattern of bigeminy  Otherwise normal ECG  When compared with ECG of 2019 12:56,  No significant change was found  Confirmed by Deborah Cummins MD, Rylie Madden ([id number]) on 12/2/2021 12:42:16 AM      ANNALEE: 11/16/2022  Indications:1 year post Watchman. Additional Medical History:Coronary artery disease, COVID-19, stroke, atrial  fibrillation, hyperlipidemia, hypertension    Summary   Ejection fraction is visually estimated at 60%. Overall left ventricular function is normal.   Normal size left atrium. LAAO device in good position   No thrombi  minimal posterior leak, less than 0.1 cm difficult to visualize the base   very small PFO   Aortic valve appears tricuspid. Mild aortic regurgitation is noted. Signature    ----------------------------------------------------------------   Electronically signed by Tavares Garcia MD (Interpreting   physician) on 11/16/2022 at 03:45 PM      ANNALEE: 1/19/22 - 45 DAY POST WATCHMAN  Indications:Post Watchmen device. Additional Medical History:Atrial fibrillation, coronary artery disease,  stroke, thyroid disease, hypertension    Summary   Ejection fraction is visually estimated at 65%. Overall left ventricular function is normal.   Normal size left atrium. LAAO device in good position   No thrombi and NO leaks   Aortic valve appears tricuspid. Mild-to-moderate aortic regurgitation is noted. Dilation of aortic root . Dilation of ascending aorta . 4.2 Cm    Signature    Electronically signed by Tavares Garcia MD (Interpreting   physician) on 01/19/2022 at 03:53 PM    ECHO: 07/16/20  Indications:Coronary artery disease. Additional Medical History:stroke, atrial fibrillation, dizziness, thoracic  aortic aneurysm, hypothyroid, hypertension, hyperlipidemia  Summary  Ejection fraction is visually estimated at 60%. Overall left ventricular function is normal.  The aortic valve leaflets were not well visualized. Aortic valve appears tricuspid. Mild aortic regurgitation is noted.   Signature  Electronically signed by Tavares Garcia MD (Interpreting  physician) on 07/16/2020 at 03:54 PM    12/1/2021: [address] [facility]  Sedation/Analgesia Post Sedation Record     Pt Name: Jasbir Briones  Account number: [de-identified]  MRN: [id number]  YOB: 1934  Procedure Performed By: MD MD Hernan Bird, [address]  Primary Care Physician: Stacey Cohn MD  Date: 12/1/2021     POST-PROCEDURE     Physicians/Assistants: MD MD Hernan Bird, DESI     Procedure Performed:WATCHMAN                   Sedation/Anesthesia: Versed/ Fentanyl and 2% xylocaine local anesthesia. Estimated Blood Loss: < 50 ml. Specimens Removed: None                                           Disposition of Specimen: N/A                                         Complications: No Immediate Complications. Post-procedure Diagnosis/Findings:           WATCHMAN FLX 27 mm                                                   MD MD Hernan Bird, RPVI  Electronically signed 12/1/2021 at 8:19 AM  Interventional Cardiology    Assessment/Plan   Paroxysmal Atrial Fibrillation, [id number] = 4  1 year post WATCHMAN follow-up    Doing well. Feeling well. No stroke symptoms. 1 year post watchman ANNALEE notes device in good position with very minimal posterior leak 0.1. No thrombi. EF 60%. Very small PFO. Patient asymptomatic. No anginal symptoms, no dyspnea or symptoms of decompensated heart failure. No palpitations, no lightheadedness or dizziness or near syncope. No stroke symptoms. Tolerating medications. Active. Continue current medications as prescribed. Stay as active as you can. Eat heart healthy diet. Stay well hydrated. Follow-up with your PCP as scheduled. Follow-up with Dr. Pilra Esparza on 3/13/2023 as scheduled or sooner if need. Follow-up with Dr. Rosendo Garcia on as needed basis.     Electronically signed by HOWIE Pandey CNP   1/4/2023 at 12:32 PM EDT

## 2022-12-05 NOTE — PATIENT INSTRUCTIONS
Continue current medications as prescribed. Stay as active as you can. Eat heart healthy diet. Stay well hydrated. Follow-up with your PCP as scheduled. Follow-up with Dr. Jasmin Moran on 3/13/2023 as scheduled or sooner if need. Follow-up with Dr. Adolfo Frazier on as needed basis.

## 2023-01-05 ENCOUNTER — OFFICE VISIT (OUTPATIENT)
Dept: FAMILY MEDICINE CLINIC | Age: 88
End: 2023-01-05

## 2023-01-05 VITALS
WEIGHT: 176 LBS | HEART RATE: 68 BPM | OXYGEN SATURATION: 92 % | SYSTOLIC BLOOD PRESSURE: 120 MMHG | DIASTOLIC BLOOD PRESSURE: 78 MMHG | BODY MASS INDEX: 25.25 KG/M2 | RESPIRATION RATE: 18 BRPM

## 2023-01-05 DIAGNOSIS — J40 BRONCHITIS: Primary | ICD-10-CM

## 2023-01-05 RX ORDER — DOXYCYCLINE HYCLATE 100 MG
100 TABLET ORAL 2 TIMES DAILY
Qty: 20 TABLET | Refills: 0 | Status: SHIPPED | OUTPATIENT
Start: 2023-01-05 | End: 2023-01-15

## 2023-01-05 SDOH — ECONOMIC STABILITY: FOOD INSECURITY: WITHIN THE PAST 12 MONTHS, THE FOOD YOU BOUGHT JUST DIDN'T LAST AND YOU DIDN'T HAVE MONEY TO GET MORE.: NEVER TRUE

## 2023-01-05 SDOH — ECONOMIC STABILITY: FOOD INSECURITY: WITHIN THE PAST 12 MONTHS, YOU WORRIED THAT YOUR FOOD WOULD RUN OUT BEFORE YOU GOT MONEY TO BUY MORE.: NEVER TRUE

## 2023-01-05 ASSESSMENT — ENCOUNTER SYMPTOMS
CHEST TIGHTNESS: 0
ABDOMINAL DISTENTION: 0
VOMITING: 0
NAUSEA: 0
SHORTNESS OF BREATH: 0
BACK PAIN: 0
WHEEZING: 0
COLOR CHANGE: 0
CONSTIPATION: 0
COUGH: 1
SORE THROAT: 0
STRIDOR: 0
SINUS PRESSURE: 0
DIARRHEA: 0
ABDOMINAL PAIN: 0

## 2023-01-05 ASSESSMENT — SOCIAL DETERMINANTS OF HEALTH (SDOH): HOW HARD IS IT FOR YOU TO PAY FOR THE VERY BASICS LIKE FOOD, HOUSING, MEDICAL CARE, AND HEATING?: NOT HARD AT ALL

## 2023-01-05 NOTE — PROGRESS NOTES
Dee Mart (:  1934) is a 80 y.o. male,Established patient, here for evaluation of the following chief complaint(s):  Cough (Gets winded when coughing)         ASSESSMENT/PLAN:  1. Bronchitis    Fluids  Rest  Doxycycline  Mucinex DM  humidifier    Return for AWV with DR. Porter. Subjective   SUBJECTIVE/OBJECTIVE:  HPI    Cough. Ongoing for 4 days now. No SOB. Not taking anything. Denies fever, congestion, diarrhea, sob. Doing good otherwise. No recent exposures. Review of Systems   Constitutional:  Negative for activity change, appetite change, chills, diaphoresis, fatigue, fever and unexpected weight change. HENT:  Negative for congestion, ear pain, postnasal drip, sinus pressure and sore throat. Eyes:  Negative for visual disturbance. Respiratory:  Positive for cough. Negative for chest tightness, shortness of breath, wheezing and stridor. Cardiovascular:  Negative for chest pain, palpitations and leg swelling. Gastrointestinal:  Negative for abdominal distention, abdominal pain, constipation, diarrhea, nausea and vomiting. Endocrine: Negative for polydipsia, polyphagia and polyuria. Genitourinary:  Negative for decreased urine volume, difficulty urinating, dysuria, flank pain, frequency, hematuria and urgency. Musculoskeletal:  Negative for arthralgias, back pain, gait problem, joint swelling, myalgias and neck pain. Skin:  Negative for color change, pallor and rash. Neurological:  Negative for dizziness, syncope, weakness, light-headedness, numbness and headaches. Hematological:  Negative for adenopathy. Psychiatric/Behavioral:  Negative for behavioral problems, self-injury and sleep disturbance. The patient is not nervous/anxious. Objective   Physical Exam  Vitals reviewed. Constitutional:       General: He is not in acute distress. Appearance: Normal appearance. He is well-developed. HENT:      Head: Normocephalic.       Right Ear: External ear normal.      Left Ear: External ear normal.      Nose: Nose normal.      Right Sinus: No maxillary sinus tenderness. Left Sinus: No maxillary sinus tenderness. Mouth/Throat:      Pharynx: Uvula midline. Neck:      Trachea: Trachea normal.   Cardiovascular:      Rate and Rhythm: Normal rate and regular rhythm. Heart sounds: Normal heart sounds. No murmur heard. Pulmonary:      Effort: Pulmonary effort is normal. No respiratory distress. Breath sounds: Normal breath sounds. No decreased breath sounds, wheezing, rhonchi or rales. Chest:      Chest wall: No tenderness. Abdominal:      General: There is no distension. Palpations: Abdomen is soft. There is no mass. Tenderness: There is no abdominal tenderness. Musculoskeletal:         General: No tenderness or deformity. Normal range of motion. Cervical back: Normal range of motion and neck supple. Lymphadenopathy:      Cervical: No cervical adenopathy. Skin:     General: Skin is warm and dry. Neurological:      Mental Status: He is alert and oriented to person, place, and time. Motor: No abnormal muscle tone. Coordination: Coordination normal.      Gait: Gait normal.   Psychiatric:         Mood and Affect: Mood normal.         Behavior: Behavior normal.         Thought Content: Thought content normal.         Judgment: Judgment normal.          On this date 1/5/2023 I have spent 24 minutes reviewing previous notes, test results and face to face with the patient discussing the diagnosis and importance of compliance with the treatment plan as well as documenting on the day of the visit. An electronic signature was used to authenticate this note.     --HOWIE Perez - CNP

## 2023-01-26 RX ORDER — FLECAINIDE ACETATE 100 MG/1
100 TABLET ORAL 2 TIMES DAILY
Qty: 180 TABLET | Refills: 3 | Status: SHIPPED | OUTPATIENT
Start: 2023-01-26

## 2023-03-13 ENCOUNTER — OFFICE VISIT (OUTPATIENT)
Dept: CARDIOLOGY CLINIC | Age: 88
End: 2023-03-13
Payer: MEDICARE

## 2023-03-13 VITALS
HEART RATE: 55 BPM | HEIGHT: 70 IN | DIASTOLIC BLOOD PRESSURE: 78 MMHG | WEIGHT: 173 LBS | SYSTOLIC BLOOD PRESSURE: 158 MMHG | BODY MASS INDEX: 24.77 KG/M2

## 2023-03-13 DIAGNOSIS — I48.0 PAROXYSMAL ATRIAL FIBRILLATION (HCC): Primary | ICD-10-CM

## 2023-03-13 PROCEDURE — 1036F TOBACCO NON-USER: CPT | Performed by: NUCLEAR MEDICINE

## 2023-03-13 PROCEDURE — G8482 FLU IMMUNIZE ORDER/ADMIN: HCPCS | Performed by: NUCLEAR MEDICINE

## 2023-03-13 PROCEDURE — 99213 OFFICE O/P EST LOW 20 MIN: CPT | Performed by: NUCLEAR MEDICINE

## 2023-03-13 PROCEDURE — 1123F ACP DISCUSS/DSCN MKR DOCD: CPT | Performed by: NUCLEAR MEDICINE

## 2023-03-13 PROCEDURE — 93000 ELECTROCARDIOGRAM COMPLETE: CPT | Performed by: NUCLEAR MEDICINE

## 2023-03-13 PROCEDURE — G8427 DOCREV CUR MEDS BY ELIG CLIN: HCPCS | Performed by: NUCLEAR MEDICINE

## 2023-03-13 PROCEDURE — G8420 CALC BMI NORM PARAMETERS: HCPCS | Performed by: NUCLEAR MEDICINE

## 2023-03-13 NOTE — PROGRESS NOTES
[address] [facility]  [address]  [address]  [facility] [address]  Dept: [phone]  Dept Fax: [phone]  Loc: [phone]    Visit Date: 3/13/2023    Lore Shanks is a 80 y.o. male who presents todayfor:  Chief Complaint   Patient presents with    Atrial Fibrillation   Had a watchman  No chest pain   No changes in breathing  BP is stable   No dizziness  No syncope        HPI:  HPI  Past Medical History:   Diagnosis Date    A-fib ([address]     Bronchitis     CAD (coronary artery disease)     atrial fib     Hyperlipidemia     Hypertension     Stroke (cerebrum) ([address]     Thyroid disease     Trigeminal neuralgia       Past Surgical History:   Procedure Laterality Date    [address] 2003    CARDIAC SURGERY  12/01/2021    Watchman    CHOLECYSTECTOMY      COLONOSCOPY  2013    CRANIOTOMY  2003    GALLBLADDER SURGERY  2010    KIDNEY STONE SURGERY  2003    IA LAPAROSCOPY SURG RPR INITIAL INGUINAL HERNIA Bilateral 8/16/2018    robotic bilateral inguinal hernia repair with mesh performed by Trinidad León MD at [place] [facility]    TRANSESOPHAGEAL ECHOCARDIOGRAM N/A 1/19/2022    TRANSESOPHAGEAL ECHOCARDIOGRAM performed by Lynne Leal MD at [facility]    TRANSESOPHAGEAL ECHOCARDIOGRAM N/A 11/16/2022    TRANSESOPHAGEAL ECHOCARDIOGRAM performed by Lynne Leal MD at [facility]    [facility]  2003     Family History   Problem Relation Age of Onset    Heart Disease Mother     Heart Attack Mother     Diabetes Father     Heart Disease Father     High Blood Pressure Father     Cancer Brother     Diabetes Brother     Heart Disease Brother     Diabetes Brother     Stroke Brother      Social History     Tobacco Use    Smoking status: Never    Smokeless tobacco: Never   Substance Use Topics    Alcohol use: No      Current Outpatient Medications   Medication Sig Dispense Refill    flecainide (TAMBOCOR) 100 MG tablet Take 1 tablet by mouth 2 times daily TAKE ONE TABLET BY MOUTH TWICE A  tablet 3    levothyroxine (SYNTHROID) 25 MCG tablet Take 0.5 tablets by mouth Daily 45 tablet 3    valsartan (DIOVAN) 160 MG tablet Take 1 tablet by mouth daily 90 tablet 3    atorvastatin (LIPITOR) 10 MG tablet TAKE ONE TABLET BY MOUTH EVERY DAY 90 tablet 3    Cholecalciferol (VITAMIN D) 125 MCG (5000 UT) CAPS Take by mouth      vitamin C (ASCORBIC ACID) 500 MG tablet Take 500 mg by mouth daily      aspirin 81 MG chewable tablet Take 1 tablet by mouth daily 30 tablet 3    MULTIPLE VITAMIN PO Take 1 tablet by mouth daily        No current facility-administered medications for this visit. Allergies   Allergen Reactions    Prednisone      Raised BP     Health Maintenance   Topic Date Due    COVID-19 Vaccine (4 - Booster for Moderna series) 12/20/2021    Annual Wellness Visit (AWV)  09/01/2022    DTaP/Tdap/Td vaccine (1 - Tdap) 04/07/2023 (Originally 2/5/1953)    Depression Screen  06/23/2023    Lipids  10/15/2023    Flu vaccine  Completed    Shingles vaccine  Completed    Pneumococcal 65+ years Vaccine  Completed    Hepatitis A vaccine  Aged Out    Hib vaccine  Aged Out    Meningococcal (ACWY) vaccine  Aged Out       Subjective:  General:   No fever, no chills, No fatigue or weight loss  Pulmonary:    No dyspnea, no wheezing  Cardiac:    Denies recent chest pain,   GI:     No nausea or vomiting, no abdominal pain  Neuro:    No dizziness or light headedness,   Musculoskeletal:  No recent active issues  Extremities:   No edema, no obvious claudication       Objective:  General:   Well developed, well nourished  Lungs:   Clear to auscultation  Heart:    Normal S1 S2, Slight murmur. no rubs, no gallops  Abdomen:   Soft, non tender, no organomegalies, positive bowel sounds  Extremities:   No edema, no cyanosis, good peripheral pulses  Neurological:   Awake, alert, oriented.  No obvious focal deficits  Musculoskelatal:  No obvious deformities   BP (!) 158/78   Pulse 55   Ht 5' 10\" (1.778 m)   Wt 173 lb (78.5 kg)   BMI 24.82 kg/m²     Assessment:      Diagnosis Orders   1. Paroxysmal atrial fibrillation (HCC)  EKG 12 Lead      As above  Cardiac fair for no w  ECG in office was done today. I reviewed the ECG. No acute findings    Plan:  No follow-ups on file. As above  Continue risk factor modification and medical management  Thank you for allowing me to participate in the care of your patient. Please don't hesitate to contact me regarding any further issues related to the patient care    Orders Placed:  Orders Placed This Encounter   Procedures    EKG 12 Lead     Order Specific Question:   Reason for Exam?     Answer: Other       Prescribed:  No orders of the defined types were placed in this encounter. Discussed use, benefit, and side effects of prescribed medications. All patient questions answered. Pt voicedunderstanding. Instructed to continue current medications, diet and exercise. Continue risk factor modification and medical management. Patient agreed with treatment plan. Follow up as directed.     Electronically signedby Carolyn Kaiser MD on 3/13/2023 at 11:58 AM

## 2023-04-21 RX ORDER — VALSARTAN 160 MG/1
160 TABLET ORAL DAILY
Qty: 90 TABLET | Refills: 3 | Status: SHIPPED | OUTPATIENT
Start: 2023-04-21

## 2023-04-21 RX ORDER — ATORVASTATIN CALCIUM 10 MG/1
TABLET, FILM COATED ORAL
Qty: 90 TABLET | Refills: 3 | Status: SHIPPED | OUTPATIENT
Start: 2023-04-21

## 2023-05-06 ENCOUNTER — HOSPITAL ENCOUNTER (EMERGENCY)
Age: 88
Discharge: HOME | End: 2023-05-06
Attending: EMERGENCY MEDICINE
Payer: MEDICARE

## 2023-05-06 ENCOUNTER — APPOINTMENT (OUTPATIENT)
Dept: GENERAL RADIOLOGY | Age: 88
End: 2023-05-06
Payer: MEDICARE

## 2023-05-06 VITALS
OXYGEN SATURATION: 99 % | HEIGHT: 67 IN | TEMPERATURE: 98.2 F | HEART RATE: 90 BPM | RESPIRATION RATE: 18 BRPM | BODY MASS INDEX: 26.68 KG/M2 | SYSTOLIC BLOOD PRESSURE: 140 MMHG | WEIGHT: 170 LBS | DIASTOLIC BLOOD PRESSURE: 64 MMHG

## 2023-05-06 DIAGNOSIS — S20.212A CHEST WALL CONTUSION, LEFT, INITIAL ENCOUNTER: ICD-10-CM

## 2023-05-06 DIAGNOSIS — S51.012A SKIN TEAR OF LEFT ELBOW WITHOUT COMPLICATION, INITIAL ENCOUNTER: ICD-10-CM
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
## 2023-05-06 PROCEDURE — 71101 X-RAY EXAM UNILAT RIBS/CHEST: CPT

## 2023-05-06 PROCEDURE — 6370000000 HC RX 637 (ALT 250 FOR IP): Performed by: EMERGENCY MEDICINE

## 2023-05-06 PROCEDURE — 99283 EMERGENCY DEPT VISIT LOW MDM: CPT

## 2023-05-06 RX ORDER — IBUPROFEN 200 MG
600 TABLET ORAL ONCE
Status: COMPLETED | OUTPATIENT
Start: 2023-05-06 | End: 2023-05-06

## 2023-05-06 RX ORDER — HYDROCODONE BITARTRATE AND ACETAMINOPHEN 5; 325 MG/1; MG/1
1 TABLET ORAL EVERY 6 HOURS PRN
Qty: 8 TABLET | Refills: 0 | Status: SHIPPED | OUTPATIENT
Start: 2023-05-06 | End: 2023-05-08

## 2023-05-06 RX ORDER — HYDROCODONE BITARTRATE AND ACETAMINOPHEN 5; 325 MG/1; MG/1
1 TABLET ORAL ONCE
Status: COMPLETED | OUTPATIENT
Start: 2023-05-06 | End: 2023-05-06

## 2023-05-06 RX ADMIN — HYDROCODONE BITARTRATE AND ACETAMINOPHEN 1 TABLET: 5; 325 TABLET ORAL at 21:11

## 2023-05-06 RX ADMIN — IBUPROFEN 600 MG: 200 TABLET, FILM COATED ORAL at 21:11

## 2023-05-06 ASSESSMENT — PAIN DESCRIPTION - DESCRIPTORS
DESCRIPTORS: ACHING
DESCRIPTORS: ACHING

## 2023-05-06 ASSESSMENT — PAIN DESCRIPTION - ORIENTATION
ORIENTATION: LEFT
ORIENTATION: LEFT

## 2023-05-06 ASSESSMENT — ENCOUNTER SYMPTOMS
ABDOMINAL PAIN: 0
NAUSEA: 0
WHEEZING: 0
COUGH: 0
BACK PAIN: 0

## 2023-05-06 ASSESSMENT — PAIN SCALES - GENERAL: PAINLEVEL_OUTOF10: 9

## 2023-05-06 ASSESSMENT — PAIN DESCRIPTION - LOCATION
LOCATION: RIB CAGE
LOCATION: RIB CAGE

## 2023-05-06 ASSESSMENT — PAIN - FUNCTIONAL ASSESSMENT: PAIN_FUNCTIONAL_ASSESSMENT: 0-10

## 2023-05-11 ENCOUNTER — OFFICE VISIT (OUTPATIENT)
Dept: FAMILY MEDICINE CLINIC | Age: 88
End: 2023-05-11
Payer: MEDICARE

## 2023-05-11 VITALS
DIASTOLIC BLOOD PRESSURE: 78 MMHG | SYSTOLIC BLOOD PRESSURE: 136 MMHG | WEIGHT: 170 LBS | RESPIRATION RATE: 20 BRPM | BODY MASS INDEX: 26.63 KG/M2 | OXYGEN SATURATION: 95 % | HEART RATE: 86 BPM

## 2023-05-11 DIAGNOSIS — R07.81 RIB PAIN ON LEFT SIDE: Primary | ICD-10-CM

## 2023-05-11 DIAGNOSIS — Z91.81 AT HIGH RISK FOR FALLS: ICD-10-CM

## 2023-05-11 PROCEDURE — 1036F TOBACCO NON-USER: CPT | Performed by: FAMILY MEDICINE

## 2023-05-11 PROCEDURE — 99213 OFFICE O/P EST LOW 20 MIN: CPT | Performed by: FAMILY MEDICINE

## 2023-05-11 PROCEDURE — G8417 CALC BMI ABV UP PARAM F/U: HCPCS | Performed by: FAMILY MEDICINE

## 2023-05-11 PROCEDURE — G8427 DOCREV CUR MEDS BY ELIG CLIN: HCPCS | Performed by: FAMILY MEDICINE

## 2023-05-11 PROCEDURE — 1123F ACP DISCUSS/DSCN MKR DOCD: CPT | Performed by: FAMILY MEDICINE

## 2023-05-11 RX ORDER — IBUPROFEN 600 MG/1
600 TABLET ORAL 3 TIMES DAILY PRN
Qty: 30 TABLET | Refills: 1 | Status: SHIPPED | OUTPATIENT
Start: 2023-05-11 | End: 2023-05-21

## 2023-05-11 RX ORDER — HYDROCODONE BITARTRATE AND ACETAMINOPHEN 5; 325 MG/1; MG/1
1 TABLET ORAL EVERY 8 HOURS PRN
Qty: 21 TABLET | Refills: 0 | Status: SHIPPED | OUTPATIENT
Start: 2023-05-11 | End: 2023-05-18

## 2023-05-11 RX ORDER — HYDROCODONE BITARTRATE AND ACETAMINOPHEN 5; 325 MG/1; MG/1
TABLET ORAL
COMMUNITY
Start: 2023-05-08 | End: 2023-05-11 | Stop reason: SDUPTHER

## 2023-05-11 SDOH — ECONOMIC STABILITY: INCOME INSECURITY: HOW HARD IS IT FOR YOU TO PAY FOR THE VERY BASICS LIKE FOOD, HOUSING, MEDICAL CARE, AND HEATING?: PATIENT DECLINED

## 2023-05-11 SDOH — ECONOMIC STABILITY: HOUSING INSECURITY
IN THE LAST 12 MONTHS, WAS THERE A TIME WHEN YOU DID NOT HAVE A STEADY PLACE TO SLEEP OR SLEPT IN A SHELTER (INCLUDING NOW)?: PATIENT REFUSED

## 2023-05-11 SDOH — ECONOMIC STABILITY: FOOD INSECURITY: WITHIN THE PAST 12 MONTHS, YOU WORRIED THAT YOUR FOOD WOULD RUN OUT BEFORE YOU GOT MONEY TO BUY MORE.: PATIENT DECLINED

## 2023-05-11 SDOH — ECONOMIC STABILITY: FOOD INSECURITY: WITHIN THE PAST 12 MONTHS, THE FOOD YOU BOUGHT JUST DIDN'T LAST AND YOU DIDN'T HAVE MONEY TO GET MORE.: PATIENT DECLINED

## 2023-05-11 ASSESSMENT — ENCOUNTER SYMPTOMS
NAUSEA: 0
BACK PAIN: 0
ABDOMINAL PAIN: 0
ORTHOPNEA: 0
WHEEZING: 0
CONSTIPATION: 1
SHORTNESS OF BREATH: 0
RHINORRHEA: 0
COUGH: 0
SORE THROAT: 0
DIARRHEA: 0
VOMITING: 0

## 2023-05-11 ASSESSMENT — PATIENT HEALTH QUESTIONNAIRE - PHQ9
1. LITTLE INTEREST OR PLEASURE IN DOING THINGS: 0
SUM OF ALL RESPONSES TO PHQ9 QUESTIONS 1 & 2: 0
SUM OF ALL RESPONSES TO PHQ QUESTIONS 1-9: 0
2. FEELING DOWN, DEPRESSED OR HOPELESS: 0
SUM OF ALL RESPONSES TO PHQ QUESTIONS 1-9: 0

## 2023-05-11 NOTE — PROGRESS NOTES
[address] [address] [address] [place] [address]  Dept: [phone]  Loc: [address] (:  1934) is a 80 y.o. male, here for evaluation of the following chief complaint(s):  ED Follow-up (Fall on Saturday seen at the ED left sided rib pain )      ASSESSMENT/PLAN:  1. Rib pain on left side  -     ibuprofen (ADVIL;MOTRIN) 600 MG tablet; Take 1 tablet by mouth 3 times daily as needed for Pain With food, Disp-30 tablet, R-1Normal  -     HYDROcodone-acetaminophen (NORCO) 5-325 MG per tablet; Take 1 tablet by mouth every 8 hours as needed for Pain for up to 7 days. Max Daily Amount: 3 tablets, Disp-21 tablet, R-0Normal  2. At high risk for falls  Will refill norco, use sparingly. Intermittent tylenol/ibuprofen  Lidocaine patches  Use incentive spirometer  If not improved somewhat by Monday, return to clinic for further eval      No follow-ups on file. SUBJECTIVE/OBJECTIVE:  Pt fell out of bed. Went to ER. CXR negative for acute issue. Was doing ok, but then got on riding  yesterday which exacerbated pain. Worse when taking deep breath or palpation. Has taken norco, but has had some constipation. Also lidocaine patch, not helping much. Denies SOA. Chest Pain   This is a new problem. The current episode started in the past 7 days. The onset quality is sudden. The problem occurs constantly. The problem has been unchanged. The pain is present in the lateral region (left lateral rib). The pain is severe. The quality of the pain is described as sharp. The pain does not radiate. Pertinent negatives include no abdominal pain, back pain, claudication, cough, diaphoresis, dizziness, exertional chest pressure, fever, headaches, irregular heartbeat, malaise/fatigue, nausea, near-syncope, orthopnea, palpitations, shortness of breath, syncope or vomiting.  Past medical history comments: recent fall Prior

## 2023-07-10 DIAGNOSIS — E03.8 OTHER SPECIFIED HYPOTHYROIDISM: ICD-10-CM

## 2023-07-10 RX ORDER — LEVOTHYROXINE SODIUM 0.03 MG/1
12.5 TABLET ORAL DAILY
Qty: 45 TABLET | Refills: 3 | Status: SHIPPED | OUTPATIENT
Start: 2023-07-10

## 2023-07-10 NOTE — TELEPHONE ENCOUNTER
Last appointment this department: 5/11/2023  Next appointment this department: Visit date not found    Patient stopped in today requesting a refill on his Levothyroxine to go to [facility] pharmacy.

## 2023-09-19 ENCOUNTER — TELEPHONE (OUTPATIENT)
Dept: CARDIOLOGY CLINIC | Age: 88
End: 2023-09-19

## 2023-09-19 NOTE — TELEPHONE ENCOUNTER
Spoke with the patient's wife about scheduling a 2 year post Watchman visit with Tosha Fontana CNP 1/10/24 @ 130 pm. NO questions at this time

## 2024-01-09 ENCOUNTER — TELEPHONE (OUTPATIENT)
Dept: CARDIOLOGY CLINIC | Age: 89
End: 2024-01-09

## 2024-01-10 ENCOUNTER — TELEMEDICINE (OUTPATIENT)
Dept: CARDIOLOGY CLINIC | Age: 89
End: 2024-01-10
Payer: MEDICARE

## 2024-01-10 DIAGNOSIS — I48.0 PAROXYSMAL ATRIAL FIBRILLATION (HCC): ICD-10-CM

## 2024-01-10 DIAGNOSIS — Z95.818 PRESENCE OF WATCHMAN LEFT ATRIAL APPENDAGE CLOSURE DEVICE: Primary | ICD-10-CM

## 2024-01-10 DIAGNOSIS — I25.118 CORONARY ARTERY DISEASE OF NATIVE ARTERY OF NATIVE HEART WITH STABLE ANGINA PECTORIS (HCC): ICD-10-CM

## 2024-01-10 DIAGNOSIS — I71.21 ANEURYSM OF ASCENDING AORTA WITHOUT RUPTURE (HCC): ICD-10-CM

## 2024-01-10 PROCEDURE — 99214 OFFICE O/P EST MOD 30 MIN: CPT | Performed by: NURSE PRACTITIONER

## 2024-01-10 NOTE — PROGRESS NOTES
[x] Mouth/Throat: Mucous membranes are moist    External Ears [x] Normal  [] Abnormal -    Neck: [x] No visualized mass [] Abnormal -     Pulmonary/Chest: [x] Respiratory effort normal   [x] No visualized signs of difficulty breathing or respiratory distress        [] Abnormal -      Musculoskeletal:   [x] Normal gait with no signs of ataxia         [x] Normal range of motion of neck        [] Abnormal -     Neurological:        [x] No Facial Asymmetry (Cranial nerve 7 motor function) (limited exam due to video visit)          [x] No gaze palsy        [] Abnormal -          Skin:        [x] No significant exanthematous lesions or discoloration noted on facial skin         [] Abnormal -            Psychiatric:       [x] Normal Affect [] Abnormal -        [] No Hallucinations    Other pertinent observable physical exam findings:- none      ASSESSMENT and PLAN:  Paroxysmal Atrial Fibrillation, [id number] = 4  2 year post WATCHMAN follow-up     Continues to do well; feeling well.  No stroke symptoms.  1 year post watchman ANNALEE noted device in good position with very minimal posterior leak 0.1.  No thrombi.  EF 60%.  Very small PFO.  Patient asymptomatic.  No anginal symptoms, no dyspnea or symptoms of decompensated heart failure.  No palpitations, no lightheadedness or dizziness or near syncope.  No stroke symptoms.  Tolerating medications.  Active.  Continue current medications as prescribed.  Virtual visit today requested due to wife's health and high risk of serious complications if exposed to respiratory illness. Thus, virtual visit to limit Mr. Montaño's risk of exposure to community illness.     Patient instructions:  Continue current medications.   Stay as active as you can.    Eat heart healthy diet.    Stay well hydrated.   Follow-up with your PCP as scheduled.   Follow-up with Dr. Roman as scheduled or sooner if need.    Follow-up with Dr. Miller on as needed basis.        --Tosha Fontana, APRN - CNP

## 2024-01-22 ASSESSMENT — ENCOUNTER SYMPTOMS
BLOOD IN STOOL: 0
SINUS PAIN: 0
ABDOMINAL PAIN: 0
SHORTNESS OF BREATH: 0
SINUS PRESSURE: 0
COUGH: 0

## 2024-02-23 RX ORDER — VALSARTAN 160 MG/1
160 TABLET ORAL DAILY
Qty: 90 TABLET | Refills: 0 | Status: SHIPPED | OUTPATIENT
Start: 2024-02-23

## 2024-02-23 RX ORDER — FLECAINIDE ACETATE 100 MG/1
100 TABLET ORAL 2 TIMES DAILY
Qty: 180 TABLET | Refills: 0 | Status: SHIPPED | OUTPATIENT
Start: 2024-02-23

## 2024-02-23 NOTE — TELEPHONE ENCOUNTER
Jose Montaño called requesting a refill on the following medications:  Requested Prescriptions     Pending Prescriptions Disp Refills    flecainide (TAMBOCOR) 100 MG tablet 180 tablet 3     Sig: Take 1 tablet by mouth 2 times daily TAKE ONE TABLET BY MOUTH TWICE A DAY    valsartan (DIOVAN) 160 MG tablet 90 tablet 3     Sig: Take 1 tablet by mouth daily     [facility]  .      Date of last visit:1/10/24   Date of next visit (if applicable): 3/13/2024

## 2024-03-13 ENCOUNTER — OFFICE VISIT (OUTPATIENT)
Dept: CARDIOLOGY CLINIC | Age: 89
End: 2024-03-13
Payer: MEDICARE

## 2024-03-13 VITALS
WEIGHT: 174 LBS | SYSTOLIC BLOOD PRESSURE: 144 MMHG | BODY MASS INDEX: 24.91 KG/M2 | DIASTOLIC BLOOD PRESSURE: 62 MMHG | HEIGHT: 70 IN | HEART RATE: 52 BPM

## 2024-03-13 DIAGNOSIS — I25.118 CORONARY ARTERY DISEASE OF NATIVE ARTERY OF NATIVE HEART WITH STABLE ANGINA PECTORIS (HCC): ICD-10-CM

## 2024-03-13 DIAGNOSIS — I48.0 PAROXYSMAL ATRIAL FIBRILLATION (HCC): Primary | ICD-10-CM

## 2024-03-13 DIAGNOSIS — I10 PRIMARY HYPERTENSION: ICD-10-CM

## 2024-03-13 PROCEDURE — 1036F TOBACCO NON-USER: CPT | Performed by: NUCLEAR MEDICINE

## 2024-03-13 PROCEDURE — G8427 DOCREV CUR MEDS BY ELIG CLIN: HCPCS | Performed by: NUCLEAR MEDICINE

## 2024-03-13 PROCEDURE — G8484 FLU IMMUNIZE NO ADMIN: HCPCS | Performed by: NUCLEAR MEDICINE

## 2024-03-13 PROCEDURE — 1123F ACP DISCUSS/DSCN MKR DOCD: CPT | Performed by: NUCLEAR MEDICINE

## 2024-03-13 PROCEDURE — G8420 CALC BMI NORM PARAMETERS: HCPCS | Performed by: NUCLEAR MEDICINE

## 2024-03-13 PROCEDURE — 93000 ELECTROCARDIOGRAM COMPLETE: CPT | Performed by: NUCLEAR MEDICINE

## 2024-03-13 PROCEDURE — 99213 OFFICE O/P EST LOW 20 MIN: CPT | Performed by: NUCLEAR MEDICINE

## 2024-03-13 RX ORDER — FLECAINIDE ACETATE 100 MG/1
100 TABLET ORAL 2 TIMES DAILY
Qty: 180 TABLET | Refills: 3 | Status: SHIPPED | OUTPATIENT
Start: 2024-03-13

## 2024-03-13 RX ORDER — ATORVASTATIN CALCIUM 10 MG/1
TABLET, FILM COATED ORAL
Qty: 90 TABLET | Refills: 3 | Status: SHIPPED | OUTPATIENT
Start: 2024-03-13

## 2024-03-13 RX ORDER — VALSARTAN 160 MG/1
160 TABLET ORAL DAILY
Qty: 90 TABLET | Refills: 3 | Status: SHIPPED | OUTPATIENT
Start: 2024-03-13

## 2024-03-13 NOTE — PROGRESS NOTES
[facility] [place] [facility]  [facility] CARDIOLOGY  [address]  [address]  [facility] [address]  Dept: [phone]  Dept Fax: [phone]  Loc: [phone]    Visit Date: 3/13/2024    Jose Montaño is a 89 y.o. male who presents todayfor:  Chief Complaint   Patient presents with    Check-Up    Hypertension    Atrial Fibrillation     Know A fib and watchman   Previous TIA   Know CAD  No obstructive disease  Known AI and calcified valve  No changes clinically   No chest pain   No changes in breathing      HPI:  HPI  Past Medical History:   Diagnosis Date    A-fib (HCC)     Bronchitis     CAD (coronary artery disease)     atrial fib     Hyperlipidemia     Hypertension     Stroke (cerebrum) (HCC)     Thyroid disease     Trigeminal neuralgia       Past Surgical History:   Procedure Laterality Date    APPENDECTOMY  1951    BRAIN SURGERY  2003    CARDIAC SURGERY  12/01/2021    Watchman    CHOLECYSTECTOMY      COLONOSCOPY  2013    CRANIOTOMY  2003    GALLBLADDER SURGERY  2010    KIDNEY STONE SURGERY  2003    IL LAPAROSCOPY SURG RPR INITIAL INGUINAL HERNIA Bilateral 8/16/2018    robotic bilateral inguinal hernia repair with mesh performed by Henry Ospina MD at [facility] OR    TRANSESOPHAGEAL ECHOCARDIOGRAM N/A 1/19/2022    TRANSESOPHAGEAL ECHOCARDIOGRAM performed by Brad Styles MD at [facility] Endoscopy    TRANSESOPHAGEAL ECHOCARDIOGRAM N/A 11/16/2022    TRANSESOPHAGEAL ECHOCARDIOGRAM performed by Brad Styles MD at [facility] Endoscopy    TUR  2003     Family History   Problem Relation Age of Onset    Heart Disease Mother     Heart Attack Mother     Diabetes Father     Heart Disease Father     High Blood Pressure Father     Cancer Brother     Diabetes Brother     Heart Disease Brother     Diabetes Brother     Stroke Brother      Social History     Tobacco Use    Smoking status: Never    Smokeless tobacco: Never   Substance Use Topics    Alcohol use: No      Current Outpatient

## 2024-04-12 RX ORDER — VALSARTAN 160 MG/1
160 TABLET ORAL DAILY
Qty: 90 TABLET | Refills: 3 | Status: SHIPPED | OUTPATIENT
Start: 2024-04-12

## 2024-04-12 RX ORDER — ATORVASTATIN CALCIUM 10 MG/1
TABLET, FILM COATED ORAL
Qty: 90 TABLET | Refills: 3 | Status: SHIPPED | OUTPATIENT
Start: 2024-04-12

## 2024-04-25 ENCOUNTER — TELEPHONE (OUTPATIENT)
Dept: CARDIOLOGY CLINIC | Age: 89
End: 2024-04-25

## 2024-04-25 NOTE — TELEPHONE ENCOUNTER
Patient states he was having trouble with  Valsartan.  Called pharmacy. When script was sent in. It was too soon to fill. Pharmacy on script on hold.  Pharmacy will have medication ready in the next 1-1.5 hours.  Patient's wife notified and voiced understanding.

## 2024-06-03 RX ORDER — FLECAINIDE ACETATE 100 MG/1
100 TABLET ORAL 2 TIMES DAILY
Qty: 180 TABLET | Refills: 3 | Status: SHIPPED | OUTPATIENT
Start: 2024-06-03

## 2024-06-03 NOTE — TELEPHONE ENCOUNTER
Jose Montaño called requesting a refill on the following medications:  Requested Prescriptions     Pending Prescriptions Disp Refills    flecainide (TAMBOCOR) 100 MG tablet 180 tablet 3     Sig: Take 1 tablet by mouth 2 times daily TAKE ONE TABLET BY MOUTH TWICE A DAY     Pharmacy verified:[facility] [place], ph. [phone]  .pv      Date of last visit: 03.13.2024  Date of next visit (if applicable): 03.14.2025

## 2024-07-01 ENCOUNTER — OFFICE VISIT (OUTPATIENT)
Dept: FAMILY MEDICINE CLINIC | Age: 89
End: 2024-07-01

## 2024-07-01 VITALS
RESPIRATION RATE: 16 BRPM | WEIGHT: 168 LBS | HEART RATE: 55 BPM | DIASTOLIC BLOOD PRESSURE: 70 MMHG | OXYGEN SATURATION: 96 % | BODY MASS INDEX: 24.05 KG/M2 | SYSTOLIC BLOOD PRESSURE: 120 MMHG | HEIGHT: 70 IN

## 2024-07-01 DIAGNOSIS — E03.8 OTHER SPECIFIED HYPOTHYROIDISM: Primary | ICD-10-CM

## 2024-07-01 DIAGNOSIS — D75.89 MACROCYTOSIS: ICD-10-CM

## 2024-07-01 DIAGNOSIS — R53.83 OTHER FATIGUE: ICD-10-CM

## 2024-07-01 PROBLEM — D68.9 COAGULOPATHY (HCC): Status: RESOLVED | Noted: 2020-08-27 | Resolved: 2024-07-01

## 2024-07-01 LAB — TSH SERPL DL<=0.005 MIU/L-ACNC: 3.77 UIU/ML (ref 0.4–4.2)

## 2024-07-01 RX ORDER — CYANOCOBALAMIN 1000 UG/ML
1000 INJECTION, SOLUTION INTRAMUSCULAR; SUBCUTANEOUS ONCE
Status: COMPLETED | OUTPATIENT
Start: 2024-07-01 | End: 2024-07-01

## 2024-07-01 RX ADMIN — CYANOCOBALAMIN 1000 MCG: 1000 INJECTION, SOLUTION INTRAMUSCULAR; SUBCUTANEOUS at 10:40

## 2024-07-01 SDOH — ECONOMIC STABILITY: FOOD INSECURITY: WITHIN THE PAST 12 MONTHS, THE FOOD YOU BOUGHT JUST DIDN'T LAST AND YOU DIDN'T HAVE MONEY TO GET MORE.: NEVER TRUE

## 2024-07-01 SDOH — ECONOMIC STABILITY: HOUSING INSECURITY
IN THE LAST 12 MONTHS, WAS THERE A TIME WHEN YOU DID NOT HAVE A STEADY PLACE TO SLEEP OR SLEPT IN A SHELTER (INCLUDING NOW)?: NO

## 2024-07-01 SDOH — ECONOMIC STABILITY: FOOD INSECURITY: WITHIN THE PAST 12 MONTHS, YOU WORRIED THAT YOUR FOOD WOULD RUN OUT BEFORE YOU GOT MONEY TO BUY MORE.: NEVER TRUE

## 2024-07-01 SDOH — ECONOMIC STABILITY: INCOME INSECURITY: HOW HARD IS IT FOR YOU TO PAY FOR THE VERY BASICS LIKE FOOD, HOUSING, MEDICAL CARE, AND HEATING?: NOT VERY HARD

## 2024-07-01 ASSESSMENT — ENCOUNTER SYMPTOMS
COUGH: 0
ABDOMINAL PAIN: 0
CONSTIPATION: 0
DIARRHEA: 0
SORE THROAT: 0
SHORTNESS OF BREATH: 0
WHEEZING: 0
NAUSEA: 0
RHINORRHEA: 0

## 2024-07-01 ASSESSMENT — PATIENT HEALTH QUESTIONNAIRE - PHQ9
1. LITTLE INTEREST OR PLEASURE IN DOING THINGS: NOT AT ALL
SUM OF ALL RESPONSES TO PHQ QUESTIONS 1-9: 0
SUM OF ALL RESPONSES TO PHQ QUESTIONS 1-9: 0
2. FEELING DOWN, DEPRESSED OR HOPELESS: NOT AT ALL
SUM OF ALL RESPONSES TO PHQ QUESTIONS 1-9: 0
SUM OF ALL RESPONSES TO PHQ9 QUESTIONS 1 & 2: 0
SUM OF ALL RESPONSES TO PHQ QUESTIONS 1-9: 0

## 2024-07-01 NOTE — PROGRESS NOTES
After obtaining consent, and per orders of Francesca Porter MD  Immunization(s) and/or medication(s) given during visit by Milla Huffman LPN      Administrations This Visit       cyanocobalamin injection 1,000 mcg       Admin Date  07/01/2024  10:40 Action  Given Dose  1,000 mcg Route  IntraMUSCular Site  Ventrogluteal Left  Administered By  Milla Huffman LPN    Ordering Provider: Francesca Porter MD    Patient Supplied?: No                Double check done today with Khadra           
Venipuncture obtained from left arm. Patient tolerated the procedure without complication or complaint.  
  Musculoskeletal:  Negative for arthralgias and myalgias.   Neurological:  Negative for dizziness and headaches.   Psychiatric/Behavioral:  Negative for dysphoric mood and sleep disturbance. The patient is not nervous/anxious.        Physical Exam  Vitals and nursing note reviewed.   Constitutional:       Appearance: He is well-developed.   HENT:      Head: Normocephalic and atraumatic.   Eyes:      General: No scleral icterus.        Right eye: No discharge.         Left eye: No discharge.      Conjunctiva/sclera: Conjunctivae normal.   Cardiovascular:      Rate and Rhythm: Normal rate and regular rhythm.      Heart sounds: Normal heart sounds.   Pulmonary:      Effort: Pulmonary effort is normal.      Breath sounds: Normal breath sounds. No wheezing.   Skin:     General: Skin is warm and dry.   Neurological:      Mental Status: He is alert and oriented to person, place, and time. Mental status is at baseline.   Psychiatric:         Behavior: Behavior normal.         Thought Content: Thought content normal.         Judgment: Judgment normal.         Vitals:    07/01/24 1000   BP: 120/70   Pulse: 55   Resp: 16   SpO2: 96%              An electronic signature was used to authenticate this note.    --Francesca Porter MD

## 2024-07-22 DIAGNOSIS — E03.8 OTHER SPECIFIED HYPOTHYROIDISM: ICD-10-CM

## 2024-07-22 RX ORDER — LEVOTHYROXINE SODIUM 0.03 MG/1
12.5 TABLET ORAL DAILY
Qty: 45 TABLET | Refills: 3 | Status: SHIPPED | OUTPATIENT
Start: 2024-07-22

## 2024-10-02 ENCOUNTER — OFFICE VISIT (OUTPATIENT)
Dept: FAMILY MEDICINE CLINIC | Age: 89
End: 2024-10-02

## 2024-10-02 VITALS
OXYGEN SATURATION: 97 % | DIASTOLIC BLOOD PRESSURE: 74 MMHG | HEART RATE: 74 BPM | SYSTOLIC BLOOD PRESSURE: 130 MMHG | HEIGHT: 70 IN | RESPIRATION RATE: 16 BRPM | TEMPERATURE: 97.6 F | BODY MASS INDEX: 24.02 KG/M2 | WEIGHT: 167.8 LBS

## 2024-10-02 DIAGNOSIS — L60.0 INGROWN NAIL OF GREAT TOE OF RIGHT FOOT: ICD-10-CM

## 2024-10-02 DIAGNOSIS — Z23 NEED FOR VACCINATION: ICD-10-CM

## 2024-10-02 DIAGNOSIS — Z00.00 MEDICARE ANNUAL WELLNESS VISIT, SUBSEQUENT: Primary | ICD-10-CM

## 2024-10-02 RX ORDER — CEPHALEXIN 500 MG/1
500 CAPSULE ORAL 3 TIMES DAILY
Qty: 21 CAPSULE | Refills: 0 | Status: SHIPPED | OUTPATIENT
Start: 2024-10-02 | End: 2024-10-09

## 2024-10-02 ASSESSMENT — PATIENT HEALTH QUESTIONNAIRE - PHQ9
2. FEELING DOWN, DEPRESSED OR HOPELESS: NOT AT ALL
SUM OF ALL RESPONSES TO PHQ QUESTIONS 1-9: 0
1. LITTLE INTEREST OR PLEASURE IN DOING THINGS: NOT AT ALL
SUM OF ALL RESPONSES TO PHQ QUESTIONS 1-9: 0
1. LITTLE INTEREST OR PLEASURE IN DOING THINGS: NOT AT ALL
2. FEELING DOWN, DEPRESSED OR HOPELESS: NOT AT ALL
SUM OF ALL RESPONSES TO PHQ QUESTIONS 1-9: 0
SUM OF ALL RESPONSES TO PHQ9 QUESTIONS 1 & 2: 0
SUM OF ALL RESPONSES TO PHQ9 QUESTIONS 1 & 2: 0

## 2024-10-02 ASSESSMENT — LIFESTYLE VARIABLES
HOW MANY STANDARD DRINKS CONTAINING ALCOHOL DO YOU HAVE ON A TYPICAL DAY: PATIENT DOES NOT DRINK
HOW OFTEN DO YOU HAVE A DRINK CONTAINING ALCOHOL: NEVER

## 2024-10-02 NOTE — PROGRESS NOTES
After obtaining consent, and per orders of Dr. Porter, injection of influenza given in Left deltoid by Eun Berman LPN. Patient instructed to remain in clinic for 20 minutes afterwards, and to report any adverse reaction to me immediately. Patient tolerated well.   
care):   Patient Care Team:  Francesca Porter MD as PCP - General (Family Medicine)  Francesca Porter MD as PCP - Empaneled Provider  Brad Styles MD as Cardiologist (Cardiology)      Reviewed and updated this visit:  Allergies  Meds  Med Hx  Surg Hx  Soc Hx  Fam Hx          Electronically signed by Francesca Porter MD on 10/2/2024 at 11:36 AM

## 2024-10-02 NOTE — PATIENT INSTRUCTIONS
for glaucoma; cataracts, macular degeneration, and other eye disorders.  A preventive dental visit is recommended every 6 months.  Try to get at least 150 minutes of exercise per week or 10,000 steps per day on a pedometer .  Order or download the FREE \"Exercise & Physical Activity: Your Everyday Guide\" from [employer]. Call 1-851.890.6197 or search [employer] online.  You need 8325-7197 mg of calcium and 3450-9413 IU of vitamin D per day. It is possible to meet your calcium requirement with diet alone, but a vitamin D supplement is usually necessary to meet this goal.  When exposed to the sun, use a sunscreen that protects against both UVA and UVB radiation with an SPF of 30 or greater. Reapply every 2 to 3 hours or after sweating, drying off with a towel, or swimming.  Always wear a seat belt when traveling in a car. Always wear a helmet when riding a bicycle or motorcycle.

## 2024-11-26 ENCOUNTER — PATIENT MESSAGE (OUTPATIENT)
Dept: FAMILY MEDICINE CLINIC | Age: 88
End: 2024-11-26

## 2024-11-26 ENCOUNTER — HOSPITAL ENCOUNTER (OUTPATIENT)
Age: 88
Discharge: HOME | End: 2024-11-26
Payer: MEDICARE

## 2024-11-26 ENCOUNTER — HOSPITAL ENCOUNTER (OUTPATIENT)
Dept: GENERAL RADIOLOGY | Age: 88
Discharge: HOME | End: 2024-11-26
Payer: MEDICARE

## 2024-11-26 DIAGNOSIS — M25.552 LEFT HIP PAIN: Primary | ICD-10-CM

## 2024-11-26 DIAGNOSIS — M25.552 LEFT HIP PAIN: ICD-10-CM

## 2024-11-26 PROCEDURE — 73502 X-RAY EXAM HIP UNI 2-3 VIEWS: CPT

## 2024-12-28 ENCOUNTER — TELEPHONE (OUTPATIENT)
Dept: FAMILY MEDICINE CLINIC | Age: 88
End: 2024-12-28

## 2024-12-28 ENCOUNTER — HOSPITAL ENCOUNTER (OUTPATIENT)
Age: 88
Discharge: HOME | End: 2024-12-28
Payer: MEDICARE

## 2024-12-28 DIAGNOSIS — U07.1 COVID-19: ICD-10-CM

## 2024-12-28 DIAGNOSIS — R68.89 FLU-LIKE SYMPTOMS: ICD-10-CM

## 2024-12-28 DIAGNOSIS — J40 BRONCHITIS: Primary | ICD-10-CM

## 2024-12-28 LAB
INFLUENZA A BY PCR: NOT DETECTED
INFLUENZA B BY PCR: NOT DETECTED
SARS-COV-2 RNA, RT PCR: DETECTED

## 2024-12-28 PROCEDURE — 87636 SARSCOV2 & INF A&B AMP PRB: CPT

## 2024-12-28 RX ORDER — CEFDINIR 300 MG/1
300 CAPSULE ORAL 2 TIMES DAILY
Qty: 20 CAPSULE | Refills: 0 | Status: SHIPPED | OUTPATIENT
Start: 2024-12-28 | End: 2025-01-07

## 2024-12-28 NOTE — TELEPHONE ENCOUNTER
Daughter called  Pt with fever and cough  They asked for ATB to sent in, which I did.  However, I also recommended covid/flu swab be done.    That was completed and was + for Covid    Paxlovid contraindicated re: Flecainide    Will start molunpiravir instead.    Pt doing ok  Cough  No fever  No SOB.   O2 WNL    They will call any changes  ER precautions reviewed.        Diagnosis Orders   1. Bronchitis  cefdinir (OMNICEF) 300 MG capsule      2. Flu-like symptoms  COVID-19 & Influenza Combo      3. COVID-19  molnupiravir 200 MG capsule        Future Appointments   Date Time Provider Department Center   3/14/2025 12:15 PM Brad Styles MD N [facility]

## 2024-12-28 NOTE — TELEPHONE ENCOUNTER
Diagnosis Orders   1. Bronchitis  cefdinir (OMNICEF) 300 MG capsule      2. Flu-like symptoms  COVID-19 & Influenza Combo

## 2025-03-14 ENCOUNTER — OFFICE VISIT (OUTPATIENT)
Dept: CARDIOLOGY CLINIC | Age: 89
End: 2025-03-14

## 2025-03-14 VITALS
SYSTOLIC BLOOD PRESSURE: 144 MMHG | DIASTOLIC BLOOD PRESSURE: 80 MMHG | HEART RATE: 58 BPM | WEIGHT: 164 LBS | HEIGHT: 70 IN | BODY MASS INDEX: 23.48 KG/M2

## 2025-03-14 DIAGNOSIS — I48.0 PAROXYSMAL ATRIAL FIBRILLATION (HCC): Primary | ICD-10-CM

## 2025-03-14 DIAGNOSIS — I10 PRIMARY HYPERTENSION: ICD-10-CM

## 2025-03-14 DIAGNOSIS — I71.21 ANEURYSM OF ASCENDING AORTA WITHOUT RUPTURE: ICD-10-CM

## 2025-03-14 NOTE — PROGRESS NOTES
[facility] [place] [facility]  [facility] CARDIOLOGY  [address]  [address]  [place] [facility] [address]  Dept: [phone]  Dept Fax: [phone]  Loc: [phone]    Visit Date: 3/14/2025    Jose Montaño is a 89 y.o. male who presents todayfor:  Chief Complaint   Patient presents with    Follow-up     Follow up.    Hypertension    Atrial Fibrillation    Coronary Artery Disease    Valvular Heart Disease   Known diffuse CAD  And A fib and watchman   A fib is stable  No dizziness  No syncope  No chest pain   No changes in breathing  BP is stable  On statins for hyperlipidemia  No issues there  Following his TAA and AI   Seems stable for now       HPI:  HPI  Past Medical History:   Diagnosis Date    A-fib (HCC)     Bronchitis     CAD (coronary artery disease)     atrial fib     Hyperlipidemia     Hypertension     Stroke (cerebrum) (HCC)     Thyroid disease     Trigeminal neuralgia       Past Surgical History:   Procedure Laterality Date    APPENDECTOMY  1951    BRAIN SURGERY  2003    CARDIAC SURGERY  12/01/2021    Watchman    CHOLECYSTECTOMY      COLONOSCOPY  2013    CRANIOTOMY  2003    GALLBLADDER SURGERY  2010    KIDNEY STONE SURGERY  2003    [place] LAPAROSCOPY SURG RPR INITIAL INGUINAL HERNIA Bilateral 8/16/2018    robotic bilateral inguinal hernia repair with mesh performed by Henry Ospina MD at [facility] OR    TRANSESOPHAGEAL ECHOCARDIOGRAM N/A 1/19/2022    TRANSESOPHAGEAL ECHOCARDIOGRAM performed by Brad Styles MD at [facility] Endoscopy    TRANSESOPHAGEAL ECHOCARDIOGRAM N/A 11/16/2022    TRANSESOPHAGEAL ECHOCARDIOGRAM performed by Brad Styles MD at [facility] Endoscopy    TURP  2003     Family History   Problem Relation Age of Onset    Heart Disease Mother     Heart Attack Mother     Diabetes Father     Heart Disease Father     High Blood Pressure Father     Cancer Brother     Diabetes Brother     Heart Disease Brother     Diabetes Brother     Stroke Brother      Social History

## 2025-03-14 NOTE — PROGRESS NOTES
Follow up.     EKG done today.    Denies chest pain, palpitations, dizziness, shortness of breath, and edema.     No cardiac concerns at this time.

## 2025-03-17 ENCOUNTER — HOSPITAL ENCOUNTER (OUTPATIENT)
Age: 89
Discharge: HOME | End: 2025-03-19
Attending: NUCLEAR MEDICINE
Payer: MEDICARE

## 2025-03-17 DIAGNOSIS — I71.21 ANEURYSM OF ASCENDING AORTA WITHOUT RUPTURE: ICD-10-CM

## 2025-03-17 DIAGNOSIS — I10 PRIMARY HYPERTENSION: ICD-10-CM

## 2025-03-17 DIAGNOSIS — I48.0 PAROXYSMAL ATRIAL FIBRILLATION (HCC): ICD-10-CM

## 2025-03-17 LAB
ECHO AO ASC DIAM: 4.3 CM
ECHO AO SINUS VALSALVA DIAM: 3.9 CM
ECHO AO ST JNCT DIAM: 3.3 CM
ECHO AR MAX VEL PISA: 4.5 M/S
ECHO AV CUSP MM: 2.1 CM
ECHO AV MEAN GRADIENT: 4 MMHG
ECHO AV MEAN VELOCITY: 0.9 M/S
ECHO AV PEAK GRADIENT: 7 MMHG
ECHO AV PEAK VELOCITY: 1.4 M/S
ECHO AV REGURGITANT PHT: 698 MS
ECHO AV VELOCITY RATIO: 1
ECHO AV VTI: 35.3 CM
ECHO EST RA PRESSURE: 3 MMHG
ECHO LA AREA 4C: 13.7 CM2
ECHO LA DIAMETER: 3.7 CM
ECHO LA MAJOR AXIS: 4.6 CM
ECHO LA VOL MOD A4C: 32 ML (ref 18–58)
ECHO LV E' LATERAL VELOCITY: 8.5 CM/S
ECHO LV E' SEPTAL VELOCITY: 8.7 CM/S
ECHO LV EDV A2C: 78 ML
ECHO LV EDV A4C: 101 ML
ECHO LV EJECTION FRACTION 3D: 52 %
ECHO LV EJECTION FRACTION A2C: 55 %
ECHO LV EJECTION FRACTION A4C: 50 %
ECHO LV EJECTION FRACTION BIPLANE: 52 % (ref 55–100)
ECHO LV ESV A2C: 36 ML
ECHO LV ESV A4C: 50 ML
ECHO LV FRACTIONAL SHORTENING: 30 % (ref 28–44)
ECHO LV INTERNAL DIMENSION DIASTOLIC: 4.3 CM (ref 4.2–5.9)
ECHO LV INTERNAL DIMENSION SYSTOLIC: 3 CM
ECHO LV ISOVOLUMETRIC RELAXATION TIME (IVRT): 109 MS
ECHO LV IVSD: 1.3 CM (ref 0.6–1)
ECHO LV MASS 2D: 196.1 G (ref 88–224)
ECHO LV POSTERIOR WALL DIASTOLIC: 1.2 CM (ref 0.6–1)
ECHO LV RELATIVE WALL THICKNESS RATIO: 0.56
ECHO LVOT AV VTI INDEX: 0.88
ECHO LVOT MEAN GRADIENT: 3 MMHG
ECHO LVOT PEAK GRADIENT: 7 MMHG
ECHO LVOT PEAK VELOCITY: 1.4 M/S
ECHO LVOT VTI: 31.2 CM
ECHO MV A VELOCITY: 0.69 M/S
ECHO MV E DECELERATION TIME (DT): 181 MS
ECHO MV E VELOCITY: 0.58 M/S
ECHO MV E/A RATIO: 0.84
ECHO MV E/E' LATERAL: 6.82
ECHO MV E/E' RATIO (AVERAGED): 6.75
ECHO MV E/E' SEPTAL: 6.67
ECHO PV MAX VELOCITY: 0.8 M/S
ECHO PV PEAK GRADIENT: 2 MMHG
ECHO RIGHT VENTRICULAR SYSTOLIC PRESSURE (RVSP): 23 MMHG
ECHO RV FREE WALL PEAK S': 11.2 CM/S
ECHO RV INTERNAL DIMENSION: 3.4 CM
ECHO RV TAPSE: 2.2 CM (ref 1.7–?)
ECHO TV E WAVE: 0.7 M/S
ECHO TV REGURGITANT MAX VELOCITY: 2.22 M/S
ECHO TV REGURGITANT PEAK GRADIENT: 20 MMHG

## 2025-03-17 PROCEDURE — 93306 TTE W/DOPPLER COMPLETE: CPT

## 2025-03-17 PROCEDURE — 93306 TTE W/DOPPLER COMPLETE: CPT | Performed by: NUCLEAR MEDICINE

## 2025-03-31 RX ORDER — ATORVASTATIN CALCIUM 10 MG/1
TABLET, FILM COATED ORAL
Qty: 90 TABLET | Refills: 3 | Status: SHIPPED | OUTPATIENT
Start: 2025-03-31

## 2025-03-31 RX ORDER — VALSARTAN 160 MG/1
160 TABLET ORAL DAILY
Qty: 90 TABLET | Refills: 3 | Status: SHIPPED | OUTPATIENT
Start: 2025-03-31

## 2025-04-07 ENCOUNTER — PATIENT MESSAGE (OUTPATIENT)
Dept: FAMILY MEDICINE CLINIC | Age: 89
End: 2025-04-07

## 2025-04-07 DIAGNOSIS — H91.93 BILATERAL HEARING LOSS, UNSPECIFIED HEARING LOSS TYPE: Primary | ICD-10-CM

## 2025-04-10 ENCOUNTER — HOSPITAL ENCOUNTER (OUTPATIENT)
Dept: AUDIOLOGY | Age: 89
Discharge: HOME | End: 2025-04-10
Payer: MEDICARE

## 2025-04-10 PROCEDURE — 92593 HC HEARING AID CHECK, BOTH EARS: CPT | Performed by: AUDIOLOGIST

## 2025-04-10 PROCEDURE — 92567 TYMPANOMETRY: CPT | Performed by: AUDIOLOGIST

## 2025-04-10 PROCEDURE — 92557 COMPREHENSIVE HEARING TEST: CPT | Performed by: AUDIOLOGIST

## 2025-04-10 NOTE — PROGRESS NOTES
set with Dr. Porter.  3- Annual audiometry for monitoring purposes, or sooner if any changes are noted in hearing ability.     PREVIOUS AUDIOGRAM COMPLETED 10/1/2020:

## 2025-04-17 ENCOUNTER — OFFICE VISIT (OUTPATIENT)
Dept: FAMILY MEDICINE CLINIC | Age: 89
End: 2025-04-17
Payer: MEDICARE

## 2025-04-17 VITALS
SYSTOLIC BLOOD PRESSURE: 110 MMHG | DIASTOLIC BLOOD PRESSURE: 60 MMHG | HEART RATE: 63 BPM | BODY MASS INDEX: 23.62 KG/M2 | HEIGHT: 70 IN | OXYGEN SATURATION: 94 % | RESPIRATION RATE: 16 BRPM | WEIGHT: 165 LBS

## 2025-04-17 DIAGNOSIS — H61.23 BILATERAL IMPACTED CERUMEN: Primary | ICD-10-CM

## 2025-04-17 PROCEDURE — 69210 REMOVE IMPACTED EAR WAX UNI: CPT | Performed by: FAMILY MEDICINE

## 2025-04-17 SDOH — ECONOMIC STABILITY: FOOD INSECURITY: WITHIN THE PAST 12 MONTHS, YOU WORRIED THAT YOUR FOOD WOULD RUN OUT BEFORE YOU GOT MONEY TO BUY MORE.: NEVER TRUE

## 2025-04-17 SDOH — ECONOMIC STABILITY: INCOME INSECURITY: IN THE LAST 12 MONTHS, WAS THERE A TIME WHEN YOU WERE NOT ABLE TO PAY THE MORTGAGE OR RENT ON TIME?: NO

## 2025-04-17 SDOH — ECONOMIC STABILITY: FOOD INSECURITY: WITHIN THE PAST 12 MONTHS, THE FOOD YOU BOUGHT JUST DIDN'T LAST AND YOU DIDN'T HAVE MONEY TO GET MORE.: NEVER TRUE

## 2025-04-17 ASSESSMENT — PATIENT HEALTH QUESTIONNAIRE - PHQ9
SUM OF ALL RESPONSES TO PHQ QUESTIONS 1-9: 0
1. LITTLE INTEREST OR PLEASURE IN DOING THINGS: NOT AT ALL
2. FEELING DOWN, DEPRESSED OR HOPELESS: NOT AT ALL
SUM OF ALL RESPONSES TO PHQ QUESTIONS 1-9: 0

## 2025-04-17 NOTE — PROGRESS NOTES
Ceruminosis is noted.  Wax is removed by syringing and manual debridement. Instructions for home care to prevent wax buildup are given.

## 2025-04-17 NOTE — PROGRESS NOTES
Ear Cerumen Removal Procedure Note    Indication: ear cerumen impaction    Procedure: After placing the patient's head in the appropriate position, the patient's right ear canal was irrigated with the appropriate solution and curetted until the majority of the cerumen was flushed out of the canal.  The other ear canal also had cerumen present and was irrigated with the appropriate solution until all cerumen was removed and the ear canal was clear. At this point the procedure was complete.     The patient tolerated the procedure well.    Complications: None    Electronically signed by Francesca Porter MD on 4/17/2025 at 3:22 PM

## 2025-06-05 ENCOUNTER — HOSPITAL ENCOUNTER (OUTPATIENT)
Dept: AUDIOLOGY | Age: 89
Discharge: HOME | End: 2025-06-05

## 2025-06-05 PROCEDURE — 9990000010 HC NO CHARGE VISIT: Performed by: AUDIOLOGIST

## 2025-06-05 NOTE — PROGRESS NOTES
SPEECH MAPPING:    The [employer] real ear system was used to perform verification of Jose's hearing aid fitting. The output of Jose's binaural amplification was re-programmed to match appropriate NAL-NL2 targets for MPO, soft, medium and loud stimuli utilizing speech mapping based on his 4/10/25 audiogram. Increased to 100% target gain (which was based on old audiogram), plus increased an additional +3 on overall gain,, binaurally, to match target today.    Speech mapping results suggest: appropriate outcomes for the right ear with the hearing aid set to NAL-NL2 targets. Speech mapping results suggest appropriately fit amplification through 1500Hz for the left ear. Unable to meet speech mapping targets greater than 1500 Hz with an appropriate hearing aid. Patient prefers frequency lowering turned on. Patient had slim tips in the past, but they were removed due to loudness/occlusion of his own voice. Did not switch patient to closed dome due to fear that occlusion would reoccur. May try closed dome at future visit once patient acclimates to today's changes. Annual hearing aid check is scheduled for 6/5/26.

## 2025-06-26 ENCOUNTER — HOSPITAL ENCOUNTER (OUTPATIENT)
Dept: AUDIOLOGY | Age: 89
Discharge: HOME | End: 2025-06-26

## 2025-06-26 PROCEDURE — 9990000010 HC NO CHARGE VISIT: Performed by: AUDIOLOGIST

## 2025-06-26 NOTE — PROGRESS NOTES
HEARING AID PROBLEM: Patient states that his own voice is too loud since adjustments were made at previous visit. Tried increasing occlusion manager, which did not help. Decreased G80 x 1 and patient noted improvement in the loudness of his own voice. Encouraged him to return if further adjustments are needed.

## 2025-07-04 DIAGNOSIS — E03.8 OTHER SPECIFIED HYPOTHYROIDISM: ICD-10-CM

## 2025-07-06 RX ORDER — LEVOTHYROXINE SODIUM 25 UG/1
12.5 TABLET ORAL DAILY
Qty: 45 TABLET | Refills: 3 | Status: SHIPPED | OUTPATIENT
Start: 2025-07-06

## 2025-07-07 RX ORDER — FLECAINIDE ACETATE 100 MG/1
100 TABLET ORAL 2 TIMES DAILY
Qty: 180 TABLET | Refills: 3 | Status: SHIPPED | OUTPATIENT
Start: 2025-07-07

## 2025-07-16 ENCOUNTER — PATIENT MESSAGE (OUTPATIENT)
Dept: FAMILY MEDICINE CLINIC | Age: 89
End: 2025-07-16

## 2025-07-16 DIAGNOSIS — M25.572 ACUTE LEFT ANKLE PAIN: Primary | ICD-10-CM

## 2025-07-17 ENCOUNTER — HOSPITAL ENCOUNTER (OUTPATIENT)
Dept: GENERAL RADIOLOGY | Age: 89
Discharge: HOME | End: 2025-07-17
Payer: MEDICARE

## 2025-07-17 ENCOUNTER — HOSPITAL ENCOUNTER (OUTPATIENT)
Age: 89
Discharge: HOME | End: 2025-07-17
Payer: MEDICARE

## 2025-07-17 DIAGNOSIS — M25.572 ACUTE LEFT ANKLE PAIN: ICD-10-CM

## 2025-07-17 PROCEDURE — 73610 X-RAY EXAM OF ANKLE: CPT

## 2025-07-17 PROCEDURE — 73630 X-RAY EXAM OF FOOT: CPT

## (undated) DEVICE — STAPLER SHEATH: Brand: ENDOWRIST

## (undated) DEVICE — YANKAUER,BULB TIP,W/O VENT,RIGID,STERILE: Brand: MEDLINE

## (undated) DEVICE — BLADE CLIPPER GEN PURP NS

## (undated) DEVICE — TUBING, SUCTION, 1/4" X 20', STRAIGHT: Brand: MEDLINE INDUSTRIES, INC.

## (undated) DEVICE — Device

## (undated) DEVICE — SOLUTION IV 1000ML 0.9% SOD CHL PH 5 INJ USP VIAFLX PLAS

## (undated) DEVICE — BLADELESS OBTURATOR: Brand: WECK VISTA

## (undated) DEVICE — GLOVE ORANGE PI 7   MSG9070

## (undated) DEVICE — 3M™ WARMING BLANKET, UPPER BODY, 10 PER CASE, 42268: Brand: BAIR HUGGER™

## (undated) DEVICE — TIP COVER ACCESSORY

## (undated) DEVICE — GLOVE SURG SZ 6 THK91MIL LTX FREE SYN POLYISOPRENE ANTI

## (undated) DEVICE — SUTURE V-LOC 180 SZ 3-0 L9IN ABSRB GRN L26MM V-20 1/2 CIR VLOCL0644

## (undated) DEVICE — BINDER ABD SM M W12IN CIRC 30 45IN 4 PNL E CNTCT CLSR POSTOP

## (undated) DEVICE — ELECTRO LUBE IS A SINGLE PATIENT USE DEVICE THAT IS INTENDED TO BE USED ON ELECTROSURGICAL ELECTRODES TO REDUCE STICKING.: Brand: KEY SURGICAL ELECTRO LUBE

## (undated) DEVICE — TUBING FLTR PLUME AWAY EVAC W/ SUCT DEV DISP PUREVIEW

## (undated) DEVICE — CANNULA SEAL

## (undated) DEVICE — COLUMN DRAPE

## (undated) DEVICE — BASIC SINGLE BASIN BTC-LF: Brand: MEDLINE INDUSTRIES, INC.

## (undated) DEVICE — TROCAR ENDOSCP L100MM DIA11MM DIL TIP STBL SL DISP ENDOPATH

## (undated) DEVICE — LINER SUCT CANSTR 1500CC SEMI RIG W/ POR HYDROPHOBIC SHUT

## (undated) DEVICE — ARM DRAPE

## (undated) DEVICE — SEAL

## (undated) DEVICE — SOLUTION ANTIFOG VIS SYS CLEARIFY LAPSCP

## (undated) DEVICE — GENERAL LAPAROSCOPY PACK-LF: Brand: MEDLINE INDUSTRIES, INC.

## (undated) DEVICE — INSUFFLATION NEEDLE TO ESTABLISH PNEUMOPERITONEUM.: Brand: INSUFFLATION NEEDLE

## (undated) DEVICE — REDUCER: Brand: ENDOWRIST

## (undated) DEVICE — [HIGH FLOW INSUFFLATOR,  DO NOT USE IF PACKAGE IS DAMAGED,  KEEP DRY,  KEEP AWAY FROM SUNLIGHT,  PROTECT FROM HEAT AND RADIOACTIVE SOURCES.]: Brand: PNEUMOSURE

## (undated) DEVICE — COVER ARMBRD W13XL28.5IN IMPERV BLU FOR OP RM

## (undated) DEVICE — GOWN,SIRUS,NON REINFRCD,LARGE,SET IN SL: Brand: MEDLINE